# Patient Record
Sex: FEMALE | Race: WHITE | Employment: PART TIME | URBAN - METROPOLITAN AREA
[De-identification: names, ages, dates, MRNs, and addresses within clinical notes are randomized per-mention and may not be internally consistent; named-entity substitution may affect disease eponyms.]

---

## 2017-01-15 ENCOUNTER — HOSPITAL ENCOUNTER (EMERGENCY)
Age: 27
Discharge: HOME | End: 2017-01-15
Attending: EMERGENCY MEDICINE | Admitting: EMERGENCY MEDICINE
Payer: SELF-PAY

## 2017-01-15 VITALS
OXYGEN SATURATION: 99 % | SYSTOLIC BLOOD PRESSURE: 112 MMHG | BODY MASS INDEX: 21.19 KG/M2 | RESPIRATION RATE: 20 BRPM | HEART RATE: 81 BPM | TEMPERATURE: 97.6 F | DIASTOLIC BLOOD PRESSURE: 71 MMHG | HEIGHT: 67 IN | WEIGHT: 135 LBS

## 2017-01-15 DIAGNOSIS — K52.9 GASTROENTERITIS, ACUTE: Primary | ICD-10-CM

## 2017-01-15 LAB
ALBUMIN SERPL BCP-MCNC: 4 G/DL (ref 3.5–5)
ALBUMIN/GLOB SERPL: 1.3 {RATIO} (ref 1.1–2.2)
ALP SERPL-CCNC: 81 U/L (ref 45–117)
ALT SERPL-CCNC: 28 U/L (ref 12–78)
ANION GAP BLD CALC-SCNC: 12 MMOL/L (ref 5–15)
AST SERPL W P-5'-P-CCNC: 23 U/L (ref 15–37)
BASOPHILS # BLD AUTO: 0 K/UL (ref 0–0.1)
BASOPHILS # BLD: 0 % (ref 0–1)
BILIRUB SERPL-MCNC: 0.4 MG/DL (ref 0.2–1)
BUN SERPL-MCNC: 7 MG/DL (ref 6–20)
BUN/CREAT SERPL: 8 (ref 12–20)
CALCIUM SERPL-MCNC: 8.7 MG/DL (ref 8.5–10.1)
CHLORIDE SERPL-SCNC: 105 MMOL/L (ref 97–108)
CO2 SERPL-SCNC: 22 MMOL/L (ref 21–32)
CREAT SERPL-MCNC: 0.84 MG/DL (ref 0.55–1.02)
EOSINOPHIL # BLD: 0.1 K/UL (ref 0–0.4)
EOSINOPHIL NFR BLD: 1 % (ref 0–7)
ERYTHROCYTE [DISTWIDTH] IN BLOOD BY AUTOMATED COUNT: 13.6 % (ref 11.5–14.5)
GLOBULIN SER CALC-MCNC: 3 G/DL (ref 2–4)
GLUCOSE SERPL-MCNC: 142 MG/DL (ref 65–100)
HCT VFR BLD AUTO: 38.9 % (ref 35–47)
HGB BLD-MCNC: 13.4 G/DL (ref 11.5–16)
LYMPHOCYTES # BLD AUTO: 15 % (ref 12–49)
LYMPHOCYTES # BLD: 2.7 K/UL (ref 0.8–3.5)
MCH RBC QN AUTO: 29.7 PG (ref 26–34)
MCHC RBC AUTO-ENTMCNC: 34.4 G/DL (ref 30–36.5)
MCV RBC AUTO: 86.3 FL (ref 80–99)
MONOCYTES # BLD: 0.6 K/UL (ref 0–1)
MONOCYTES NFR BLD AUTO: 3 % (ref 5–13)
NEUTS SEG # BLD: 14.9 K/UL (ref 1.8–8)
NEUTS SEG NFR BLD AUTO: 81 % (ref 32–75)
PLATELET # BLD AUTO: 242 K/UL (ref 150–400)
POTASSIUM SERPL-SCNC: 3.9 MMOL/L (ref 3.5–5.1)
PROT SERPL-MCNC: 7 G/DL (ref 6.4–8.2)
RBC # BLD AUTO: 4.51 M/UL (ref 3.8–5.2)
SODIUM SERPL-SCNC: 139 MMOL/L (ref 136–145)
WBC # BLD AUTO: 18.4 K/UL (ref 3.6–11)

## 2017-01-15 PROCEDURE — 96374 THER/PROPH/DIAG INJ IV PUSH: CPT

## 2017-01-15 PROCEDURE — 96361 HYDRATE IV INFUSION ADD-ON: CPT

## 2017-01-15 PROCEDURE — 36415 COLL VENOUS BLD VENIPUNCTURE: CPT | Performed by: EMERGENCY MEDICINE

## 2017-01-15 PROCEDURE — 85025 COMPLETE CBC W/AUTO DIFF WBC: CPT | Performed by: EMERGENCY MEDICINE

## 2017-01-15 PROCEDURE — 74011250636 HC RX REV CODE- 250/636: Performed by: EMERGENCY MEDICINE

## 2017-01-15 PROCEDURE — 80053 COMPREHEN METABOLIC PANEL: CPT | Performed by: EMERGENCY MEDICINE

## 2017-01-15 PROCEDURE — 96372 THER/PROPH/DIAG INJ SC/IM: CPT

## 2017-01-15 PROCEDURE — 99284 EMERGENCY DEPT VISIT MOD MDM: CPT

## 2017-01-15 PROCEDURE — 96375 TX/PRO/DX INJ NEW DRUG ADDON: CPT

## 2017-01-15 RX ORDER — PROCHLORPERAZINE EDISYLATE 5 MG/ML
10 INJECTION INTRAMUSCULAR; INTRAVENOUS
Status: COMPLETED | OUTPATIENT
Start: 2017-01-15 | End: 2017-01-15

## 2017-01-15 RX ORDER — ONDANSETRON 2 MG/ML
4 INJECTION INTRAMUSCULAR; INTRAVENOUS
Status: COMPLETED | OUTPATIENT
Start: 2017-01-15 | End: 2017-01-15

## 2017-01-15 RX ORDER — DICYCLOMINE HYDROCHLORIDE 20 MG/1
20 TABLET ORAL EVERY 6 HOURS
Qty: 20 TAB | Refills: 0 | Status: SHIPPED | OUTPATIENT
Start: 2017-01-15 | End: 2017-01-15

## 2017-01-15 RX ORDER — PROMETHAZINE HYDROCHLORIDE 25 MG/1
25 SUPPOSITORY RECTAL
Qty: 12 SUPPOSITORY | Refills: 0 | Status: SHIPPED | OUTPATIENT
Start: 2017-01-15 | End: 2017-01-22

## 2017-01-15 RX ORDER — DICYCLOMINE HYDROCHLORIDE 20 MG/1
20 TABLET ORAL EVERY 6 HOURS
Qty: 20 TAB | Refills: 0 | Status: SHIPPED | OUTPATIENT
Start: 2017-01-15 | End: 2017-01-20

## 2017-01-15 RX ORDER — ONDANSETRON 4 MG/1
4 TABLET, ORALLY DISINTEGRATING ORAL
Qty: 10 TAB | Refills: 0 | Status: SHIPPED | OUTPATIENT
Start: 2017-01-15 | End: 2017-05-17

## 2017-01-15 RX ORDER — DICYCLOMINE HYDROCHLORIDE 10 MG/ML
20 INJECTION INTRAMUSCULAR
Status: COMPLETED | OUTPATIENT
Start: 2017-01-15 | End: 2017-01-15

## 2017-01-15 RX ORDER — ONDANSETRON 4 MG/1
4 TABLET, ORALLY DISINTEGRATING ORAL
Qty: 10 TAB | Refills: 0 | Status: SHIPPED | OUTPATIENT
Start: 2017-01-15 | End: 2017-01-15

## 2017-01-15 RX ADMIN — DICYCLOMINE HYDROCHLORIDE 20 MG: 20 INJECTION, SOLUTION INTRAMUSCULAR at 06:45

## 2017-01-15 RX ADMIN — ONDANSETRON 4 MG: 2 INJECTION INTRAMUSCULAR; INTRAVENOUS at 05:50

## 2017-01-15 RX ADMIN — PROCHLORPERAZINE EDISYLATE 10 MG: 5 INJECTION INTRAMUSCULAR; INTRAVENOUS at 07:55

## 2017-01-15 RX ADMIN — SODIUM CHLORIDE 1000 ML: 900 INJECTION, SOLUTION INTRAVENOUS at 05:52

## 2017-01-15 NOTE — DISCHARGE INSTRUCTIONS
Food Poisoning: Care Instructions  Your Care Instructions  Food poisoning occurs when you eat foods that contain harmful germs. Food can be contaminated while it is growing, during processing, or when it is prepared. Fresh fruits and vegetables also can be contaminated if they are washed in contaminated water. You may have become ill after eating undercooked meat or eggs or other unsafe foods. Cooking foods thoroughly and storing them properly can help prevent food poisoning. There are many types of food poisoning. Your symptoms depend on the type of food poisoning you have. You will probably begin to feel better in 1 or 2 days. In the meantime, get plenty of rest and make sure that you do not become dehydrated. Follow-up care is a key part of your treatment and safety. Be sure to make and go to all appointments, and call your doctor if you are having problems. Its also a good idea to know your test results and keep a list of the medicines you take. How can you care for yourself at home? · To prevent dehydration, drink plenty of fluids, enough so that your urine is light yellow or clear like water. Choose water and other caffeine-free clear liquids until you feel better. If you have kidney, heart, or liver disease and have to limit fluids, talk with your doctor before you increase the amount of fluids you drink. · Begin eating small amounts of mild, low-fat foods, depending on how you feel. Try foods like rice, dry crackers, bananas, and applesauce. ¨ Avoid spicy foods, alcohol, and coffee until 48 hours after all symptoms have disappeared. ¨ Avoid chewing gum that contains sorbitol. ¨ Avoid dairy products for 3 days after symptoms disappear. · Take your medicines as prescribed. Call your doctor if you think you are having a problem with your medicine. You will get more details on the specific medicines your doctor prescribes. To prevent food poisoning  · Keep hot foods hot and cold foods cold.   · Do not eat meats, dressings, salads, or other foods that have been kept at room temperature for more than 2 hours. · Use a thermometer to check your refrigerator. It should be between 34°F and 40°F.  · Defrost meats in the refrigerator or microwave, not on the kitchen counter. · Keep your hands and your kitchen clean. Wash your hands, cutting boards, and countertops with hot, soapy water. If you use the same cutting board for chopping vegetables and preparing raw meat, be sure to wash the cutting board with hot, soapy water between each use. · Cook meat until it is well done. · Do not eat raw eggs or uncooked dough or sauces made with raw eggs. · Do not take chances. If you think food looks or tastes spoiled, throw it out. When should you call for help? Call 911 anytime you think you may need emergency care. For example, call if:  · You have sudden, severe belly pain. · You passed out (lost consciousness). · You vomit blood or what looks like coffee grounds. · You pass maroon or very bloody stools. Call your doctor now or seek immediate medical care if:  · You have signs of needing more fluids. You have sunken eyes and a dry mouth, and you pass only a little dark urine. · Weakness in your legs makes it hard to stand or walk. · You have swelling in your hands, face, or feet. · You have trouble breathing. · You are dizzy or lightheaded, or you feel like you may faint. · Your stools are black and tarlike or have streaks of blood. · You have numbness and tingling in your hands or feet. · You have new nausea or vomiting. · You have new or increased belly pain. · Your joints ache. Watch closely for changes in your health, and be sure to contact your doctor if:  · Your vomiting is not getting better after 1 to 2 days. · Your diarrhea is not getting better after 3 days. Where can you learn more? Go to http://lakshmi-alonso.info/.   Enter C557 in the search box to learn more about \"Food Poisoning: Care Instructions. \"  Current as of: May 24, 2016  Content Version: 11.1  © 9451-5634 [employer]. Care instructions adapted under license by [employer] (which disclaims liability or warranty for this information). If you have questions about a medical condition or this instruction, always ask your healthcare professional. [address] any warranty or liability for your use of this information. Gastroenteritis: Care Instructions  Your Care Instructions  Gastroenteritis is an illness that may cause nausea, vomiting, and diarrhea. It is sometimes called \"stomach flu. \" It can be caused by bacteria or a virus. You will probably begin to feel better in 1 to 2 days. In the meantime, get plenty of rest and make sure you do not become dehydrated. Dehydration occurs when your body loses too much fluid. Follow-up care is a key part of your treatment and safety. Be sure to make and go to all appointments, and call your doctor if you are having problems. Its also a good idea to know your test results and keep a list of the medicines you take. How can you care for yourself at home? · If your doctor prescribed antibiotics, take them as directed. Do not stop taking them just because you feel better. You need to take the full course of antibiotics. · Drink plenty of fluids to prevent dehydration, enough so that your urine is light yellow or clear like water. Choose water and other caffeine-free clear liquids until you feel better. If you have kidney, heart, or liver disease and have to limit fluids, talk with your doctor before you increase your fluid intake. · Drink fluids slowly, in frequent, small amounts, because drinking too much too fast can cause vomiting. · Begin eating mild foods, such as dry toast, yogurt, applesauce, bananas, and rice. Avoid spicy, hot, or high-fat foods, and do not drink alcohol or caffeine for a day or two.  Do not drink milk or eat ice cream until you are feeling better. How to prevent gastroenteritis  · Keep hot foods hot and cold foods cold. · Do not eat meats, dressings, salads, or other foods that have been kept at room temperature for more than 2 hours. · Use a thermometer to check your refrigerator. It should be between 34°F and 40°F.  · Defrost meats in the refrigerator or microwave, not on the kitchen counter. · Keep your hands and your kitchen clean. Wash your hands, cutting boards, and countertops with hot soapy water frequently. · Cook meat until it is well done. · Do not eat raw eggs or uncooked sauces made with raw eggs. · Do not take chances. If food looks or tastes spoiled, throw it out. When should you call for help? Call 911 anytime you think you may need emergency care. For example, call if:  · You vomit blood or what looks like coffee grounds. · You passed out (lost consciousness). · You pass maroon or very bloody stools. Call your doctor now or seek immediate medical care if:  · You have severe belly pain. · You have signs of needing more fluids. You have sunken eyes, a dry mouth, and pass only a little dark urine. · You feel like you are going to faint. · You have increased belly pain that does not go away in 1 to 2 days. · You have new or increased nausea, or you are vomiting. · You have a new or higher fever. · Your stools are black and tarlike or have streaks of blood. Watch closely for changes in your health, and be sure to contact your doctor if:  · You are dizzy or lightheaded. · You urinate less than usual, or your urine is dark yellow or brown. · You do not feel better with each day that goes by. Where can you learn more? Go to http://lakshmi-alonso.info/. Enter N142 in the search box to learn more about \"Gastroenteritis: Care Instructions. \"  Current as of: May 24, 2016  Content Version: 11.1  © 3628-1505 [employer].  Care instructions adapted under license by [employer] (which disclaims liability or warranty for this information). If you have questions about a medical condition or this instruction, always ask your healthcare professional. [address] any warranty or liability for your use of this information.

## 2017-01-15 NOTE — ED NOTES
Assumed care of pt at this time from EMS. EMS states that pt had chickfila last night for dinner around 5 pm and at 3 am this morning starting vomiting. Pt states she is 7 weeks pregnant and had issues with vomiting with her other pregnancies but not this bad. Pt complains of 7 out of 10 abdominal pain. assessment done at this time. Call bell in reach.

## 2017-01-15 NOTE — ED NOTES
Pt no longer vomiting will try sips ice and ginger ale. Pt awaiting her ride to home. Someone just called her.

## 2017-01-15 NOTE — ED PROVIDER NOTES
HPI Comments: Nolan Fox is a 32 y.o. female with no significant PMHx who presents via EMS to the ED with cc of nausea and vomiting for the past 3 hours. Per patient, she ate [employer] last night and went to bed. Around 3am, she woke up with nausea and vomiting. She states that she has thrown up approximately 10-15 times. She also complains of epigastric abdominal pain. The pain is worse when she vomits and better when she is still. Pt states that she is approximately 7 weeks pregnant and never had these symptoms with her other pregnancies. She is a . Pt denies fevers, chills, chest pain, diarrhea, or any other symptoms. PCP: None  OB: Dr. Alexey Pennington History: 1/2ppd Tobacco, Occassionally EtOH, Marijuana occassionally     The history is provided by the patient. No  was used. Past Medical History:   Diagnosis Date    Abnormal Pap smear     Routine gynecological examination      [facility]       Past Surgical History:   Procedure Laterality Date    Hx tonsillectomy           Family History:   Problem Relation Age of Onset    Hypertension Father        Social History     Social History    Marital status:      Spouse name: N/A    Number of children: N/A    Years of education: N/A     Occupational History    Not on file. Social History Main Topics    Smoking status: Current Some Day Smoker     Packs/day: 1.00     Years: 6.00     Types: Cigarettes    Smokeless tobacco: Never Used    Alcohol use 6.0 oz/week     12 Cans of beer per week      Comment: varies    Drug use: Yes     Special: Marijuana    Sexual activity: Yes     Partners: Male     Birth control/ protection: Implant     Other Topics Concern    Not on file     Social History Narrative    ** Merged History Encounter **            ALLERGIES: Review of patient's allergies indicates no known allergies. Review of Systems   Constitutional: Negative for chills and fever.    HENT: Negative for congestion, rhinorrhea, sneezing and sore throat. Eyes: Negative for redness and visual disturbance. Respiratory: Negative for shortness of breath. Cardiovascular: Negative for leg swelling. Gastrointestinal: Positive for abdominal pain, nausea and vomiting. Genitourinary: Negative for difficulty urinating and frequency. Musculoskeletal: Negative for back pain, myalgias and neck stiffness. Skin: Negative for rash. Neurological: Negative for dizziness, syncope, weakness and headaches. Hematological: Negative for adenopathy. Vitals:    01/15/17 0543   BP: 135/87   Pulse: 81   Resp: 20   Temp: 97.6 °F (36.4 °C)   SpO2: 100%   Weight: 61.2 kg (135 lb)   Height: 5' 7\" (1.702 m)            Physical Exam   Constitutional: She is oriented to person, place, and time. She appears well-developed and well-nourished. HENT:   Head: Normocephalic and atraumatic. Mouth/Throat: Oropharynx is clear and moist.   Eyes: Conjunctivae and EOM are normal.   Neck: Normal range of motion and full passive range of motion without pain. Neck supple. Cardiovascular: Normal rate, regular rhythm, S1 normal, S2 normal, normal heart sounds, intact distal pulses and normal pulses. No murmur heard. Pulmonary/Chest: Effort normal and breath sounds normal. No respiratory distress. She has no wheezes. Abdominal: Soft. Normal appearance and bowel sounds are normal. She exhibits no distension. There is tenderness in the epigastric area. There is no rebound and no guarding. Musculoskeletal: Normal range of motion. Neurological: She is alert and oriented to person, place, and time. She has normal strength. Skin: Skin is warm and intact. No rash noted. She is diaphoretic (mildly). There is pallor. Psychiatric: She has a normal mood and affect. Her speech is normal and behavior is normal. Judgment and thought content normal.   Nursing note and vitals reviewed.        MDM  Number of Diagnoses or Management Options  Diagnosis management comments: DDx: Likely viral gastroenteritis vs food poisoning. Pt states that the pain only started after she had thrown up a few times. Pt states that she isn't going to keep this pregnancy and that she is going to set up an appointment with her OB to discuss . Will treat N/V and hydrate with IVF. Will continue to monitor and reassess. ED Course       Procedures    Progress Note  6:28 AM  I have re-evaluated pt and she states that her nausea is better, but she is still having epigastric pain. Will treat with IM Bentyl and reassess. Low concern for pancreatitis. Progress Note  6:49 AM  I have re-evaluated pt and reviewed her labs. Has a leukocytosis which is likely from her vomiting. Doubt this is bacterial in nature. Suspect this is food poisoning and will manage symptoms. This will likely run it's course in the next 24-48 hours. PROGRESS NOTE  7:15 AM   Re-evaluated patient. She states she feels better. Will discharge. Written by Abhay Bates ED Scribe, as dictated by Xi Heredia MD.    PROGRESS NOTE  7:27 AM   Patient is vomiting again. Will treat with IV Compazine. Patient will be discharged. Written by Abhay Bates ED Scribe, as dictated by Xi Heredia MD.    LABORATORY TESTS:  Recent Results (from the past 12 hour(s))   CBC WITH AUTOMATED DIFF    Collection Time: 01/15/17  5:51 AM   Result Value Ref Range    WBC 18.4 (H) 3.6 - 11.0 K/uL    RBC 4.51 3.80 - 5.20 M/uL    HGB 13.4 11.5 - 16.0 g/dL    HCT 38.9 35.0 - 47.0 %    MCV 86.3 80.0 - 99.0 FL    MCH 29.7 26.0 - 34.0 PG    MCHC 34.4 30.0 - 36.5 g/dL    RDW 13.6 11.5 - 14.5 %    PLATELET 552 199 - 051 K/uL    NEUTROPHILS 81 (H) 32 - 75 %    LYMPHOCYTES 15 12 - 49 %    MONOCYTES 3 (L) 5 - 13 %    EOSINOPHILS 1 0 - 7 %    BASOPHILS 0 0 - 1 %    ABS. NEUTROPHILS 14.9 (H) 1.8 - 8.0 K/UL    ABS. LYMPHOCYTES 2.7 0.8 - 3.5 K/UL    ABS. MONOCYTES 0.6 0.0 - 1.0 K/UL    ABS.  EOSINOPHILS 0.1 0.0 - 0.4 K/UL    ABS. BASOPHILS 0.0 0.0 - 0.1 K/UL   METABOLIC PANEL, COMPREHENSIVE    Collection Time: 01/15/17  5:51 AM   Result Value Ref Range    Sodium 139 136 - 145 mmol/L    Potassium 3.9 3.5 - 5.1 mmol/L    Chloride 105 97 - 108 mmol/L    CO2 22 21 - 32 mmol/L    Anion gap 12 5 - 15 mmol/L    Glucose 142 (H) 65 - 100 mg/dL    BUN 7 6 - 20 MG/DL    Creatinine 0.84 0.55 - 1.02 MG/DL    BUN/Creatinine ratio 8 (L) 12 - 20      GFR est AA >60 >60 ml/min/1.73m2    GFR est non-AA >60 >60 ml/min/1.73m2    Calcium 8.7 8.5 - 10.1 MG/DL    Bilirubin, total 0.4 0.2 - 1.0 MG/DL    ALT 28 12 - 78 U/L    AST 23 15 - 37 U/L    Alk. phosphatase 81 45 - 117 U/L    Protein, total 7.0 6.4 - 8.2 g/dL    Albumin 4.0 3.5 - 5.0 g/dL    Globulin 3.0 2.0 - 4.0 g/dL    A-G Ratio 1.3 1.1 - 2.2         IMAGING RESULTS:  No orders to display       MEDICATIONS GIVEN:  Medications   prochlorperazine (COMPAZINE) injection 10 mg (not administered)   ondansetron (ZOFRAN) injection 4 mg (4 mg IntraVENous Given 1/15/17 2637)   sodium chloride 0.9 % bolus infusion 1,000 mL (1,000 mL IntraVENous New Bag 1/15/17 7655)   dicyclomine (BENTYL) 10 mg/mL injection 20 mg (20 mg IntraMUSCular Given 1/15/17 0645)       IMPRESSION:  1. Gastroenteritis, acute        PLAN:  1. Current Discharge Medication List      START taking these medications    Details   ondansetron (ZOFRAN ODT) 4 mg disintegrating tablet Take 1 Tab by mouth every eight (8) hours as needed for Nausea. Qty: 10 Tab, Refills: 0      dicyclomine (BENTYL) 20 mg tablet Take 1 Tab by mouth every six (6) hours for 20 doses. Qty: 20 Tab, Refills: 0         CONTINUE these medications which have NOT CHANGED    Details   albuterol (PROVENTIL HFA, VENTOLIN HFA) 90 mcg/actuation inhaler Take 2 Puffs by inhalation every four (4) hours as needed for Wheezing. Qty: 1 Inhaler, Refills: 2    Associated Diagnoses: Bronchitis, acute, with bronchospasm           2.    Follow-up Information     Follow up With Details Comments [address], DO Call in 1 day  [address] [facility]  [address] [place] [address]  [phone]      [facility] EMERGENCY DEPT  As needed, If symptoms worsen [address]  [address] [facility]  [phone]        Return to ED if worse     Discharge Note:  7:28 AM  The patient has been re-evaluated and is ready for discharge. Reviewed available results with patient. Counseled patient on diagnosis and care plan. Patient has expressed understanding, and all questions have been answered. Patient agrees with plan and agrees to follow up as recommended, or to return to the ED if their symptoms worsen. Discharge instructions have been provided and explained to the patient, along with reasons to return to the ED. Written by Sudha Huff, ED Scribe, as dictated by Gerson Castillo MD.    This note is prepared by Sudha Huff, acting as Scribe for Gerson Castillo MD,.     Gerson Castillo MD,: The scribe's documentation has been prepared under my direction and personally reviewed by me in its entirety. I confirm that the note above accurately reflects all work, treatment, procedures, and medical decision making performed by me.

## 2017-01-15 NOTE — ED NOTES
Bedside shift change report given to Junior Hernandez RN (oncoming nurse) by Brendon Lepe RN (offgoing nurse). Report included the following information SBAR, Kardex, ED Summary, Procedure Summary, MAR and Recent Results.

## 2017-03-10 ENCOUNTER — HOSPITAL ENCOUNTER (EMERGENCY)
Age: 27
Discharge: HOME | End: 2017-03-10
Attending: EMERGENCY MEDICINE | Admitting: EMERGENCY MEDICINE
Payer: SELF-PAY

## 2017-03-10 VITALS
WEIGHT: 126.1 LBS | TEMPERATURE: 98.3 F | HEIGHT: 68 IN | OXYGEN SATURATION: 100 % | SYSTOLIC BLOOD PRESSURE: 139 MMHG | RESPIRATION RATE: 18 BRPM | HEART RATE: 60 BPM | DIASTOLIC BLOOD PRESSURE: 91 MMHG | BODY MASS INDEX: 19.11 KG/M2

## 2017-03-10 DIAGNOSIS — N93.9 MENSTRUAL BLEEDING PROBLEM: ICD-10-CM

## 2017-03-10 DIAGNOSIS — N93.9 ABNORMAL VAGINAL BLEEDING: Primary | ICD-10-CM

## 2017-03-10 LAB
APPEARANCE UR: CLEAR
BACTERIA URNS QL MICRO: NEGATIVE /HPF
BASOPHILS # BLD AUTO: 0.1 K/UL (ref 0–0.1)
BASOPHILS # BLD: 1 % (ref 0–1)
BILIRUB UR QL: NEGATIVE
CLUE CELLS VAG QL WET PREP: NORMAL
COLOR UR: ABNORMAL
EOSINOPHIL # BLD: 0.2 K/UL (ref 0–0.4)
EOSINOPHIL NFR BLD: 2 % (ref 0–7)
EPITH CASTS URNS QL MICRO: ABNORMAL /LPF
ERYTHROCYTE [DISTWIDTH] IN BLOOD BY AUTOMATED COUNT: 13.8 % (ref 11.5–14.5)
GLUCOSE UR STRIP.AUTO-MCNC: NEGATIVE MG/DL
HCG UR QL: NEGATIVE
HCT VFR BLD AUTO: 35.3 % (ref 35–47)
HGB BLD-MCNC: 12.3 G/DL (ref 11.5–16)
HGB UR QL STRIP: ABNORMAL
HYALINE CASTS URNS QL MICRO: ABNORMAL /LPF (ref 0–5)
KETONES UR QL STRIP.AUTO: NEGATIVE MG/DL
KOH PREP SPEC: NORMAL
LEUKOCYTE ESTERASE UR QL STRIP.AUTO: NEGATIVE
LYMPHOCYTES # BLD AUTO: 38 % (ref 12–49)
LYMPHOCYTES # BLD: 3.3 K/UL (ref 0.8–3.5)
MCH RBC QN AUTO: 30.1 PG (ref 26–34)
MCHC RBC AUTO-ENTMCNC: 34.8 G/DL (ref 30–36.5)
MCV RBC AUTO: 86.5 FL (ref 80–99)
MONOCYTES # BLD: 0.6 K/UL (ref 0–1)
MONOCYTES NFR BLD AUTO: 6 % (ref 5–13)
NEUTS SEG # BLD: 4.7 K/UL (ref 1.8–8)
NEUTS SEG NFR BLD AUTO: 53 % (ref 32–75)
NITRITE UR QL STRIP.AUTO: NEGATIVE
PH UR STRIP: 6.5 [PH] (ref 5–8)
PLATELET # BLD AUTO: 180 K/UL (ref 150–400)
PROT UR STRIP-MCNC: NEGATIVE MG/DL
RBC # BLD AUTO: 4.08 M/UL (ref 3.8–5.2)
RBC #/AREA URNS HPF: ABNORMAL /HPF (ref 0–5)
SERVICE CMNT-IMP: NORMAL
SP GR UR REFRACTOMETRY: 1.01 (ref 1–1.03)
T VAGINALIS VAG QL WET PREP: NORMAL
UA: UC IF INDICATED,UAUC: ABNORMAL
UROBILINOGEN UR QL STRIP.AUTO: 0.2 EU/DL (ref 0.2–1)
WBC # BLD AUTO: 8.8 K/UL (ref 3.6–11)
WBC URNS QL MICRO: ABNORMAL /HPF (ref 0–4)

## 2017-03-10 PROCEDURE — 99284 EMERGENCY DEPT VISIT MOD MDM: CPT

## 2017-03-10 PROCEDURE — 36415 COLL VENOUS BLD VENIPUNCTURE: CPT | Performed by: PHYSICIAN ASSISTANT

## 2017-03-10 PROCEDURE — 85025 COMPLETE CBC W/AUTO DIFF WBC: CPT | Performed by: PHYSICIAN ASSISTANT

## 2017-03-10 PROCEDURE — 87491 CHLMYD TRACH DNA AMP PROBE: CPT | Performed by: PHYSICIAN ASSISTANT

## 2017-03-10 PROCEDURE — 87210 SMEAR WET MOUNT SALINE/INK: CPT | Performed by: PHYSICIAN ASSISTANT

## 2017-03-10 PROCEDURE — 81001 URINALYSIS AUTO W/SCOPE: CPT | Performed by: PHYSICIAN ASSISTANT

## 2017-03-10 PROCEDURE — 81025 URINE PREGNANCY TEST: CPT | Performed by: PHYSICIAN ASSISTANT

## 2017-03-10 NOTE — ED NOTES
Discharge instructions given to and reviewed with patient by PA [facility]. Patient verbalized her understanding.

## 2017-03-10 NOTE — DISCHARGE INSTRUCTIONS
Abnormal Uterine Bleeding: Care Instructions  Your Care Instructions  Abnormal uterine bleeding (AUB) is irregular bleeding from the uterus that is longer or heavier than usual or does not occur at your regular time. Sometimes it is caused by changes in hormone levels. It can also be caused by growths in the uterus, such as fibroids or polyps. Sometimes a cause cannot be found. You may have heavy bleeding when you are not expecting your period. Your doctor may suggest a pregnancy test, if you think you are pregnant. Follow-up care is a key part of your treatment and safety. Be sure to make and go to all appointments, and call your doctor if you are having problems. It's also a good idea to know your test results and keep a list of the medicines you take. How can you care for yourself at home? · Be safe with medicines. Take pain medicines exactly as directed. ¨ If the doctor gave you a prescription medicine for pain, take it as prescribed. ¨ If you are not taking a prescription pain medicine, ask your doctor if you can take an over-the-counter medicine. · You may be low in iron because of blood loss. Eat a balanced diet that is high in iron and vitamin C. Foods rich in iron include red meat, shellfish, eggs, beans, and leafy green vegetables. Talk to your doctor about whether you need to take iron pills or a multivitamin. When should you call for help? Call 911 anytime you think you may need emergency care. For example, call if:  · You passed out (lost consciousness). Call your doctor now or seek immediate medical care if:  · You have sudden, severe pain in your belly or pelvis. · You have severe vaginal bleeding. You are soaking through your usual pads or tampons every hour for 2 or more hours. · You feel dizzy or lightheaded, or you feel like you may faint. Watch closely for changes in your health, and be sure to contact your doctor if:  · You have new belly or pelvic pain.   · You have a fever.  · Your bleeding gets worse or lasts longer than 1 week. · You think you may be pregnant. Where can you learn more? Go to http://lakshmi-alonso.info/. Enter M681 in the search box to learn more about \"Abnormal Uterine Bleeding: Care Instructions. \"  Current as of: February 25, 2016  Content Version: 11.1  © 2773-5703 [employer]. Care instructions adapted under license by [employer] (which disclaims liability or warranty for this information). If you have questions about a medical condition or this instruction, always ask your healthcare professional. [address] any warranty or liability for your use of this information. Painful Menstrual Cramps: Care Instructions  Your Care Instructions    Painful menstrual cramps are very common. Many women go to the doctor because of bad cramps when they get their period. You may have cramps in your back, thighs, and belly. You may also have diarrhea, constipation, or nausea. Some women also get dizzy. Pain medicine and home treatment can help you feel better. Follow-up care is a key part of your treatment and safety. Be sure to make and go to all appointments, and call your doctor if you are having problems. It's also a good idea to know your test results and keep a list of the medicines you take. How can you care for yourself at home? · Take anti-inflammatory medicines for pain. Ibuprofen (Advil, Motrin) and naproxen (Aleve) usually work better than aspirin. ¨ Be safe with medicines. Talk to your doctor or pharmacist before you take any of these medicines. They may not be safe if you take other medicines or have other health problems. ¨ Start taking the recommended dose of pain medicine as soon as you start to feel pain. Or you can start on the day before your period. Keep taking the medicine for as many days as you have cramps.   ¨ If anti-inflammatory medicines don't help, try acetaminophen (Tylenol). ¨ Do not take two or more pain medicines at the same time unless the doctor told you to. Many pain medicines have acetaminophen, which is Tylenol. Too much acetaminophen (Tylenol) can be harmful. ¨ Read and follow all instructions on the label. · Put a heating pad set on low or a hot water bottle on your belly. Or take a warm bath. Heat improves blood flow and may help with pain. · Lie down and put a pillow under your knees. Or lie on your side and bring your knees up to your chest. This will help with any back pressure. · Get at least 30 minutes of exercise on most days of the week. This improves blood flow and may decrease pain. Walking is a good choice. You also may want to do other activities, such as running, swimming, cycling, or playing tennis or team sports. When should you call for help? Call 911 anytime you think you may need emergency care. For example, call if:  · You passed out (lost consciousness). Call your doctor now or seek immediate medical care if:  · You have sudden, severe pain in your belly or pelvis. · You have severe vaginal bleeding. This means that you are soaking through your usual pads or tampons every hour for 2 or more hours and passing clots of blood. · You are dizzy or lightheaded, or you feel like you may faint. Watch closely for changes in your health, and be sure to contact your doctor if:  · You think you may be pregnant. · You have new belly or pelvic pain. · You are taking pain medicine, but it is not helping. · You feel weak and tired. Where can you learn more? Go to http://lakshmi-alonso.info/. Enter [id number] in the search box to learn more about \"Painful Menstrual Cramps: Care Instructions. \"  Current as of: February 25, 2016  Content Version: 11.1  © 8551-3354 [employer]. Care instructions adapted under license by [employer] (which disclaims liability or warranty for this information).  If you have questions about a medical condition or this instruction, always ask your healthcare professional. [place] [address] any warranty or liability for your use of this information.

## 2017-03-10 NOTE — ED PROVIDER NOTES
HPI Comments: Kay Gregory is a 32 y.o. female  A1 with no significant medical history who presents ambulatory to [facility] ED with a chief complaint of vaginal bleeding with visible clots for the past three days, with patient reporting needing to change her pad every hour. Patient reports she had  completed on 17, and states she has not had a menstrual cycle since that time. Patient reports use of tobacco products (1 PPD), occasional use of alcohol, and daily use of marijuana, but denies use of any other illicit drugs. Patient denies use of birth control. Patient denies any headaches, dizziness, lightheadedness, urinary symptoms, vaginal or pelvic pain, or any other symptoms at this time. PCP: Joseph Tse MD    There are no other complaints, changes, or physical findings at this time. The history is provided by the patient. Past Medical History:   Diagnosis Date    Abnormal Pap smear     Routine gynecological examination     [facility]       Past Surgical History:   Procedure Laterality Date    HX TONSILLECTOMY           Family History:   Problem Relation Age of Onset    Hypertension Father        Social History     Social History    Marital status:      Spouse name: N/A    Number of children: N/A    Years of education: N/A     Occupational History    Not on file. Social History Main Topics    Smoking status: Current Some Day Smoker     Packs/day: 1.00     Years: 6.00     Types: Cigarettes    Smokeless tobacco: Never Used    Alcohol use 6.0 oz/week     12 Cans of beer per week      Comment: varies    Drug use: Yes     Special: Marijuana    Sexual activity: Yes     Partners: Male     Birth control/ protection: Implant     Other Topics Concern    Not on file     Social History Narrative    ** Merged History Encounter **          ALLERGIES: Review of patient's allergies indicates no known allergies. Review of Systems   Constitutional: Negative. HENT: Negative. Eyes: Negative. Respiratory: Negative. Cardiovascular: Negative. Gastrointestinal: Negative. Endocrine: Negative. Genitourinary: Positive for vaginal bleeding. Negative for difficulty urinating, dysuria, frequency, pelvic pain and vaginal pain. Musculoskeletal: Negative. Skin: Negative. Allergic/Immunologic: Negative. Neurological: Negative. Negative for dizziness, light-headedness and headaches. Hematological: Negative. Psychiatric/Behavioral: Negative. All other systems reviewed and are negative. Patient Vitals for the past 12 hrs:   Temp Pulse Resp BP SpO2   03/10/17 1609 98.3 °F (36.8 °C) 60 18 (!) 139/91 100 %      Physical Exam   Constitutional: She is oriented to person, place, and time. She appears well-developed and well-nourished. No distress. 33 yo  female   HENT:   Head: Normocephalic and atraumatic. Eyes: Conjunctivae are normal. Right eye exhibits no discharge. Left eye exhibits no discharge. Neck: Normal range of motion. Neck supple. Cardiovascular: Normal rate, regular rhythm and normal heart sounds. No murmur heard. Pulmonary/Chest: Effort normal and breath sounds normal. No respiratory distress. She has no wheezes. Abdominal: Soft. Bowel sounds are normal. She exhibits no distension. There is no tenderness. There is no rebound, no guarding and no CVA tenderness. Genitourinary: Uterus normal. There is no rash, tenderness or lesion on the right labia. There is no rash, tenderness or lesion on the left labia. Cervix exhibits no motion tenderness. Right adnexum displays no mass, no tenderness and no fullness. Left adnexum displays no mass, no tenderness and no fullness. There is bleeding in the vagina. Genitourinary Comments: Blood in vaginal vault. No clots noted. Lymphadenopathy:        Right: No inguinal adenopathy present. Left: No inguinal adenopathy present.    Neurological: She is alert and oriented to person, place, and time. Skin: Skin is warm and dry. She is not diaphoretic. Psychiatric: She has a normal mood and affect. Her behavior is normal.   Nursing note and vitals reviewed. MDM  Number of Diagnoses or Management Options  Diagnosis management comments: DDx: Abnormal menstrual bleeding, menses, pregnancy and other related complications including ectopic or miscarriage       Amount and/or Complexity of Data Reviewed  Clinical lab tests: ordered and reviewed  Review and summarize past medical records: yes    Patient Progress  Patient progress: stable      Procedures     Procedure Note - Pelvic Exam:    4:32 PM  Performed by: Terrell Womack  Chaperoned by: Rambo Bashir RN  Pelvic exam was performed using bimanual and speculum. Further findings noted in physical exam.   The procedure took 1-15 minutes, and patient tolerated well.     LABORATORY TESTS:  Recent Results (from the past 12 hour(s))   SAMIRA, OTHER SOURCES    Collection Time: 03/10/17  4:47 PM   Result Value Ref Range    Special Requests: NO SPECIAL REQUESTS      KOH NO YEAST SEEN     WET PREP    Collection Time: 03/10/17  4:47 PM   Result Value Ref Range    Clue cells CLUE CELLS ABSENT      Wet prep NO TRICHOMONAS SEEN     URINALYSIS W/ REFLEX CULTURE    Collection Time: 03/10/17  4:47 PM   Result Value Ref Range    Color YELLOW/STRAW      Appearance CLEAR CLEAR      Specific gravity 1.008 1.003 - 1.030      pH (UA) 6.5 5.0 - 8.0      Protein NEGATIVE  NEG mg/dL    Glucose NEGATIVE  NEG mg/dL    Ketone NEGATIVE  NEG mg/dL    Bilirubin NEGATIVE  NEG      Blood TRACE (A) NEG      Urobilinogen 0.2 0.2 - 1.0 EU/dL    Nitrites NEGATIVE  NEG      Leukocyte Esterase NEGATIVE  NEG      WBC 0-4 0 - 4 /hpf    RBC 0-5 0 - 5 /hpf    Epithelial cells FEW FEW /lpf    Bacteria NEGATIVE  NEG /hpf    UA:UC IF INDICATED CULTURE NOT INDICATED BY UA RESULT CNI      Hyaline cast 0-2 0 - 5 /lpf   HCG URINE, QL    Collection Time: 03/10/17  4:47 PM   Result Value Ref Range    HCG urine, Ql. NEGATIVE  NEG     CBC WITH AUTOMATED DIFF    Collection Time: 03/10/17  4:47 PM   Result Value Ref Range    WBC 8.8 3.6 - 11.0 K/uL    RBC 4.08 3.80 - 5.20 M/uL    HGB 12.3 11.5 - 16.0 g/dL    HCT 35.3 35.0 - 47.0 %    MCV 86.5 80.0 - 99.0 FL    MCH 30.1 26.0 - 34.0 PG    MCHC 34.8 30.0 - 36.5 g/dL    RDW 13.8 11.5 - 14.5 %    PLATELET 877 817 - 084 K/uL    NEUTROPHILS 53 32 - 75 %    LYMPHOCYTES 38 12 - 49 %    MONOCYTES 6 5 - 13 %    EOSINOPHILS 2 0 - 7 %    BASOPHILS 1 0 - 1 %    ABS. NEUTROPHILS 4.7 1.8 - 8.0 K/UL    ABS. LYMPHOCYTES 3.3 0.8 - 3.5 K/UL    ABS. MONOCYTES 0.6 0.0 - 1.0 K/UL    ABS. EOSINOPHILS 0.2 0.0 - 0.4 K/UL    ABS. BASOPHILS 0.1 0.0 - 0.1 K/UL       IMPRESSION:  1. Abnormal vaginal bleeding    2. Menstrual bleeding problem        PLAN:  1. Current Discharge Medication List      CONTINUE these medications which have NOT CHANGED    Details   ondansetron (ZOFRAN ODT) 4 mg disintegrating tablet Take 1 Tab by mouth every eight (8) hours as needed for Nausea. Qty: 10 Tab, Refills: 0      albuterol (PROVENTIL HFA, VENTOLIN HFA) 90 mcg/actuation inhaler Take 2 Puffs by inhalation every four (4) hours as needed for Wheezing. Qty: 1 Inhaler, Refills: 2    Associated Diagnoses: Bronchitis, acute, with bronchospasm           2. Follow-up Information     Follow up With Details Comments Contact Osmin Orlando MD In 1 week As needed Terry Vargas [phone]  [address] [place] [address], DO In 1 week As needed [address]  [phone]          Return to ED if worse     Discharge Note:  6:14 PM  The patient is ready for discharge. The patient's signs, symptoms, diagnosis, and discharge instructions have been discussed and the patient has conveyed their understanding. The patient is to follow up as recommended or return to the ER should their symptoms worsen.  Plan has been discussed and the patient is in agreement. This note is prepared by Amira Alvarado, acting as Scribe for VIKI Mar: The scribe's documentation has been prepared under my direction and personally reviewed by me in its entirety. I confirm that the note above accurately reflects all work, treatment, procedures, and medical decision making performed by me.

## 2017-03-10 NOTE — ED NOTES
Assumed care of patient who presents to ED with c/o vaginal bleeding x 1 week with increased clots over last three days. She denies pain, nausea, vomiting but does report \"I'm feeling a little lightheaded. \" She has been using 10 pads/day for 3 days. She is s/o elective  on 26 and had no complication after. Patient resting in position of comfort with call bell in reach. Will continue to monitor.

## 2017-03-10 NOTE — ED NOTES
Stand-by assist for pelvic exam performed by josé luis Good.  Specimens obtained and sent to lab. Patient tolerated procedure well.

## 2017-03-13 LAB
C TRACH DNA SPEC QL NAA+PROBE: NEGATIVE
N GONORRHOEA DNA SPEC QL NAA+PROBE: NEGATIVE
SAMPLE TYPE: NORMAL
SERVICE CMNT-IMP: NORMAL
SPECIMEN SOURCE: NORMAL

## 2017-05-17 ENCOUNTER — HOSPITAL ENCOUNTER (EMERGENCY)
Age: 27
Discharge: HOME | End: 2017-05-17
Attending: FAMILY MEDICINE

## 2017-05-17 VITALS
BODY MASS INDEX: 19.7 KG/M2 | SYSTOLIC BLOOD PRESSURE: 143 MMHG | OXYGEN SATURATION: 97 % | RESPIRATION RATE: 18 BRPM | HEIGHT: 68 IN | WEIGHT: 130 LBS | TEMPERATURE: 97.1 F | HEART RATE: 72 BPM | DIASTOLIC BLOOD PRESSURE: 84 MMHG

## 2017-05-17 DIAGNOSIS — K04.7 TOOTH INFECTION: Primary | ICD-10-CM

## 2017-05-17 DIAGNOSIS — Z32.01 PREGNANCY TEST PERFORMED, PREGNANCY CONFIRMED: ICD-10-CM

## 2017-05-17 LAB — HCG UR QL: POSITIVE

## 2017-05-17 RX ORDER — AMOXICILLIN 500 MG/1
1000 CAPSULE ORAL 2 TIMES DAILY
Qty: 40 CAP | Refills: 0 | Status: SHIPPED | OUTPATIENT
Start: 2017-05-17 | End: 2017-10-09

## 2017-05-17 NOTE — DISCHARGE INSTRUCTIONS
Abscessed Tooth: Care Instructions  Your Care Instructions    An abscessed tooth is a tooth that has a pocket of pus in the tissues around it. Pus forms when the body tries to fight an infection caused by bacteria. If the pus cannot drain, it forms an abscess. An abscessed tooth can cause red, swollen gums and throbbing pain, especially when you chew. You may have a bad taste in your mouth and a fever, and your jaw may swell. Damage to the tooth, untreated tooth decay, or gum disease can cause an abscessed tooth. An abscessed tooth needs to be treated by a dental professional right away. If it is not treated, the infection could spread to other parts of your body. Your dentist will give you antibiotics to stop the infection. He or she may make a hole in the tooth or cut open (agustín) the abscess inside your mouth so that the infection can drain, which should relieve your pain. You may need to have a root canal treatment, which tries to save your tooth by taking out the infected pulp and replacing it with a healing medicine and/or a filling. If these treatments do not work, your tooth may have to be removed. Follow-up care is a key part of your treatment and safety. Be sure to make and go to all appointments, and call your doctor if you are having problems. It's also a good idea to know your test results and keep a list of the medicines you take. How can you care for yourself at home? · Reduce pain and swelling in your face and jaw by putting ice or a cold pack on the outside of your cheek for 10 to 20 minutes at a time. Put a thin cloth between the ice and your skin. · Take pain medicines exactly as directed. ¨ If the doctor gave you a prescription medicine for pain, take it as prescribed. ¨ If you are not taking a prescription pain medicine, ask your doctor if you can take an over-the-counter medicine. · Take your antibiotics as directed. Do not stop taking them just because you feel better.  You need to take the full course of antibiotics. To prevent tooth abscess  · Brush and floss every day, and have regular dental checkups. · Eat a healthy diet, and avoid sugary foods and drinks. · Do not smoke, use e-cigarettes with nicotine, or use spit tobacco. Tobacco and nicotine slow your ability to heal. Tobacco also increases your risk for gum disease and cancer of the mouth and throat. If you need help quitting, talk to your doctor about stop-smoking programs and medicines. These can increase your chances of quitting for good. When should you call for help? Call 911 anytime you think you may need emergency care. For example, call if:  · You have trouble breathing. Call your doctor now or seek immediate medical care if:  · You are dizzy or lightheaded, or you feel like you may faint. · You have a new or higher fever. · You have swelling, redness, or pain that spreads or gets worse. · You have pus coming from the tooth area. · You have an earache or pain behind your ear. · You have a fever with a stiff neck or a severe headache. · You are sensitive to light or feel very sleepy or confused. Watch closely for changes in your health, and be sure to contact your doctor if:  · You do not get better as expected. Where can you learn more? Go to http://lakshmi-alonso.info/. Enter D957 in the search box to learn more about \"Abscessed Tooth: Care Instructions. \"  Current as of: September 19, 2016  Content Version: 11.2  © 5631-1646 [employer]. Care instructions adapted under license by [employer] (which disclaims liability or warranty for this information). If you have questions about a medical condition or this instruction, always ask your healthcare professional. [place] [address] any warranty or liability for your use of this information.

## 2017-05-17 NOTE — UC PROVIDER NOTE
Patient is a 32 y.o. female presenting with dental problem. The history is provided by the patient. Dental Pain    This is a new problem. The current episode started more than 2 days ago. The problem occurs constantly. The problem has been rapidly worsening. The pain is located in the right lower mouth. The quality of the pain is throbbing. The pain is at a severity of 9/10. The pain is severe. Associated symptoms include swelling (on rt jaw). There was no drainage. She has tried nothing for the symptoms. Past Medical History:   Diagnosis Date    Abnormal Pap smear     Routine gynecological examination     [facility]        Past Surgical History:   Procedure Laterality Date    HX GYN      HX TONSILLECTOMY           Family History   Problem Relation Age of Onset    Hypertension Father         Social History     Social History    Marital status:      Spouse name: N/A    Number of children: N/A    Years of education: N/A     Occupational History    Not on file. Social History Main Topics    Smoking status: Current Every Day Smoker     Packs/day: 1.00     Years: 6.00     Types: Cigarettes    Smokeless tobacco: Never Used    Alcohol use 6.0 oz/week     12 Cans of beer per week      Comment: varies    Drug use: Yes     Special: Marijuana    Sexual activity: Yes     Partners: Male     Birth control/ protection: Implant     Other Topics Concern    Not on file     Social History Narrative    ** Merged History Encounter **                     ALLERGIES: Review of patient's allergies indicates no known allergies. Review of Systems   HENT: Positive for dental problem. Vitals:    05/17/17 1654   BP: 143/84   Pulse: 72   Resp: 18   Temp: 97.1 °F (36.2 °C)   SpO2: 97%   Weight: 59 kg (130 lb)   Height: 5' 8\" (1.727 m)       Physical Exam   HENT:   Mouth/Throat: Oropharynx is clear and moist and mucous membranes are normal. Abnormal dentition. Dental caries present.        Nursing note and vitals reviewed. MDM     Differential Diagnosis; Clinical Impression; Plan:     CLINICAL IMPRESSION:  Tooth infection  (primary encounter diagnosis)  Pregnant- confirmed today    DDX    Plan:    Start amoxicillin- tylenol and ibuprofen as needed.   Follow with dentist ASAP  Amount and/or Complexity of Data Reviewed:    Review and summarize past medical records:  Yes  Risk of Significant Complications, Morbidity, and/or Mortality:   Presenting problems:  Low  Management options:  Low  Progress:   Patient progress:  Stable      Procedures

## 2017-10-02 ENCOUNTER — HOSPITAL ENCOUNTER (EMERGENCY)
Age: 27
Discharge: HOME | DRG: 566 | End: 2017-10-03
Attending: EMERGENCY MEDICINE
Payer: MEDICAID

## 2017-10-02 VITALS
DIASTOLIC BLOOD PRESSURE: 70 MMHG | RESPIRATION RATE: 16 BRPM | HEIGHT: 68 IN | OXYGEN SATURATION: 97 % | HEART RATE: 78 BPM | WEIGHT: 147.2 LBS | TEMPERATURE: 98 F | SYSTOLIC BLOOD PRESSURE: 118 MMHG | BODY MASS INDEX: 22.31 KG/M2

## 2017-10-02 DIAGNOSIS — Z3A.25 25 WEEKS GESTATION OF PREGNANCY: ICD-10-CM

## 2017-10-02 DIAGNOSIS — F32.A DEPRESSION, UNSPECIFIED DEPRESSION TYPE: Primary | ICD-10-CM

## 2017-10-02 LAB
ALBUMIN SERPL-MCNC: 3.2 G/DL (ref 3.5–5)
ALBUMIN/GLOB SERPL: 0.8 {RATIO} (ref 1.1–2.2)
ALP SERPL-CCNC: 113 U/L (ref 45–117)
ALT SERPL-CCNC: 19 U/L (ref 12–78)
AMPHET UR QL SCN: NEGATIVE
ANION GAP SERPL CALC-SCNC: 12 MMOL/L (ref 5–15)
APAP SERPL-MCNC: 4 UG/ML (ref 10–30)
APPEARANCE UR: CLEAR
AST SERPL-CCNC: 15 U/L (ref 15–37)
BACTERIA URNS QL MICRO: NEGATIVE /HPF
BARBITURATES UR QL SCN: NEGATIVE
BASOPHILS # BLD: 0 K/UL (ref 0–0.1)
BASOPHILS NFR BLD: 0 % (ref 0–1)
BENZODIAZ UR QL: NEGATIVE
BILIRUB SERPL-MCNC: 0.3 MG/DL (ref 0.2–1)
BILIRUB UR QL: NEGATIVE
BUN SERPL-MCNC: 8 MG/DL (ref 6–20)
BUN/CREAT SERPL: 13 (ref 12–20)
CALCIUM SERPL-MCNC: 8 MG/DL (ref 8.5–10.1)
CANNABINOIDS UR QL SCN: POSITIVE
CHLORIDE SERPL-SCNC: 105 MMOL/L (ref 97–108)
CO2 SERPL-SCNC: 21 MMOL/L (ref 21–32)
COCAINE UR QL SCN: NEGATIVE
COLOR UR: ABNORMAL
CREAT SERPL-MCNC: 0.62 MG/DL (ref 0.55–1.02)
DRUG SCRN COMMENT,DRGCM: ABNORMAL
EOSINOPHIL # BLD: 0 K/UL (ref 0–0.4)
EOSINOPHIL NFR BLD: 0 % (ref 0–7)
EPITH CASTS URNS QL MICRO: ABNORMAL /LPF
ERYTHROCYTE [DISTWIDTH] IN BLOOD BY AUTOMATED COUNT: 13.9 % (ref 11.5–14.5)
GLOBULIN SER CALC-MCNC: 3.9 G/DL (ref 2–4)
GLUCOSE SERPL-MCNC: 99 MG/DL (ref 65–100)
GLUCOSE UR STRIP.AUTO-MCNC: NEGATIVE MG/DL
HCT VFR BLD AUTO: 35.3 % (ref 35–47)
HGB BLD-MCNC: 12.1 G/DL (ref 11.5–16)
HGB UR QL STRIP: NEGATIVE
HYALINE CASTS URNS QL MICRO: ABNORMAL /LPF (ref 0–5)
KETONES UR QL STRIP.AUTO: 80 MG/DL
LEUKOCYTE ESTERASE UR QL STRIP.AUTO: NEGATIVE
LYMPHOCYTES # BLD: 2 K/UL (ref 0.8–3.5)
LYMPHOCYTES NFR BLD: 10 % (ref 12–49)
MCH RBC QN AUTO: 29.2 PG (ref 26–34)
MCHC RBC AUTO-ENTMCNC: 34.3 G/DL (ref 30–36.5)
MCV RBC AUTO: 85.3 FL (ref 80–99)
METHADONE UR QL: NEGATIVE
MONOCYTES # BLD: 0.4 K/UL (ref 0–1)
MONOCYTES NFR BLD: 2 % (ref 5–13)
MUCOUS THREADS URNS QL MICRO: ABNORMAL /LPF
NEUTS SEG # BLD: 17.7 K/UL (ref 1.8–8)
NEUTS SEG NFR BLD: 88 % (ref 32–75)
NITRITE UR QL STRIP.AUTO: NEGATIVE
OPIATES UR QL: NEGATIVE
PCP UR QL: NEGATIVE
PH UR STRIP: 7 [PH] (ref 5–8)
PLATELET # BLD AUTO: 218 K/UL (ref 150–400)
POTASSIUM SERPL-SCNC: 4 MMOL/L (ref 3.5–5.1)
PROT SERPL-MCNC: 7.1 G/DL (ref 6.4–8.2)
PROT UR STRIP-MCNC: 30 MG/DL
RBC # BLD AUTO: 4.14 M/UL (ref 3.8–5.2)
RBC #/AREA URNS HPF: ABNORMAL /HPF (ref 0–5)
SALICYLATES SERPL-MCNC: 2.6 MG/DL (ref 2.8–20)
SODIUM SERPL-SCNC: 138 MMOL/L (ref 136–145)
SP GR UR REFRACTOMETRY: 1.03 (ref 1–1.03)
UROBILINOGEN UR QL STRIP.AUTO: 0.2 EU/DL (ref 0.2–1)
WBC # BLD AUTO: 20.1 K/UL (ref 3.6–11)
WBC URNS QL MICRO: ABNORMAL /HPF (ref 0–4)

## 2017-10-02 PROCEDURE — 90791 PSYCH DIAGNOSTIC EVALUATION: CPT

## 2017-10-02 PROCEDURE — 80307 DRUG TEST PRSMV CHEM ANLYZR: CPT | Performed by: STUDENT IN AN ORGANIZED HEALTH CARE EDUCATION/TRAINING PROGRAM

## 2017-10-02 PROCEDURE — 81001 URINALYSIS AUTO W/SCOPE: CPT | Performed by: STUDENT IN AN ORGANIZED HEALTH CARE EDUCATION/TRAINING PROGRAM

## 2017-10-02 PROCEDURE — 99284 EMERGENCY DEPT VISIT MOD MDM: CPT

## 2017-10-02 PROCEDURE — 96360 HYDRATION IV INFUSION INIT: CPT

## 2017-10-02 PROCEDURE — 36415 COLL VENOUS BLD VENIPUNCTURE: CPT | Performed by: STUDENT IN AN ORGANIZED HEALTH CARE EDUCATION/TRAINING PROGRAM

## 2017-10-02 PROCEDURE — 74011250636 HC RX REV CODE- 250/636: Performed by: EMERGENCY MEDICINE

## 2017-10-02 PROCEDURE — 85025 COMPLETE CBC W/AUTO DIFF WBC: CPT | Performed by: STUDENT IN AN ORGANIZED HEALTH CARE EDUCATION/TRAINING PROGRAM

## 2017-10-02 PROCEDURE — 80053 COMPREHEN METABOLIC PANEL: CPT | Performed by: STUDENT IN AN ORGANIZED HEALTH CARE EDUCATION/TRAINING PROGRAM

## 2017-10-02 RX ADMIN — SODIUM CHLORIDE 1000 ML: 900 INJECTION, SOLUTION INTRAVENOUS at 22:29

## 2017-10-03 NOTE — DISCHARGE INSTRUCTIONS
Depression and Chronic Disease: Care Instructions  Your Care Instructions  A chronic disease is one that you have for a long time. Some chronic diseases can be controlled, but they usually cannot be cured. Depression is common in people with chronic diseases, but it often goes unnoticed. Many people have concerns about seeking treatment for a mental health problem. You may think it's a sign of weakness, or you don't want people to know about it. It's important to overcome these reasons for not seeking treatment. Treating depression or anxiety is good for your health. Follow-up care is a key part of your treatment and safety. Be sure to make and go to all appointments, and call your doctor if you are having problems. It's also a good idea to know your test results and keep a list of the medicines you take. How can you care for yourself at home? Watch for symptoms of depression  The symptoms of depression are often subtle at first. You may think they are caused by your disease rather than depression. Or you may think it is normal to be depressed when you have a chronic disease. If you are depressed you may:  · Feel sad or hopeless. · Feel guilty or worthless. · Not enjoy the things you used to enjoy. · Feel hopeless, as though life is not worth living. · Have trouble thinking or remembering. · Have low energy, and you may not eat or sleep well. · Pull away from others. · Think often about death or killing yourself. (Keep the numbers for these national suicide hotlines: 9-244-329-TALK [1-353.978.4847] and 0-331-KJAPCPS [1-625.223.7506]. )  Get treatment  By treating your depression, you can feel more hopeful and have more energy. If you feel better, you may take better care of yourself, so your health may improve. · Talk to your doctor if you have any changes in mood during treatment for your disease. · Ask your doctor for help.  Counseling, antidepressant medicine, or a combination of the two can help most people with depression. Often a combination works best. Counseling can also help you cope with having a chronic disease. When should you call for help? Call 911 anytime you think you may need emergency care. For example, call if:  · You feel like hurting yourself or someone else. · Someone you know has depression and is about to attempt or is attempting suicide. Call your doctor now or seek immediate medical care if:  · You hear voices. · Someone you know has depression and:  ¨ Starts to give away his or her possessions. ¨ Uses illegal drugs or drinks alcohol heavily. ¨ Talks or writes about death, including writing suicide notes or talking about guns, knives, or pills. ¨ Starts to spend a lot of time alone. ¨ Acts very aggressively or suddenly appears calm. Watch closely for changes in your health, and be sure to contact your doctor if:  · You do not get better as expected. Where can you learn more? Go to http://lakshmiMagnomaticsalonso.info/. Enter G679 in the search box to learn more about \"Depression and Chronic Disease: Care Instructions. \"  Current as of: July 26, 2016  Content Version: 11.3  © 8089-6491 [employer]. Care instructions adapted under license by [employer] (which disclaims liability or warranty for this information). If you have questions about a medical condition or this instruction, always ask your healthcare professional. [place] [address] any warranty or liability for your use of this information. Weeks 22 to 26 of Your Pregnancy: Care Instructions  Your Care Instructions    As you enter your 7th month of pregnancy at week 26, your baby's lungs are growing stronger and getting ready to breathe. You may notice that your baby responds to the sound of your or your partner's voice. You may also notice that your baby does less turning and twisting and more squirming or jerking.  Jerking often means that your baby has the hiccups. Hiccups are perfectly normal and are only temporary. You may want to think about attending a childbirth preparation class. This is also a good time to start thinking about whether you want to have pain medicine during labor. Most pregnant women are tested for gestational diabetes between weeks 25 and 28. Gestational diabetes occurs when your blood sugar level gets too high when you're pregnant. The test is important, because you can have gestational diabetes and not know it. But the condition can cause problems for your baby. Follow-up care is a key part of your treatment and safety. Be sure to make and go to all appointments, and call your doctor if you are having problems. It's also a good idea to know your test results and keep a list of the medicines you take. How can you care for yourself at home? Ease discomfort from your baby's kicking  · Change your position. Sometimes this will cause your baby to change position too. · Take a deep breath while you raise your arm over your head. Then breathe out while you drop your arm. Do Kegel exercises to prevent urine from leaking  · You can do Kegel exercises while you stand or sit. ¨ Squeeze the same muscles you would use to stop your urine. Your belly and thighs should not move. ¨ Hold the squeeze for 3 seconds, and then relax for 3 seconds. ¨ Start with 3 seconds. Then add 1 second each week until you are able to squeeze for 10 seconds. ¨ Repeat the exercise 10 to 15 times for each session. Do three or more sessions each day. Ease or reduce swelling in your feet, ankles, hands, and fingers  · If your fingers are puffy, take off your rings. · Do not eat high-salt foods, such as potato chips. · Prop up your feet on a stool or couch as much as possible. Sleep with pillows under your feet. · Do not stand for long periods of time or wear tight shoes. · Wear support stockings. Where can you learn more?   Go to http://lakshmi-alonso.info/. Enter G264 in the search box to learn more about \"Weeks 22 to 26 of Your Pregnancy: Care Instructions. \"  Current as of: March 16, 2017  Content Version: 11.3  © 1221-2138 [employer]. Care instructions adapted under license by [employer] (which disclaims liability or warranty for this information). If you have questions about a medical condition or this instruction, always ask your healthcare professional. [address] any warranty or liability for your use of this information.

## 2017-10-03 NOTE — ED PROVIDER NOTES
HPI Comments: 59-year-old  at 25 weeks presents with complaints of increasing depression with some suicidal thoughts over past several weeks. Patient reports increased stress over past for 8 hours regarding family support and pregnancy. Patient reports she is unable to take her Zoloft secondary to nausea and vomiting of pregnancy. Denies any self injury today. Has not discussed her depression with her OB, Dr. Eneida Bowser. Positive fetal movement. Denies any abdominal or vaginal complaints. Denies any recent illness, fever, chills, chest pain, shortness of breath, abdominal pain, back pain, urinary complaints. No current suicidal thoughts. Regular tobacco use  Regular marijuana use  Denies current alcohol use with pregnancy  OB-hartle    The history is provided by the patient. Past Medical History:   Diagnosis Date    Abnormal Pap smear     Routine gynecological examination     [facility]       Past Surgical History:   Procedure Laterality Date    HX GYN      HX TONSILLECTOMY           Family History:   Problem Relation Age of Onset    Hypertension Father        Social History     Social History    Marital status:      Spouse name: N/A    Number of children: N/A    Years of education: N/A     Occupational History    Not on file. Social History Main Topics    Smoking status: Current Every Day Smoker     Packs/day: 1.00     Years: 6.00     Types: Cigarettes    Smokeless tobacco: Never Used    Alcohol use 6.0 oz/week     12 Cans of beer per week      Comment: varies    Drug use: Yes     Special: Marijuana    Sexual activity: Yes     Partners: Male     Birth control/ protection: Implant     Other Topics Concern    Not on file     Social History Narrative    ** Merged History Encounter **              ALLERGIES: Review of patient's allergies indicates no known allergies. Review of Systems   Constitutional: Negative for chills and fever.    HENT: Negative for congestion, nosebleeds and rhinorrhea. Eyes: Negative for pain and redness. Respiratory: Negative for cough and shortness of breath. Cardiovascular: Negative for chest pain and palpitations. Gastrointestinal: Negative for abdominal pain, nausea and vomiting. Genitourinary: Negative for dysuria, frequency, vaginal bleeding and vaginal pain. Musculoskeletal: Negative for myalgias. Skin: Negative for rash and wound. Neurological: Negative for seizures, syncope and weakness. Hematological: Does not bruise/bleed easily. Psychiatric/Behavioral: Positive for dysphoric mood and suicidal ideas. Negative for agitation, confusion and self-injury. The patient is not nervous/anxious. Vitals:    10/02/17 2143   BP: 127/79   Pulse: 89   Resp: 18   Temp: 98.2 °F (36.8 °C)   SpO2: 97%   Weight: 66.8 kg (147 lb 3.2 oz)   Height: 5' 8\" (1.727 m)            Physical Exam   Constitutional: She is oriented to person, place, and time. She appears well-developed and well-nourished. HENT:   Head: Normocephalic and atraumatic. Eyes: EOM are normal. Pupils are equal, round, and reactive to light. Neck: Normal range of motion. Neck supple. No tracheal deviation present. Cardiovascular: Normal rate, regular rhythm, normal heart sounds and intact distal pulses. Pulmonary/Chest: Effort normal and breath sounds normal. No stridor. No respiratory distress. She has no wheezes. She has no rales. She exhibits no tenderness. Abdominal: Soft. Bowel sounds are normal. She exhibits no distension. There is no tenderness. There is no rebound. Musculoskeletal: Normal range of motion. She exhibits no edema or tenderness. Neurological: She is alert and oriented to person, place, and time. No cranial nerve deficit. Skin: Skin is warm and dry. No rash noted. No pallor. Psychiatric: Her behavior is normal. She exhibits a depressed mood. She expresses no suicidal ideation. She expresses no suicidal plans.    Nursing note and vitals reviewed. MDM  Number of Diagnoses or Management Options  25 weeks gestation of pregnancy:   Depression, unspecified depression type:   Diagnosis management comments: 31-year-old female, approximately 25 weeks pregnant, presents with increasing depression and home stress over the past several weeks. Patient is well-appearing, in no acute distress, hemodynamically stable, neurologically intact, abdomen soft/nontender/nondistended, fetal heart tones 152. Denies any current suicidal thoughts. Denies any suicidal actions. Plan-B. Smart, IV fluid hydration, p.o. hydration, CBC/CMP/acetaminophen level/salicylate level/urine drug screen/urinalysis. Labs remarkable for WBC 20, +THC       Amount and/or Complexity of Data Reviewed  Clinical lab tests: ordered and reviewed  Discuss the patient with other providers: yes    Risk of Complications, Morbidity, and/or Mortality  Presenting problems: moderate  Diagnostic procedures: moderate  Management options: moderate    Patient Progress  Patient progress: stable    ED Course       Procedures    BRAXTON Flaherty discussed with psychiatry. Advised imporved sleep hygiene and followup with [employer] tomorrow. We'll discuss with OB. CONSULT NOTE:  11:29 PM Derrek Martinez MD spoke with Dr. Daria Norris, Consult for OB. Discussed available diagnostic tests and clinical findings. Dr. Daria Norris recommends melatonin or unisom, will f/u with repeat CBC this week with Dr. Mehran Ruiz. Patient's results have been reviewed with them. Patient and/or family have verbally conveyed their understanding and agreement of the patient's signs, symptoms, diagnosis, treatment and prognosis and additionally agree to follow up as recommended or return to the Emergency Room should their condition change prior to follow-up.   Discharge instructions have also been provided to the patient with some educational information regarding their diagnosis as well a list of reasons why they would want to return to the ER prior to their follow-up appointment should their condition change.

## 2017-10-03 NOTE — ED TRIAGE NOTES
Pt arrives with depression x several weeks and \"a little bit\" of suicidal thoughts. Reports hx of depression/anxiety. Pt is 25 weeks pregnant and is unable to take her Prozac. Is prescribed Zoloft while pregnant but reports that she has been unable to take this due to nausea. Pt also adds that she thinks she is dehydrated from being nauseas and not drinking enough.

## 2017-10-03 NOTE — BSMART NOTE
Comprehensive Assessment Form Part 1      Section I - Disposition    Axis I - Insomnia with mild depression     Rule out Substance Induced Mood d/o (discontinued Prozac and Zoloft)      THC use  Axis II - deferred  Axis III - vomiting  Axis IV - stress caring for 3year old while being 25 weeks pregnant  Corea V - 48      The Medical Doctor to Psychiatrist conference was not completed. Medical doctor is in agreement with psychiatrist disposition because this counselor conveyed to ED physician the recommendation of the on-call psychiatrist and they concurred. The plan is to discharge to care of  and provide out patient resources, particularly [facility]. The on-call Psychiatrist consulted was Dr. Faustina Lopez. The admitting Psychiatrist will be Dr. Dilip Mehta. The admitting Diagnosis is n/a. The Payor source is BL[employer] MEDICAID - [facility] [employer]. Section II - Integrated Summary  Summary:    Patient is a 31 yo white female who arrives at ED accompanied by /Delroy, who appears very supportive, with chief complaint of insomnia and mild depression for the past several weeks and \"a little bit\" of suicidal thoughts but no plan. Patient says, \"I would never do that. I went through that as a teenager. I won't be doing that again. \" Patient reports 2 previous suicide attempts as a \"young teenager\" with no attempts since that time. Patient is 25 weeks pregnant and is unable to take her Prozac or Zoloft so has discontinued both saying, \"I stopped taking Prozac about 5 months ago. The Dr prescribed Zoloft instead but it made me throw up. My last dose was today. \" Her OBGYN/Dr Hernandez currently prescribes Zoloft while patient is pregnant but \"It causes me nausea. \"   Patient believes she might also be dehydrated from being nauseous and not drinking enough fluids.   Patient reports not sleeping well for the past several weeks and believes this has exacerbated her anxiety and depressed mood saying, \"I have to get up in the middle of the night to tend to my 3year old. \"  Patient denies suicidal ideation, denies homicidal ideation, denies auditory/visual hallucinations, is not delusional, and is oriented X4. Patient's ETOH is <10 and drug screen is positive for THC. Thought process was linear, logical, and goal directed as evidenced by patient intending to follow up with [facility] to inquire about counseling services and other available support as a pregnant mother. Patient has no history of psych admissions and is not currently followed by a psychiatrist or counselor. Patient has signs of fatigue and low level depression but no suicidal thoughts, plans, or impulses, and is not considered a suicidal risk at this time. Plan is to discharge patient to care of  and provide out patient resources, particularly contact info for [facility]. The patient has demonstrated mental capacity to provide informed consent. The information is given by the patient, spouse/SO and past medical records. The Chief Complaint is mild depression and insomnia. The Precipitant Factors are caring for 3year old while being pregnant. Previous Hospitalizations: none noted  The patient has not previously been in restraints. Current Psychiatrist and/or  is n/a. Lethality Assessment:    The potential for suicide noted by the following: previous history of attempts which occurred as a teenager in the form(s) of drug overdose, and current substance abuse. The potential for homicide is not noted. The patient has not been a perpetrator of sexual or physical abuse. There are not pending charges. The patient is not felt to be at risk for self harm or harm to others. The attending nurse was advised no further monitoring is necessary at this time. Section III - Psychosocial  The patient's overall mood and attitude is tired and lethargic. Feelings of helplessness and hopelessness are not observed. Generalized anxiety is not observed. Panic is not observed. Phobias are not observed. Obsessive compulsive tendencies are not observed. Section IV - Mental Status Exam  The patient's appearance shows no evidence of impairment. The patient's behavior shows no evidence of impairment. The patient is oriented to time, place, person and situation. The patient's speech is soft. The patient's mood is depressed (mild). The range of affect shows no evidence of impairment. The patient's thought content demonstrates no evidence of impairment. The thought process shows no evidence of impairment. The patient's perception shows no evidence of impairment. The patient's memory shows no evidence of impairment. The patient's appetite shows no evidence of impairment. The patient's sleep has evidence of insomnia. The patient's insight shows no evidence of impairment. The patient's judgement shows no evidence of impairment. Section V - Substance Abuse  The patient is using substances. The patient is using cannabis by inhalation for 5-10 years with last use on 10/2/17. The patient has experienced the following withdrawal symptoms: N/A. Section VI - Living Arrangements  The patient is . The spouses approximate age is 19's and appears to be in good health. The patient lives with a spouse. The patient has 2 children ages 3 and 11. The patient does plan to return home upon discharge. The patient does not have legal issues pending. The patient's source of income comes from employment. Jewish and cultural practices have not been voiced at this time. The patient's greatest support comes from  and this person will be involved with the treatment. The patient has not been in an event described as horrible or outside the realm of ordinary life experience either currently or in the past.  The patient has not been a victim of sexual/physical abuse.     Section VII - Other Areas of Clinical Concern  The highest grade achieved is some college with the overall quality of school experience being described as ok. The patient is currently employed and speaks [place] as a primary language. The patient has no communication impairments affecting communication. The patient's preference for learning can be described as: can read and write adequately.   The patient's hearing is normal.  The patient's vision is normal.      Ry Haji, STARR

## 2017-10-03 NOTE — ED NOTES
I have reviewed discharge instructions with the patient. The patient verbalized understanding. VSS. Pt ambulatory out of unit with spouse.

## 2017-10-04 ENCOUNTER — HOSPITAL ENCOUNTER (EMERGENCY)
Age: 27
Discharge: PSYCHIATRIC OR SUBSTANCE USE FACILITY | End: 2017-10-04
Attending: EMERGENCY MEDICINE
Payer: MEDICAID

## 2017-10-04 ENCOUNTER — HOSPITAL ENCOUNTER (INPATIENT)
Age: 27
LOS: 5 days | Discharge: HOME | DRG: 566 | End: 2017-10-09
Attending: PSYCHIATRY & NEUROLOGY | Admitting: PSYCHIATRY & NEUROLOGY
Payer: MEDICAID

## 2017-10-04 VITALS
HEART RATE: 87 BPM | DIASTOLIC BLOOD PRESSURE: 80 MMHG | RESPIRATION RATE: 16 BRPM | TEMPERATURE: 98.9 F | SYSTOLIC BLOOD PRESSURE: 94 MMHG | OXYGEN SATURATION: 97 %

## 2017-10-04 DIAGNOSIS — O99.342 DEPRESSION DURING PREGNANCY IN SECOND TRIMESTER: ICD-10-CM

## 2017-10-04 DIAGNOSIS — R45.851 SUICIDAL IDEATION: Primary | ICD-10-CM

## 2017-10-04 DIAGNOSIS — F32.A DEPRESSION DURING PREGNANCY IN SECOND TRIMESTER: ICD-10-CM

## 2017-10-04 LAB
ALBUMIN SERPL-MCNC: 3.3 G/DL (ref 3.5–5)
ALBUMIN/GLOB SERPL: 0.8 {RATIO} (ref 1.1–2.2)
ALP SERPL-CCNC: 116 U/L (ref 45–117)
ALT SERPL-CCNC: 19 U/L (ref 12–78)
AMPHET UR QL SCN: NEGATIVE
ANION GAP SERPL CALC-SCNC: 7 MMOL/L (ref 5–15)
APAP SERPL-MCNC: <2 UG/ML (ref 10–30)
APPEARANCE UR: CLEAR
AST SERPL-CCNC: 13 U/L (ref 15–37)
BACTERIA URNS QL MICRO: NEGATIVE /HPF
BARBITURATES UR QL SCN: NEGATIVE
BASOPHILS # BLD: 0 K/UL (ref 0–0.1)
BASOPHILS NFR BLD: 0 % (ref 0–1)
BENZODIAZ UR QL: NEGATIVE
BILIRUB SERPL-MCNC: 0.3 MG/DL (ref 0.2–1)
BILIRUB UR QL: NEGATIVE
BUN SERPL-MCNC: 8 MG/DL (ref 6–20)
BUN/CREAT SERPL: 14 (ref 12–20)
CALCIUM SERPL-MCNC: 8.4 MG/DL (ref 8.5–10.1)
CANNABINOIDS UR QL SCN: POSITIVE
CHLORIDE SERPL-SCNC: 108 MMOL/L (ref 97–108)
CO2 SERPL-SCNC: 24 MMOL/L (ref 21–32)
COCAINE UR QL SCN: NEGATIVE
COLOR UR: NORMAL
CREAT SERPL-MCNC: 0.59 MG/DL (ref 0.55–1.02)
DRUG SCRN COMMENT,DRGCM: ABNORMAL
EOSINOPHIL # BLD: 0.1 K/UL (ref 0–0.4)
EOSINOPHIL NFR BLD: 0 % (ref 0–7)
EPITH CASTS URNS QL MICRO: NORMAL /LPF
ERYTHROCYTE [DISTWIDTH] IN BLOOD BY AUTOMATED COUNT: 14 % (ref 11.5–14.5)
ETHANOL SERPL-MCNC: <10 MG/DL
GLOBULIN SER CALC-MCNC: 3.9 G/DL (ref 2–4)
GLUCOSE SERPL-MCNC: 83 MG/DL (ref 65–100)
GLUCOSE UR STRIP.AUTO-MCNC: NEGATIVE MG/DL
HCT VFR BLD AUTO: 38.7 % (ref 35–47)
HGB BLD-MCNC: 13.1 G/DL (ref 11.5–16)
HGB UR QL STRIP: NEGATIVE
HYALINE CASTS URNS QL MICRO: NORMAL /LPF (ref 0–5)
KETONES UR QL STRIP.AUTO: NEGATIVE MG/DL
LEUKOCYTE ESTERASE UR QL STRIP.AUTO: NEGATIVE
LYMPHOCYTES # BLD: 2.1 K/UL (ref 0.8–3.5)
LYMPHOCYTES NFR BLD: 15 % (ref 12–49)
MCH RBC QN AUTO: 28.9 PG (ref 26–34)
MCHC RBC AUTO-ENTMCNC: 33.9 G/DL (ref 30–36.5)
MCV RBC AUTO: 85.2 FL (ref 80–99)
METHADONE UR QL: NEGATIVE
MONOCYTES # BLD: 0.5 K/UL (ref 0–1)
MONOCYTES NFR BLD: 3 % (ref 5–13)
NEUTS SEG # BLD: 11.8 K/UL (ref 1.8–8)
NEUTS SEG NFR BLD: 82 % (ref 32–75)
NITRITE UR QL STRIP.AUTO: NEGATIVE
OPIATES UR QL: NEGATIVE
PCP UR QL: NEGATIVE
PH UR STRIP: 7 [PH] (ref 5–8)
PLATELET # BLD AUTO: 216 K/UL (ref 150–400)
POTASSIUM SERPL-SCNC: 4.1 MMOL/L (ref 3.5–5.1)
PROT SERPL-MCNC: 7.2 G/DL (ref 6.4–8.2)
PROT UR STRIP-MCNC: NEGATIVE MG/DL
RBC # BLD AUTO: 4.54 M/UL (ref 3.8–5.2)
RBC #/AREA URNS HPF: NORMAL /HPF (ref 0–5)
SALICYLATES SERPL-MCNC: 2.2 MG/DL (ref 2.8–20)
SODIUM SERPL-SCNC: 139 MMOL/L (ref 136–145)
SP GR UR REFRACTOMETRY: 1.01 (ref 1–1.03)
UA: UC IF INDICATED,UAUC: NORMAL
UROBILINOGEN UR QL STRIP.AUTO: 1 EU/DL (ref 0.2–1)
WBC # BLD AUTO: 14.5 K/UL (ref 3.6–11)
WBC URNS QL MICRO: NORMAL /HPF (ref 0–4)

## 2017-10-04 PROCEDURE — 99285 EMERGENCY DEPT VISIT HI MDM: CPT

## 2017-10-04 PROCEDURE — 74011250636 HC RX REV CODE- 250/636: Performed by: PSYCHIATRY & NEUROLOGY

## 2017-10-04 PROCEDURE — 74011250637 HC RX REV CODE- 250/637: Performed by: PSYCHIATRY & NEUROLOGY

## 2017-10-04 PROCEDURE — 85025 COMPLETE CBC W/AUTO DIFF WBC: CPT | Performed by: EMERGENCY MEDICINE

## 2017-10-04 PROCEDURE — 80307 DRUG TEST PRSMV CHEM ANLYZR: CPT | Performed by: EMERGENCY MEDICINE

## 2017-10-04 PROCEDURE — 81001 URINALYSIS AUTO W/SCOPE: CPT | Performed by: HOSPITALIST

## 2017-10-04 PROCEDURE — 90791 PSYCH DIAGNOSTIC EVALUATION: CPT

## 2017-10-04 PROCEDURE — 74011250637 HC RX REV CODE- 250/637: Performed by: HOSPITALIST

## 2017-10-04 PROCEDURE — 80053 COMPREHEN METABOLIC PANEL: CPT | Performed by: EMERGENCY MEDICINE

## 2017-10-04 PROCEDURE — 36415 COLL VENOUS BLD VENIPUNCTURE: CPT | Performed by: EMERGENCY MEDICINE

## 2017-10-04 PROCEDURE — 65220000003 HC RM SEMIPRIVATE PSYCH

## 2017-10-04 RX ORDER — ZOLPIDEM TARTRATE 5 MG/1
5 TABLET ORAL
Status: DISCONTINUED | OUTPATIENT
Start: 2017-10-04 | End: 2017-10-05

## 2017-10-04 RX ORDER — ONDANSETRON 4 MG/1
4 TABLET, ORALLY DISINTEGRATING ORAL
Status: DISCONTINUED | OUTPATIENT
Start: 2017-10-04 | End: 2017-10-09 | Stop reason: HOSPADM

## 2017-10-04 RX ORDER — LORAZEPAM 2 MG/ML
2 INJECTION INTRAMUSCULAR
Status: DISCONTINUED | OUTPATIENT
Start: 2017-10-04 | End: 2017-10-09 | Stop reason: HOSPADM

## 2017-10-04 RX ORDER — ADHESIVE BANDAGE
30 BANDAGE TOPICAL DAILY PRN
Status: DISCONTINUED | OUTPATIENT
Start: 2017-10-04 | End: 2017-10-09 | Stop reason: HOSPADM

## 2017-10-04 RX ORDER — BENZTROPINE MESYLATE 2 MG/1
2 TABLET ORAL
Status: DISCONTINUED | OUTPATIENT
Start: 2017-10-04 | End: 2017-10-05

## 2017-10-04 RX ORDER — LORAZEPAM 0.5 MG/1
0.5 TABLET ORAL
Status: DISCONTINUED | OUTPATIENT
Start: 2017-10-04 | End: 2017-10-09 | Stop reason: HOSPADM

## 2017-10-04 RX ORDER — IBUPROFEN 400 MG/1
400 TABLET ORAL
Status: DISCONTINUED | OUTPATIENT
Start: 2017-10-04 | End: 2017-10-04

## 2017-10-04 RX ORDER — BENZTROPINE MESYLATE 1 MG/ML
2 INJECTION INTRAMUSCULAR; INTRAVENOUS
Status: DISCONTINUED | OUTPATIENT
Start: 2017-10-04 | End: 2017-10-05

## 2017-10-04 RX ORDER — SWAB
1 SWAB, NON-MEDICATED MISCELLANEOUS DAILY
Status: DISCONTINUED | OUTPATIENT
Start: 2017-10-05 | End: 2017-10-09 | Stop reason: HOSPADM

## 2017-10-04 RX ORDER — ACETAMINOPHEN 325 MG/1
650 TABLET ORAL
Status: DISCONTINUED | OUTPATIENT
Start: 2017-10-04 | End: 2017-10-05

## 2017-10-04 RX ORDER — IBUPROFEN 200 MG
1 TABLET ORAL
Status: DISCONTINUED | OUTPATIENT
Start: 2017-10-04 | End: 2017-10-09 | Stop reason: HOSPADM

## 2017-10-04 RX ADMIN — ONDANSETRON 4 MG: 4 TABLET, ORALLY DISINTEGRATING ORAL at 15:40

## 2017-10-04 RX ADMIN — LORAZEPAM 0.5 MG: 0.5 TABLET ORAL at 21:20

## 2017-10-04 RX ADMIN — ZOLPIDEM TARTRATE 5 MG: 5 TABLET ORAL at 21:20

## 2017-10-04 RX ADMIN — LORAZEPAM 2 MG: 2 INJECTION INTRAMUSCULAR at 15:40

## 2017-10-04 RX ADMIN — ONDANSETRON 4 MG: 4 TABLET, ORALLY DISINTEGRATING ORAL at 21:52

## 2017-10-04 NOTE — BH NOTES
TRANSFER - IN REPORT:  At 1206  Verbal report received from Yvonne Purcell (name) on Gin Amin  being received from [facility] (unit) for routine progression of care      Report consisted of patients Situation, Background, Assessment and   Recommendations(SBAR). Information from the following report(s) SBAR was reviewed with the receiving nurse. Opportunity for questions and clarification was provided. Assessment completed upon patients arrival to unit and care assumed.

## 2017-10-04 NOTE — BH NOTES
At 1400:  RN was told by [facility] that patient went to room slammed the door. RN went to assess the patient: This patient sitting in the room. Has torn an entire journal into shreds of paper on the floor beneath her feet. Staring at her feet on the side of the bed. Mumbling to self. When RN asked patient questions she did not speak. Only when RN asked if was SI or HI. Then patient screamed, \"I want to die\" and began crying and ringing her hands, rocking back and forth on side of the bed. RN informed the charge nurse. Charge nurse called MD. Demetrio Mari for transfer received. RN talked with and re-assured patient. Patient walked with RN to acute unit while crying but no hands on for transfer required. Pt did refuse to eat meal that was given to her and refused offer of prn medication. Also, the OB nurse did come to do her assessment but patient too unpredictable and this RN said to try again later.

## 2017-10-04 NOTE — PROGRESS NOTES
Consult dictated #[id number]    Leukocytosis - likely reactive to dehydration, recheck UA, monitor  Nausea/vomiting - possibly viral gastroenteritis, hyperemesis seems less likely, prn zofran  Dehydration - antiemetics and PO fluids, only place IV if she is unable to tolerate  MJ use - could be causing nausea as well, monitor  Tobacco use - nicotine patch  Pregnancy - check TSH, consult OB hospitalist    Agnieszka Alexander MD

## 2017-10-04 NOTE — CONSULTS
OB Consult    Subjective:     Almita Carlisle is a 32 y.o.   female who is being seen for evaluation in ED pending transfer to psychiatric inpatient facility. She is  AB 1 at 26 weeks, see ED provider notes for Hx of current ED admission. She denies injury today; denies bleeding, ROM, contractions, pain, NVFSC. Baby active. Obtains her OB care at [facility]. OB/GYN ROS: as above  OB/GYN history: G 4 P2  AB 1:  CS X 2;  TAB X 1    Patient Active Problem List    Diagnosis Date Noted    Abnormal Pap smear      Past Medical History:   Diagnosis Date    Abnormal Pap smear     Routine gynecological examination     [facility]      Past Surgical History:   Procedure Laterality Date    HX GYN      HX TONSILLECTOMY        Social History   Substance Use Topics    Smoking status: Current Every Day Smoker     Packs/day: 1.00     Years: 6.00     Types: Cigarettes    Smokeless tobacco: Never Used    Alcohol use 6.0 oz/week     12 Cans of beer per week      Comment: varies      Family History   Problem Relation Age of Onset    Hypertension Father       Prior to Admission Medications   Prescriptions Last Dose Informant Patient Reported? Taking? PRENATAL VIT CALC,IRON,FOLIC (PRENATAL VITAMIN PO)   Yes Yes   Sig: Take  by mouth. albuterol (PROVENTIL HFA, VENTOLIN HFA) 90 mcg/actuation inhaler Not Taking at Unknown time  No No   Sig: Take 2 Puffs by inhalation every four (4) hours as needed for Wheezing. amoxicillin (AMOXIL) 500 mg capsule Not Taking at Unknown time  No No   Sig: Take 2 Caps by mouth two (2) times a day. doxylamine succinate (UNISOM, DOXYLAMINE,) 25 mg tablet Not Taking at Unknown time  No No   Sig: Take 1 Tab by mouth nightly as needed for Sleep.       Facility-Administered Medications: None     No Known Allergies     Review of Systems:  General: Denies fever, sweats, chills  CV: Denies CP, palpitations  Resp: Denies SOB, cough  GI: Denies nausea, vomiting, diarrhea  : Denies dysura, abnormal frequency, hematuria  Neuro: Denies HA, visual disturbance    Objective:     Patient Vitals for the past 8 hrs:   BP Temp Pulse Resp SpO2   10/04/17 1044 113/71 98.2 °F (36.8 °C) 84 20 99 %   10/04/17 0900 117/72 - 72 22 99 %     Temp (24hrs), Av.2 °F (36.8 °C), Min:98.2 °F (36.8 °C), Max:98.2 °F (36.8 °C)         Physical Exam:   Examination:  General:  In NAD  Abdomen: soft, nontender, no abnormal masses, rebound, rigidity, guarding  Pelvic:  Extremities: No abnormal cyanosis, clubbing, edema noted  Screening neurological exam: within normal limits    Labs:    Recent Results (from the past 24 hour(s))   CBC WITH AUTOMATED DIFF    Collection Time: 10/04/17 10:14 AM   Result Value Ref Range    WBC 14.5 (H) 3.6 - 11.0 K/uL    RBC 4.54 3.80 - 5.20 M/uL    HGB 13.1 11.5 - 16.0 g/dL    HCT 38.7 35.0 - 47.0 %    MCV 85.2 80.0 - 99.0 FL    MCH 28.9 26.0 - 34.0 PG    MCHC 33.9 30.0 - 36.5 g/dL    RDW 14.0 11.5 - 14.5 %    PLATELET 768 394 - 083 K/uL    NEUTROPHILS 82 (H) 32 - 75 %    LYMPHOCYTES 15 12 - 49 %    MONOCYTES 3 (L) 5 - 13 %    EOSINOPHILS 0 0 - 7 %    BASOPHILS 0 0 - 1 %    ABS. NEUTROPHILS 11.8 (H) 1.8 - 8.0 K/UL    ABS. LYMPHOCYTES 2.1 0.8 - 3.5 K/UL    ABS. MONOCYTES 0.5 0.0 - 1.0 K/UL    ABS. EOSINOPHILS 0.1 0.0 - 0.4 K/UL    ABS. BASOPHILS 0.0 0.0 - 0.1 K/UL   METABOLIC PANEL, COMPREHENSIVE    Collection Time: 10/04/17 10:14 AM   Result Value Ref Range    Sodium 139 136 - 145 mmol/L    Potassium 4.1 3.5 - 5.1 mmol/L    Chloride 108 97 - 108 mmol/L    CO2 24 21 - 32 mmol/L    Anion gap 7 5 - 15 mmol/L    Glucose 83 65 - 100 mg/dL    BUN 8 6 - 20 MG/DL    Creatinine 0.59 0.55 - 1.02 MG/DL    BUN/Creatinine ratio 14 12 - 20      GFR est AA >60 >60 ml/min/1.73m2    GFR est non-AA >60 >60 ml/min/1.73m2    Calcium 8.4 (L) 8.5 - 10.1 MG/DL    Bilirubin, total 0.3 0.2 - 1.0 MG/DL    ALT (SGPT) 19 12 - 78 U/L    AST (SGOT) 13 (L) 15 - 37 U/L    Alk.  phosphatase 116 45 - 117 U/L    Protein, total 7.2 6.4 - 8.2 g/dL    Albumin 3.3 (L) 3.5 - 5.0 g/dL    Globulin 3.9 2.0 - 4.0 g/dL    A-G Ratio 0.8 (L) 1.1 - 2.2     SALICYLATE    Collection Time: 10/04/17 10:14 AM   Result Value Ref Range    Salicylate level 2.2 (L) 2.8 - 20.0 MG/DL   ACETAMINOPHEN    Collection Time: 10/04/17 10:14 AM   Result Value Ref Range    Acetaminophen level <2 (L) 10 - 30 ug/mL   ETHYL ALCOHOL    Collection Time: 10/04/17 10:14 AM   Result Value Ref Range    ALCOHOL(ETHYL),SERUM <10 <10 MG/DL     NonStress Test:  FHT baseline 150 bpm, adequate variability and reactivity consistent with gestational age; accelerations noted; no significant decelerations noted    Assessment:     26 week IUP  Admitted ED with Suicidial Ideations,   Screening fetal evaluation reassuring. Plan:     I reviewed with Helder Galeana her medical records, physical exam, and review of symptoms. Planning transfer [facility] inpatient psychiatric for further evaluation and care.     Signed By: Pratima Davis MD     October 4, 2017

## 2017-10-04 NOTE — BH NOTES
Ativan, 2 mg given IM for severe agitation. 1645  Pt reports feeling more relaxed. Better able to tolerate the milieu.

## 2017-10-04 NOTE — PROGRESS NOTES
CM contacted [address] [place] [address] for pre-auth # for [facility] transport. Patient to be transported to [facility]. Auth #[id number].     [facility] ETA is 12:30PM.    Shanna Hu RN, BSN, [facility]  ED Case Manager  [phone]

## 2017-10-04 NOTE — PROGRESS NOTES
TRANSFER - IN REPORT:    Verbal report received from Lidia Cortez RN(name) on Viktoriya Roblero  being received from [facility]) for routine progression of care      Report consisted of patients Situation, Background, Assessment and   Recommendations(SBAR). Information from the following report(s) SBAR was reviewed with the receiving nurse. Opportunity for questions and clarification was provided. Assessment completed upon patients arrival to unit and care assumed.

## 2017-10-04 NOTE — IP AVS SNAPSHOT
Jalen [address] [place] [address] 
[phone] Patient: Travis Ochoa MRN: [id number] :1990 Current Discharge Medication List  
  
START taking these medications Dose & Instructions Dispensing Information Comments Morning Noon Evening Bedtime FLUoxetine 20 mg capsule Commonly known as:  PROzac Your next dose is:  Tomorrow at 9am  
   
 Dose:  20 mg Take 1 Cap by mouth daily. Indications: Generalized Anxiety Disorder, major depressive disorder Quantity:  15 Cap Refills:  1 CONTINUE these medications which have NOT CHANGED Dose & Instructions Dispensing Information Comments Morning Noon Evening Bedtime  
 albuterol 90 mcg/actuation inhaler Commonly known as:  PROVENTIL HFA, VENTOLIN HFA, PROAIR HFA Notes to Patient:  You did not use this today Dose:  2 Puff Take 2 Puffs by inhalation every four (4) hours as needed for Wheezing. Quantity:  1 Inhaler Refills:  2 PRENATAL VITAMIN PO Your next dose is:  Tomorrow at 9am  
   
 Take  by mouth. Refills:  0 STOP taking these medications   
 doxylamine succinate 25 mg tablet Commonly known as:  UNISOM (DOXYLAMINE) Where to Get Your Medications Information on where to get these meds will be given to you by the nurse or doctor. ! Ask your nurse or doctor about these medications FLUoxetine 20 mg capsule

## 2017-10-04 NOTE — PROGRESS NOTES
Problem: Depressed Mood (Adult/Pediatric)  Goal: *STG: Participates in treatment plan  Patient is sitting in the day room. Flat sad affect. Tearful at times. Staff present. Will continue to monitor. 1730 - Patient is meds and meals compliant. Received PRN medication for increased anxiety and nausea. Patient stated that medications were effective.

## 2017-10-04 NOTE — CONSULTS
[address], [address] [place] [address]   [facility]       Name:  Mitzy Morales   MR#:  [id number]   :  1990   Account #:  [de-identified]    Date of Consultation:     Date of Adm:  10/04/2017       REQUESTING PHYSICIAN: Laura Mauricio MD    REASON FOR CONSULTATION: Leukocytosis, nausea and   dehydration. HISTORY OF PRESENT ILLNESS: The patient is a 54-year-old   female who is admitted to [address] [place] [address] for depression with   suicidal ideation. We were asked to consult due to nausea and   dehydration. The patient says that she has had a lot of trouble this   pregnancy with nausea. It has been especially bad in the past few   days. She says she has been vomiting over the past few days and had   a little diarrhea. She feels like she has been able to eat and drink just a   little bit at home, but has been getting progressively dehydrated. She   denies any abdominal pain, hematemesis, melena. She notes that she   does smoke marijuana daily and has done so for the past 10 years. She feels that the marijuana does help her with nausea. The patient is   25 weeks pregnant. She is a G4, P2. She denies any abnormal vaginal   bleeding or discharge. She notes that she has been weak and shaky. She has a headache. She denies neck stiffness. She has not had any   shortness of breath, cough, chest pain. No abdominal pain. She denies   dysuria, hematuria, or urinary frequency. No rashes. No itching. PAST MEDICAL HISTORY: Depression. MEDICATIONS: Zoloft, though the patient states she has been unable   to take this secondary to nausea and vomiting. ALLERGIES: NO KNOWN DRUG ALLERGIES. REVIEW OF SYSTEMS: The patient denies any joint swelling, rashes,   or weight loss. No dysuria, hematuria, or urinary frequency. Otherwise,   10-point review of systems is negative except as mentioned in HPI.     FAMILY HISTORY: Reviewed for recent gastrointestinal infections and   is noncontributory. SOCIAL HISTORY: The patient lives with her  and 2 children. She endorses tobacco smoking. No alcohol, but does smoke marijuana   daily as mentioned above. Denies IV drug use. PHYSICAL EXAMINATION   VITAL SIGNS: Temperature 36.7, pulse 72, blood pressure 124/71,   respirations 16, oxygen saturation 99% on room air. GENERAL APPEARANCE: The patient is a tearful, young woman in   no acute distress. EYES: Pupils equal, round, reactive to light. Extraocular movements   intact. No scleral icterus. No conjunctival pallor. ENT: Mucous membranes are dry. Oropharynx is benign. NECK: Supple, no jugular venous distention, no thyromegaly. CARDIOVASCULAR: Heart is regular rate and rhythm without   murmurs, rubs or gallops. RESPIRATORY: Lungs are clear to auscultation bilaterally. GI: Abdomen soft, nontender, gravid and positive bowel sounds. GENITOURINARY: No costovertebral angle tenderness. Bladder is   nonpalpable in lower abdomen. SKIN: No rash, pallor, or jaundice. MUSCULOSKELETAL: Muscle tone and bulk are normal. There is no   cyanosis, clubbing, or edema. NEUROLOGICAL: The patient is alert and oriented x3. Cranial nerves   2 through 12 are intact. She is moving all 4 extremities without focal   deficits. PSYCHIATRIC: The patient is cooperative, but tearful, anxious and   fairly labile. SIGNIFICANT LABORATORY DATA: White blood cell count 14.5,   hemoglobin 13.1, platelets 669, sodium 139, potassium 4.1, BUN 8,   creatinine 0.59. Bilirubin 0.3, AST 13, ALT 19, alkaline phosphatase   116. Her acetaminophen level is less than 2. Alcohol level less than   10. Her urine drug screen is positive for THC. ASSESSMENT: The patient is a 77-year-old female who presents for   depression and suicidal ideation. We were asked to consult for   ongoing issues. PROBLEMS   1. Leukocytosis. The patient has leukocytosis.  She was evaluated in   the ER 2 days ago where she was found to have a low white blood cell   count of 20. It has come down to 14.5. At that time, she was given a   liter fluids and discharged. A UA on 10/02 was positive for ketones and   mucus, but otherwise, unremarkable for infection. I think it would be   reasonable to recheck a UA. She has no signs or symptoms of   infection besides the nausea and vomiting. Certainly, this could   represent a viral gastroenteritis. Her leukocytosis could also be caused   by an exaggerated response in a healthy person to dehydration. What I   would like to do now is recheck a UA, monitor CBC and treat any signs   of infection if they arise. 2. Nausea and vomiting. The patient had nausea and vomiting. She is   out of the first trimester, so I am not entirely sure this represents   hyperemesis gravidarum. That being said, I think it is the main cause   of her dehydration, so I do think we need to act. I think oral Zofran   should allow her to keep down liquids. She has good bowel sounds. No reason for me to suspect an obstruction or other issues that would   require imaging of her abdomen. We will monitor and treat as needed. 3. Dehydration. The patient is dehydrated secondary to nausea and   vomiting. I have asked her to drink Gatorade as well as antiemetics. If   she is unable to keep this down, we will place and IV and rehydrate her   with IV fluids. 4. Marijuana use. The patient has daily marijuana use and certainly   could have marijuana-induced nausea, though it seems a little sudden   onset for someone who has been using marijuana for the past 10   years. She was counseled on cessation given the risks of any sort   smoking in pregnancy. 5. Tobacco abuse. The patient was also told not smoke cigarettes and   counseled on cessation for this. She will be provided with a nicotine   patch as needed. 6. Pregnancy. The patient is 25 weeks pregnant.  I would like to check a   TSH and consult the [facility] hospitalist. She tells me she has been   receiving regular prenatal care with Dr. David [facility] and hopefully we can obtain records from them to see if there   is anything else we can do to aid in her prenatal care. 7. Depression with suicidal ideation. This will be treated by the   psychiatrist.     Thank you very much for this consultation. We will continue to follow   along with this patient. Please do not hesitate to call with any questions.         MD Tia Mckeon / Nava   D:  10/04/2017   15:35   T:  10/04/2017   16:16   Job #:  [id number]

## 2017-10-04 NOTE — IP AVS SNAPSHOT
[address] 
[phone] Patient: Travis Ochoa MRN: [id number] :1990 You are allergic to the following No active allergies Recent Documentation Weight Breastfeeding? BMI OB Status Smoking Status 65.6 kg No 21.99 kg/m2 Pregnant Current Every Day Smoker Emergency Contacts Name Discharge Info Relation Home Work Mobile [address] CAREGIVER [3] Spouse [3] [phone] About your hospitalization You were admitted on:  2017 You last received care in the:  [address] You were discharged on:  2017 Unit phone number:  [phone] Why you were hospitalized Your primary diagnosis was: Major Depressive Disorder, Recurrent Episode, Moderate Degree (Hcc) Providers Seen During Your Hospitalizations Provider Role Specialty Primary office phone Vaishnavi Bolivar MD Attending Provider Psychiatry [phone] Your Primary Care Physician (PCP) Primary Care Physician Office Phone Office Fax NONE ** None ** ** None ** Follow-up Information Follow up With Details Comments Contact Info None   None (395) Patient stated that they have no PCP Dr. Janett Lira On 10/23/2017 Monday [address] [facility], [address] (Located at the [address] [place] [address] at [facility]) [place] 
[phone] or [phone] Rene Mullins On 2017 You have an 8:30am appointment with a psychiatrist for medication management. Please arrive 30 minutes early to complete new patient paperwork. Remember to bring your photo ID and insurance card. [address] [place] [address] [address] [place] 
[phone] [facility] On 10/17/2017 You have 1:00pm appointment for therapy. Please arrive 20 minutes to complete new patient paperwork.  Remember to bring your photo ID and insurance card.  [place] [address], [address] 
[phone] Current Discharge Medication List  
  
START taking these medications Dose & Instructions Dispensing Information Comments Morning Noon Evening Bedtime FLUoxetine 20 mg capsule Commonly known as:  PROzac Your next dose is:  Tomorrow at 9am  
   
 Dose:  20 mg Take 1 Cap by mouth daily. Indications: Generalized Anxiety Disorder, major depressive disorder Quantity:  15 Cap Refills:  1 CONTINUE these medications which have NOT CHANGED Dose & Instructions Dispensing Information Comments Morning Noon Evening Bedtime  
 albuterol 90 mcg/actuation inhaler Commonly known as:  PROVENTIL HFA, VENTOLIN HFA, PROAIR HFA Notes to Patient:  You did not use this today Dose:  2 Puff Take 2 Puffs by inhalation every four (4) hours as needed for Wheezing. Quantity:  1 Inhaler Refills:  2 PRENATAL VITAMIN PO Your next dose is:  Tomorrow at 9am  
   
 Take  by mouth. Refills:  0 STOP taking these medications   
 doxylamine succinate 25 mg tablet Commonly known as:  UNISOM (DOXYLAMINE) Where to Get Your Medications Information on where to get these meds will be given to you by the nurse or doctor. ! Ask your nurse or doctor about these medications FLUoxetine 20 mg capsule Discharge Instructions DISCHARGE SUMMARY 
 
[address]ista : 1990 MRN: [id number] The patient Allen Caprenter exhibits the ability to control behavior in a less restrictive environment. Patient's level of functioning is improving. No assaultive/destructive behavior has been observed for the past 24 hours.   No suicidal/homicidal threat or behavior has been observed for the past 24 hours. There is no evidence of serious medication side effects. Patient has not been in physical or protective restraints for at least the past 24 hours. If weapons involved, how are they secured? No weapons involved. Is patient aware of and in agreement with discharge plan? Yes Arrangements for medication:  Prescriptions given to patient. Copy of discharge instructions to  provider?:  Dr. Christiana Rainey; Kayli Malhotra ([phone]) Arrangements for transportation home:   to . Keep all follow up appointments as scheduled, continue to take prescribed medications per physician instructions. Mental health crisis number:  709 or your local mental health crisis line number at [phone]. DISCHARGE SUMMARY from Nurse The following personal items are in your possession at time of discharge: 
 
Dental Appliances: None Visual Aid: None Home Medications: None Jewelry: Other (comment) (toe ring w pt) Clothing: Pants, Shirt, Slippers, Undergarments Other Valuables: Cell Phone, Lighter/matches, [place] (id/isurance/gift cars multiple) Personal Items Sent to Safe: cash, check card and ebt card PATIENT INSTRUCTIONS: 
 
 
What to do at Home: 
Recommended activity: Activity as tolerated, If you experience any of the following symptoms thoughts of harming self, feeling overwhelmed with anxiety, hopelessness, please follow up with your local crisis number. *  Please give a list of your current medications to your Primary Care Provider. *  Please update this list whenever your medications are discontinued, doses are 
    changed, or new medications (including over-the-counter products) are added. *  Please carry medication information at all times in case of emergency situations. These are general instructions for a healthy lifestyle: No smoking/ No tobacco products/ Avoid exposure to second hand smoke Surgeon General's Warning:  Quitting smoking now greatly reduces serious risk to your health. Obesity, smoking, and sedentary lifestyle greatly increases your risk for illness A healthy diet, regular physical exercise & weight monitoring are important for maintaining a healthy lifestyle You may be retaining fluid if you have a history of heart failure or if you experience any of the following symptoms:  Weight gain of 3 pounds or more overnight or 5 pounds in a week, increased swelling in our hands or feet or shortness of breath while lying flat in bed. Please call your doctor as soon as you notice any of these symptoms; do not wait until your next office visit. Recognize signs and symptoms of STROKE: 
 
F-face looks uneven A-arms unable to move or move unevenly S-speech slurred or non-existent T-time-call 911 as soon as signs and symptoms begin-DO NOT go Back to bed or wait to see if you get better-TIME IS BRAIN. Warning Signs of HEART ATTACK Call 911 if you have these symptoms: 
? Chest discomfort. Most heart attacks involve discomfort in the center of the chest that lasts more than a few minutes, or that goes away and comes back. It can feel like uncomfortable pressure, squeezing, fullness, or pain. ? Discomfort in other areas of the upper body. Symptoms can include pain or discomfort in one or both arms, the back, neck, jaw, or stomach. ? Shortness of breath with or without chest discomfort. ? Other signs may include breaking out in a cold sweat, nausea, or lightheadedness. Don't wait more than five minutes to call [address]! Fast action can save your life. Calling 911 is almost always the fastest way to get lifesaving treatment. Emergency Medical Services staff can begin treatment when they arrive  up to an hour sooner than if someone gets to the hospital by car. The discharge information has been reviewed with the patient.   The patient verbalized understanding. Discharge medications reviewed with the patient and appropriate educational materials and side effects teaching were provided. Discharge Orders None Introducing [facility]! Jay Koenig introduces "[employer] patient portal. Now you can access parts of your medical record, email your doctor's office, and request medication refills online. 1. In your internet browser, go to [email or web address] 2. Click on the First Time User? Click Here link in the Sign In box. You will see the New Member Sign Up page. 3. Enter your "[employer] Access Code exactly as it appears below. You will not need to use this code after youve completed the sign-up process. If you do not sign up before the expiration date, you must request a new code. · "[employer] Access Code: [email or web address] Expires: 12/31/2017  9:49 PM 
 
4. Enter the last four digits of your Social Security Number (xxxx) and Date of Birth (mm/dd/yyyy) as indicated and click Submit. You will be taken to the next sign-up page. 5. Create a "[employer] ID. This will be your "[employer] login ID and cannot be changed, so think of one that is secure and easy to remember. 6. Create a "[employer] password. You can change your password at any time. 7. Enter your Password Reset Question and Answer. This can be used at a later time if you forget your password. 8. Enter your e-mail address. You will receive e-mail notification when new information is available in [address] 9. Click Sign Up. You can now view and download portions of your medical record. 10. Click the Download Summary menu link to download a portable copy of your medical information. If you have questions, please visit the Frequently Asked Questions section of the "[employer] website. Remember, "[employer] is NOT to be used for urgent needs. For medical emergencies, dial 911. Now available from your iPhone and Android! General Information Please provide this summary of care documentation to your next provider. Patient Signature:  ____________________________________________________________ Date:  ____________________________________________________________  
  
Katar[place]yna [place] Provider Signature:  ____________________________________________________________ Date:  ____________________________________________________________

## 2017-10-04 NOTE — IP AVS SNAPSHOT
Summary of Care Report The Summary of Care report has been created to help improve care coordination. Users with access to [employer] or [address] (Web-based application) may access additional patient information including the Discharge Summary. If you are not currently a [address] user and need more information, please call the number listed below in the [place] section and ask to be connected with Medical Records. Facility Information Name Address Phone [address] [place] [phone] Patient Information Patient Name Sex  Lee Weber ([id number]) Female 1990 Discharge Information Admitting Provider Service Area Unit Dawson Mcneil MD / [facility] / [phone] Discharge Provider Discharge Date/Time Discharge Disposition Destination Dawson Mcneil MD / [phone] 10/09/17 1412 [facility] (none) Patient Language Language ENGLISH [13] Hospital Problems as of 10/9/2017  Reviewed: 2013  2:57 PM by Kendall aGrces MD  
  
  
  
 Class Noted - Resolved Last Modified POA Active Problems * (Principal)Major depressive disorder, recurrent episode, moderate degree ([facility] [address]  10/5/2017 - Present 10/5/2017 by Dawson Mcneil MD Yes Entered by Dawson Mcneil MD  
  
Non-Hospital Problems as of 10/9/2017  Reviewed: 2013  2:57 PM by Kendall Garces MD  
  
  
  
 Class Noted - Resolved Last Modified Active Problems Abnormal Pap smear  Unknown - Present 2013 by Kendall Garces MD  
  Entered by Kendall Garces MD  
  Depression  10/4/2017 - Present 10/5/2017 by Dawson Mcneil MD  
  Entered by Dawson Mcneil MD  
  
You are allergic to the following No active allergies Current Discharge Medication List  
  
 START taking these medications Dose & Instructions Dispensing Information Comments FLUoxetine 20 mg capsule Commonly known as:  PROzac Dose:  20 mg Take 1 Cap by mouth daily. Indications: Generalized Anxiety Disorder, major depressive disorder Quantity:  15 Cap Refills:  1 CONTINUE these medications which have NOT CHANGED Dose & Instructions Dispensing Information Comments  
 albuterol 90 mcg/actuation inhaler Commonly known as:  PROVENTIL HFA, VENTOLIN HFA, PROAIR HFA Notes to Patient:  You did not use this today Dose:  2 Puff Take 2 Puffs by inhalation every four (4) hours as needed for Wheezing. Quantity:  1 Inhaler Refills:  2 PRENATAL VITAMIN PO Take  by mouth. Refills:  0 STOP taking these medications Comments  
 doxylamine succinate 25 mg tablet Commonly known as:  UNISOM (DOXYLAMINE) Current Immunizations Name Date Tdap 2/27/2013 Follow-up Information Follow up With Details Comments Contact Info None   None (395) Patient stated that they have no PCP Dr. Serafin Canavan On 10/23/2017 Monday [address] [facility] [address] (Located at the [address] [place] [address]) [address] 
[phone] or [phone] Amrik Villava On 11/9/2017 You have an 8:30am appointment with a psychiatrist for medication management. Please arrive 30 minutes early to complete new patient paperwork. Remember to bring your photo ID and insurance card. [address] [facility] [address] [place] [address] 
[phone] Karen Lizarraga On 10/17/2017 You have 1:00pm appointment for therapy. Please arrive 20 minutes to complete new patient paperwork. Remember to bring your photo ID and insurance card. [facility], [address] 
[phone] Discharge Instructions DISCHARGE SUMMARY Beba Asif : 1990 MRN: [id number] The patient Celester Awe exhibits the ability to control behavior in a less restrictive environment. Patient's level of functioning is improving. No assaultive/destructive behavior has been observed for the past 24 hours. No suicidal/homicidal threat or behavior has been observed for the past 24 hours. There is no evidence of serious medication side effects. Patient has not been in physical or protective restraints for at least the past 24 hours. If weapons involved, how are they secured? No weapons involved. Is patient aware of and in agreement with discharge plan? Yes Arrangements for medication:  Prescriptions given to patient. Copy of discharge instructions to  provider?:  Dr. Erum Fang; Serena Lomax ([phone]) Arrangements for transportation home:   to . Keep all follow up appointments as scheduled, continue to take prescribed medications per physician instructions. Mental health crisis number:  [phone] or your local mental health crisis line number at [phone]. DISCHARGE SUMMARY from Nurse The following personal items are in your possession at time of discharge: 
 
Dental Appliances: None Visual Aid: None Home Medications: None Jewelry: Other (comment) (toe ring w pt) Clothing: Pants, Shirt, Slippers, Undergarments Other Valuables: Cell Phone, Lighter/matches, Sueanne Bitters (id/isurance/gift cars multiple) Personal Items Sent to Safe: cash, check card and ebt card PATIENT INSTRUCTIONS: 
 
 
What to do at Home: 
Recommended activity: Activity as tolerated, If you experience any of the following symptoms thoughts of harming self, feeling overwhelmed with anxiety, hopelessness, please follow up with your local crisis number. *  Please give a list of your current medications to your Primary Care Provider. *  Please update this list whenever your medications are discontinued, doses are 
    changed, or new medications (including over-the-counter products) are added. *  Please carry medication information at all times in case of emergency situations. These are general instructions for a healthy lifestyle: No smoking/ No tobacco products/ Avoid exposure to second hand smoke Surgeon General's Warning:  Quitting smoking now greatly reduces serious risk to your health. Obesity, smoking, and sedentary lifestyle greatly increases your risk for illness A healthy diet, regular physical exercise & weight monitoring are important for maintaining a healthy lifestyle You may be retaining fluid if you have a history of heart failure or if you experience any of the following symptoms:  Weight gain of 3 pounds or more overnight or 5 pounds in a week, increased swelling in our hands or feet or shortness of breath while lying flat in bed. Please call your doctor as soon as you notice any of these symptoms; do not wait until your next office visit. Recognize signs and symptoms of STROKE: 
 
F-face looks uneven A-arms unable to move or move unevenly S-speech slurred or non-existent T-time-call 911 as soon as signs and symptoms begin-DO NOT go Back to bed or wait to see if you get better-TIME IS BRAIN. Warning Signs of HEART ATTACK Call 911 if you have these symptoms: 
? Chest discomfort. Most heart attacks involve discomfort in the center of the chest that lasts more than a few minutes, or that goes away and comes back. It can feel like uncomfortable pressure, squeezing, fullness, or pain. ? Discomfort in other areas of the upper body. Symptoms can include pain or discomfort in one or both arms, the back, neck, jaw, or stomach. ? Shortness of breath with or without chest discomfort. ? Other signs may include breaking out in a cold sweat, nausea, or lightheadedness. Don't wait more than five minutes to call [address]! Fast action can save your life. Calling 911 is almost always the fastest way to get lifesaving treatment. Emergency Medical Services staff can begin treatment when they arrive  up to an hour sooner than if someone gets to the hospital by car. The discharge information has been reviewed with the patient. The patient verbalized understanding. Discharge medications reviewed with the patient and appropriate educational materials and side effects teaching were provided. Chart Review Routing History No Routing History on File

## 2017-10-04 NOTE — ED TRIAGE NOTES
Pt brought in by police on an ECO with c/o SI. Pt states \"I don't know\" and very tearful when asked. States 25 weeks pregnant with regular OB visits.  at this time.

## 2017-10-04 NOTE — ED NOTES
[facility] here to transport patient to [facility] at this time. Pt. Belongings sent with transport. Pt. Stable for transfer at this time. Pt. Discharged by Dr. Binta Smith.

## 2017-10-04 NOTE — BH NOTES
Patient was admitted to unit by Dr. Lindsay Alberto for depression. Patient came onto unit voluntarily but has been pre-screened for TDO. Patient was tearful on arrival, was given lunch and a tour of unit as well as handbook. Primary Nurse Delma Shelton, THEA and Charo Reyes RN, RN performed a dual skin assessment on this patient Impairment noted- see wound doc flow sheet  Javi score is 23. Patient has acne on back and tatoos on various parts of her body. Hospitalist consult called. Zorita Crigler, NP or Pako Sepulveda NP is being paged.
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
## 2017-10-05 PROBLEM — F33.1 MAJOR DEPRESSIVE DISORDER, RECURRENT EPISODE, MODERATE DEGREE (HCC): Status: ACTIVE | Noted: 2017-10-05

## 2017-10-05 LAB
BASOPHILS # BLD: 0 K/UL (ref 0–0.1)
BASOPHILS NFR BLD: 0 % (ref 0–1)
CHOLEST SERPL-MCNC: 286 MG/DL
EOSINOPHIL # BLD: 0.1 K/UL (ref 0–0.4)
EOSINOPHIL NFR BLD: 1 % (ref 0–7)
ERYTHROCYTE [DISTWIDTH] IN BLOOD BY AUTOMATED COUNT: 14.1 % (ref 11.5–14.5)
GLUCOSE P FAST SERPL-MCNC: 81 MG/DL (ref 65–100)
HCT VFR BLD AUTO: 37.4 % (ref 35–47)
HDLC SERPL-MCNC: 72 MG/DL
HDLC SERPL: 4 {RATIO} (ref 0–5)
HGB BLD-MCNC: 12.6 G/DL (ref 11.5–16)
LDLC SERPL CALC-MCNC: 180.4 MG/DL (ref 0–100)
LIPID PROFILE,FLP: ABNORMAL
LYMPHOCYTES # BLD: 2.4 K/UL (ref 0.8–3.5)
LYMPHOCYTES NFR BLD: 17 % (ref 12–49)
MCH RBC QN AUTO: 29.1 PG (ref 26–34)
MCHC RBC AUTO-ENTMCNC: 33.7 G/DL (ref 30–36.5)
MCV RBC AUTO: 86.4 FL (ref 80–99)
MONOCYTES # BLD: 0.5 K/UL (ref 0–1)
MONOCYTES NFR BLD: 4 % (ref 5–13)
NEUTS SEG # BLD: 11 K/UL (ref 1.8–8)
NEUTS SEG NFR BLD: 78 % (ref 32–75)
PLATELET # BLD AUTO: 208 K/UL (ref 150–400)
RBC # BLD AUTO: 4.33 M/UL (ref 3.8–5.2)
TRIGL SERPL-MCNC: 168 MG/DL (ref ?–150)
TSH SERPL DL<=0.05 MIU/L-ACNC: 1.21 UIU/ML (ref 0.36–3.74)
VLDLC SERPL CALC-MCNC: 33.6 MG/DL
WBC # BLD AUTO: 14.1 K/UL (ref 3.6–11)

## 2017-10-05 PROCEDURE — 80061 LIPID PANEL: CPT | Performed by: PSYCHIATRY & NEUROLOGY

## 2017-10-05 PROCEDURE — 74011250637 HC RX REV CODE- 250/637: Performed by: HOSPITALIST

## 2017-10-05 PROCEDURE — 74011250637 HC RX REV CODE- 250/637: Performed by: PSYCHIATRY & NEUROLOGY

## 2017-10-05 PROCEDURE — 65220000003 HC RM SEMIPRIVATE PSYCH

## 2017-10-05 PROCEDURE — 84443 ASSAY THYROID STIM HORMONE: CPT | Performed by: PSYCHIATRY & NEUROLOGY

## 2017-10-05 PROCEDURE — 85025 COMPLETE CBC W/AUTO DIFF WBC: CPT | Performed by: HOSPITALIST

## 2017-10-05 PROCEDURE — 36415 COLL VENOUS BLD VENIPUNCTURE: CPT | Performed by: PSYCHIATRY & NEUROLOGY

## 2017-10-05 PROCEDURE — 82947 ASSAY GLUCOSE BLOOD QUANT: CPT | Performed by: PSYCHIATRY & NEUROLOGY

## 2017-10-05 RX ORDER — FLUOXETINE 10 MG/1
10 CAPSULE ORAL DAILY
Status: DISCONTINUED | OUTPATIENT
Start: 2017-10-06 | End: 2017-10-06

## 2017-10-05 RX ORDER — ALBUTEROL SULFATE 0.83 MG/ML
2.5 SOLUTION RESPIRATORY (INHALATION)
Status: DISCONTINUED | OUTPATIENT
Start: 2017-10-05 | End: 2017-10-09 | Stop reason: HOSPADM

## 2017-10-05 RX ORDER — ALBUTEROL SULFATE 90 UG/1
2 AEROSOL, METERED RESPIRATORY (INHALATION)
Status: DISCONTINUED | OUTPATIENT
Start: 2017-10-05 | End: 2017-10-05 | Stop reason: CLARIF

## 2017-10-05 RX ORDER — DIPHENHYDRAMINE HCL 50 MG
50 CAPSULE ORAL
Status: DISCONTINUED | OUTPATIENT
Start: 2017-10-05 | End: 2017-10-09 | Stop reason: HOSPADM

## 2017-10-05 RX ADMIN — ONDANSETRON 4 MG: 4 TABLET, ORALLY DISINTEGRATING ORAL at 08:30

## 2017-10-05 RX ADMIN — LORAZEPAM 0.5 MG: 0.5 TABLET ORAL at 08:30

## 2017-10-05 RX ADMIN — ONDANSETRON 4 MG: 4 TABLET, ORALLY DISINTEGRATING ORAL at 18:55

## 2017-10-05 RX ADMIN — Medication 1 TABLET: at 09:16

## 2017-10-05 RX ADMIN — DIPHENHYDRAMINE HYDROCHLORIDE 50 MG: 50 CAPSULE ORAL at 22:08

## 2017-10-05 NOTE — BH NOTES
Lying quietly in bed with eyes closed , respirations even and unlabored, NAD noted   Bathroom light on for safety , Q!5 min safety rounds continue

## 2017-10-05 NOTE — BH NOTES
Charge nurse SINGH Contreras called [facility] to notify staff that the pt needs FHT's daily. OB/Gyn staff to come perform FHT's today.

## 2017-10-05 NOTE — H&P
INITIAL PSYCHIATRIC EVALUATION            IDENTIFICATION:    Patient Name  Prem Johnson   Date of Birth 1990   [facility] [id number]   Medical Record Number  [id number]      Age  32 y.o. PCP None   Admit date:  10/4/2017    Room Number  728/01  @ [facility]   Date of Service  10/5/2017            HISTORY         REASON FOR HOSPITALIZATION:  CC: \"Depression and suicidal ideations\". Pt admitted on a voluntary basis for severe depression and suicidal ideations    HISTORY OF PRESENT ILLNESS:    The patient, Prem Johnson, is a 32 y.o. WHITE [place]  female with a past psychiatric history significant for MDD, 26 weeks pregnant/ third pregnancy who presents at this time with complaints of (and/or evidence of) the following emotional symptoms:anxious,  depression, suicidal thoughts/threats and agitation. Additional symptomatology include severe marital conflict and ambivalence about her third pregnancy and her marriage. The above symptoms have been present for several weeks These symptoms are of moderate to high severity. These symptoms are high in nature. The patient's condition has been precipitated by severe life stressors including marital conflict, feeling all alone to raise her children and run the household, limited finances and psychosocial stressors. Smoking marijuana regularly. ALLERGIES: No Known Allergies   MEDICATIONS PRIOR TO ADMISSION:   Prescriptions Prior to Admission   Medication Sig    PRENATAL VIT CALC,IRON,FOLIC (PRENATAL VITAMIN PO) Take  by mouth.  doxylamine succinate (UNISOM, DOXYLAMINE,) 25 mg tablet Take 1 Tab by mouth nightly as needed for Sleep.  amoxicillin (AMOXIL) 500 mg capsule Take 2 Caps by mouth two (2) times a day.  albuterol (PROVENTIL HFA, VENTOLIN HFA) 90 mcg/actuation inhaler Take 2 Puffs by inhalation every four (4) hours as needed for Wheezing.       PAST MEDICAL HISTORY:   Past Medical History:   Diagnosis Date    Abnormal Pap smear  Routine gynecological examination     [facility]     Past Surgical History:   Procedure Laterality Date    HX GYN      HX TONSILLECTOMY        SOCIAL HISTORY: Lives with  and two oung children age 11 and 22 mo. Works cleaning houses. Social History     Social History    Marital status:      Spouse name: N/A    Number of children: N/A    Years of education: N/A     Occupational History    Not on file. Social History Main Topics    Smoking status: Current Every Day Smoker     Packs/day: 1.00     Years: 6.00     Types: Cigarettes    Smokeless tobacco: Never Used    Alcohol use 6.0 oz/week     12 Cans of beer per week      Comment: varies    Drug use: Yes     Special: Marijuana    Sexual activity: Yes     Partners: Male     Birth control/ protection: Implant     Other Topics Concern    Not on file     Social History Narrative    ** Merged History Encounter **           FAMILY HISTORY:mother- anxiety  Family History   Problem Relation Age of Onset    Hypertension Father        REVIEW OF SYSTEMS:   Gen: denies pain  Resp: denies sob  Cardiac: denies cp  GI: intermittent nausea  Psych: (+)depression, anger  Pertinent items are noted in the History of Present Illness. All other Systems reviewed and are considered negative. MENTAL STATUS EXAM & VITALS     MENTAL STATUS EXAM (MSE):    MSE FINDINGS ARE WITHIN NORMAL LIMITS (WNL) UNLESS OTHERWISE STATED BELOW. ( ALL OF THE BELOW CATEGORIES OF THE MSE HAVE BEEN REVIEWED (reviewed 10/5/2017) AND UPDATED AS DEEMED APPROPRIATE )  General Presentation age appropriate and casually dressed, cooperative   Orientation oriented to time, place and person   Vital Signs  See below (reviewed 10/5/2017); Vital Signs (BP, Pulse, & Temp) are within normal limits if not listed below.    Gait and Station Stable/steady, no ataxia   Musculoskeletal System No extrapyramidal symptoms (EPS); no abnormal muscular movements or Tardive Dyskinesia (TD); muscle strength and tone are within normal limits   Language No aphasia or dysarthria   Speech:  normal pitch and normal volume   Thought Processes logical; normal rate of thoughts; good abstract reasoning/computation   Thought Associations goal directed   Thought Content not internally preoccupied   Suicidal Ideations intention and death wishes   Homicidal Ideations none   Mood:  angry, hostile , anxious  and depressed   Affect:  anxious, depressed and hostile   Memory recent  good   Memory remote:  good   Concentration/Attention:  wnl   Fund of Knowledge wnl   Insight:  fair   Reliability fair   Judgment:  fair          VITALS:     Patient Vitals for the past 24 hrs:   Temp Pulse Resp BP SpO2   10/05/17 0808 98.6 °F (37 °C) 84 16 122/78 98 %   10/05/17 0800 97.6 °F (36.4 °C) 77 16 119/78 98 %   10/04/17 2012 98.2 °F (36.8 °C) 80 16 119/75 98 %     Wt Readings from Last 3 Encounters:   10/02/17 66.8 kg (147 lb 3.2 oz)   05/17/17 59 kg (130 lb)   03/10/17 57.2 kg (126 lb 1.7 oz)     Temp Readings from Last 3 Encounters:   10/05/17 98.6 °F (37 °C)   10/04/17 98.9 °F (37.2 °C)   10/02/17 98 °F (36.7 °C)     BP Readings from Last 3 Encounters:   10/05/17 122/78   10/04/17 94/80   10/02/17 118/70     Pulse Readings from Last 3 Encounters:   10/05/17 84   10/04/17 87   10/02/17 78            DATA     LABORATORY DATA:  Labs Reviewed   LIPID PANEL - Abnormal; Notable for the following:        Result Value    Cholesterol, total 286 (*)     Triglyceride 168 (*)     LDL, calculated 180.4 (*)     All other components within normal limits   CBC WITH AUTOMATED DIFF - Abnormal; Notable for the following:     WBC 14.1 (*)     NEUTROPHILS 78 (*)     MONOCYTES 4 (*)     ABS.  NEUTROPHILS 11.0 (*)     All other components within normal limits   URINALYSIS W/ REFLEX CULTURE   GLUCOSE, FASTING   TSH 3RD GENERATION     Admission on 10/04/2017   Component Date Value Ref Range Status    Color 10/04/2017 YELLOW/STRAW    Final    Appearance 10/04/2017 CLEAR  CLEAR   Final    Specific gravity 10/04/2017 1.015  1.003 - 1.030   Final    pH (UA) 10/04/2017 7.0  5.0 - 8.0   Final    Protein 10/04/2017 NEGATIVE   NEG mg/dL Final    Glucose 10/04/2017 NEGATIVE   NEG mg/dL Final    Ketone 10/04/2017 NEGATIVE   NEG mg/dL Final    Bilirubin 10/04/2017 NEGATIVE   NEG   Final    Blood 10/04/2017 NEGATIVE   NEG   Final    Urobilinogen 10/04/2017 1.0  0.2 - 1.0 EU/dL Final    Nitrites 10/04/2017 NEGATIVE   NEG   Final    Leukocyte Esterase 10/04/2017 NEGATIVE   NEG   Final    WBC 10/04/2017 0-4  0 - 4 /hpf Final    RBC 10/04/2017 0-5  0 - 5 /hpf Final    Epithelial cells 10/04/2017 FEW  FEW /lpf Final    Bacteria 10/04/2017 NEGATIVE   NEG /hpf Final    UA:UC IF INDICATED 10/04/2017 CULTURE NOT INDICATED BY UA RESULT  CNI   Final    Hyaline cast 10/04/2017 0-2  0 - 5 /lpf Final    Glucose 10/05/2017 81  65 - 100 MG/DL Final    TSH 10/05/2017 1.21  0.36 - 3.74 uIU/mL Final    LIPID PROFILE 10/05/2017        Final    Cholesterol, total 10/05/2017 286* <200 MG/DL Final    Triglyceride 10/05/2017 168* <150 MG/DL Final    HDL Cholesterol 10/05/2017 72  MG/DL Final    LDL, calculated 10/05/2017 180.4* 0 - 100 MG/DL Final    VLDL, calculated 10/05/2017 33.6  MG/DL Final    CHOL/HDL Ratio 10/05/2017 4.0  0 - 5.0   Final    WBC 10/05/2017 14.1* 3.6 - 11.0 K/uL Final    RBC 10/05/2017 4.33  3.80 - 5.20 M/uL Final    HGB 10/05/2017 12.6  11.5 - 16.0 g/dL Final    HCT 10/05/2017 37.4  35.0 - 47.0 % Final    MCV 10/05/2017 86.4  80.0 - 99.0 FL Final    MCH 10/05/2017 29.1  26.0 - 34.0 PG Final    MCHC 10/05/2017 33.7  30.0 - 36.5 g/dL Final    RDW 10/05/2017 14.1  11.5 - 14.5 % Final    PLATELET 06/58/5303 724  150 - 400 K/uL Final    NEUTROPHILS 10/05/2017 78* 32 - 75 % Final    LYMPHOCYTES 10/05/2017 17  12 - 49 % Final    MONOCYTES 10/05/2017 4* 5 - 13 % Final    EOSINOPHILS 10/05/2017 1  0 - 7 % Final    BASOPHILS 10/05/2017 0 0 - 1 % Final    ABS. NEUTROPHILS 10/05/2017 11.0* 1.8 - 8.0 K/UL Final    ABS. LYMPHOCYTES 10/05/2017 2.4  0.8 - 3.5 K/UL Final    ABS. MONOCYTES 10/05/2017 0.5  0.0 - 1.0 K/UL Final    ABS. EOSINOPHILS 10/05/2017 0.1  0.0 - 0.4 K/UL Final    ABS. BASOPHILS 10/05/2017 0.0  0.0 - 0.1 K/UL Final   Admission on 10/04/2017, Discharged on 10/04/2017   Component Date Value Ref Range Status    WBC 10/04/2017 14.5* 3.6 - 11.0 K/uL Final    RBC 10/04/2017 4.54  3.80 - 5.20 M/uL Final    HGB 10/04/2017 13.1  11.5 - 16.0 g/dL Final    HCT 10/04/2017 38.7  35.0 - 47.0 % Final    MCV 10/04/2017 85.2  80.0 - 99.0 FL Final    MCH 10/04/2017 28.9  26.0 - 34.0 PG Final    MCHC 10/04/2017 33.9  30.0 - 36.5 g/dL Final    RDW 10/04/2017 14.0  11.5 - 14.5 % Final    PLATELET 28/45/9659 066  150 - 400 K/uL Final    NEUTROPHILS 10/04/2017 82* 32 - 75 % Final    LYMPHOCYTES 10/04/2017 15  12 - 49 % Final    MONOCYTES 10/04/2017 3* 5 - 13 % Final    EOSINOPHILS 10/04/2017 0  0 - 7 % Final    BASOPHILS 10/04/2017 0  0 - 1 % Final    ABS. NEUTROPHILS 10/04/2017 11.8* 1.8 - 8.0 K/UL Final    ABS. LYMPHOCYTES 10/04/2017 2.1  0.8 - 3.5 K/UL Final    ABS. MONOCYTES 10/04/2017 0.5  0.0 - 1.0 K/UL Final    ABS. EOSINOPHILS 10/04/2017 0.1  0.0 - 0.4 K/UL Final    ABS.  BASOPHILS 10/04/2017 0.0  0.0 - 0.1 K/UL Final    Sodium 10/04/2017 139  136 - 145 mmol/L Final    Potassium 10/04/2017 4.1  3.5 - 5.1 mmol/L Final    Chloride 10/04/2017 108  97 - 108 mmol/L Final    CO2 10/04/2017 24  21 - 32 mmol/L Final    Anion gap 10/04/2017 7  5 - 15 mmol/L Final    Glucose 10/04/2017 83  65 - 100 mg/dL Final    BUN 10/04/2017 8  6 - 20 MG/DL Final    Creatinine 10/04/2017 0.59  0.55 - 1.02 MG/DL Final    BUN/Creatinine ratio 10/04/2017 14  12 - 20   Final    GFR est AA 10/04/2017 >60  >60 ml/min/1.73m2 Final    GFR est non-AA 10/04/2017 >60  >60 ml/min/1.73m2 Final    Calcium 10/04/2017 8.4* 8.5 - 10.1 MG/DL Final    Bilirubin, total 10/04/2017 0.3  0.2 - 1.0 MG/DL Final    ALT (SGPT) 10/04/2017 19  12 - 78 U/L Final    AST (SGOT) 10/04/2017 13* 15 - 37 U/L Final    Alk. phosphatase 10/04/2017 116  45 - 117 U/L Final    Protein, total 10/04/2017 7.2  6.4 - 8.2 g/dL Final    Albumin 10/04/2017 3.3* 3.5 - 5.0 g/dL Final    Globulin 10/04/2017 3.9  2.0 - 4.0 g/dL Final    A-G Ratio 10/04/2017 0.8* 1.1 - 2.2   Final    AMPHETAMINES 10/04/2017 NEGATIVE   NEG   Final    BARBITURATES 10/04/2017 NEGATIVE   NEG   Final    BENZODIAZEPINES 10/04/2017 NEGATIVE   NEG   Final    COCAINE 10/04/2017 NEGATIVE   NEG   Final    METHADONE 10/04/2017 NEGATIVE   NEG   Final    OPIATES 10/04/2017 NEGATIVE   NEG   Final    PCP(PHENCYCLIDINE) 10/04/2017 NEGATIVE   NEG   Final    THC (TH-CANNABINOL) 10/04/2017 POSITIVE* NEG   Final    Drug screen comment 10/04/2017 (NOTE)   Final    Salicylate level 36/77/2373 2.2* 2.8 - 20.0 MG/DL Final    Acetaminophen level 10/04/2017 <2* 10 - 30 ug/mL Final    ALCOHOL(ETHYL),SERUM 10/04/2017 <10  <10 MG/DL Final        RADIOLOGY REPORTS:  No results found for this or any previous visit. No results found.            MEDICATIONS       ALL MEDICATIONS  Current Facility-Administered Medications   Medication Dose Route Frequency    [START ON 10/6/2017] FLUoxetine (PROzac) capsule 10 mg  10 mg Oral DAILY    diphenhydrAMINE (BENADRYL) capsule 50 mg  50 mg Oral QHS PRN    ziprasidone (GEODON) 20 mg in sterile water (preservative free) 1 mL injection  20 mg IntraMUSCular BID PRN    LORazepam (ATIVAN) injection 2 mg  2 mg IntraMUSCular Q4H PRN    LORazepam (ATIVAN) tablet 0.5 mg  0.5 mg Oral Q4H PRN    magnesium hydroxide (MILK OF MAGNESIA) 400 mg/5 mL oral suspension 30 mL  30 mL Oral DAILY PRN    nicotine (NICODERM CQ) 21 mg/24 hr patch 1 Patch  1 Patch TransDERmal DAILY PRN    prenatal vit-iron fumarate-fa (PRENATAL PLUS with IRON) tablet 1 Tab  1 Tab Oral DAILY    ondansetron (ZOFRAN ODT) tablet 4 mg  4 mg Oral Q6H PRN      SCHEDULED MEDICATIONS  Current Facility-Administered Medications   Medication Dose Route Frequency    [START ON 10/6/2017] FLUoxetine (PROzac) capsule 10 mg  10 mg Oral DAILY    prenatal vit-iron fumarate-fa (PRENATAL PLUS with IRON) tablet 1 Tab  1 Tab Oral DAILY                ASSESSMENT & PLAN        The patient, Ranjith Vieira, is a 32 y.o.  female who presents at this time for treatment of the following diagnoses:  Patient C/[address] Problem List:     Major depressive disorder, recurrent episode, moderate degree ([address] (10/5/2017)    Assessment: moderate to severe depression with SI    Plan: Agree with inpatient hospitalization for further stabilization, safety monitoring and medication management  Medications- start Prozac 10mg, monitor for nausea  Monitor mood  Transfer to general  Provided supportive psychotherapy    Second Trimester Pregnancy  Assessment: uncomplicated, followed by OB outpatient; unwanted but planning to keep  Plan: FHT, PNV  Reviewed use of antidepressants in pregnancy including low risk of discontinuation syndrome, pphtn and the risks associated with untreated depression. A coordinated, multidisplinary treatment team (includes the nurse, unit pharmcist,  and writer) round was conducted for this initial evaluation with the patient present. The following regarding medications was addressed during rounds with patient:   the risks and benefits of the proposed medication. The patient was given the opportunity to ask questions. Informed consent given to the use of the above medications. I will continue to adjust psychiatric and non-psychiatric medications (see above \"medication\" section and orders section for details) as deemed appropriate & based upon diagnoses and response to treatment. I have reviewed admission (and previous/old) labs and medical tests in the EHR and or transferring hospital documents.  I will continue to order blood tests/labs and diagnostic tests as deemed appropriate and review results as they become available (see orders for details). I have reviewed old psychiatric and medical records available in the EHR. I Will order additional psychiatric records from other institutions to further elucidate the nature of patient's psychopathology and review once available. I will gather additional collateral information from friends, family and o/p treatment team to further elucidate the nature of patient's psychopathology and baselline level of psychiatric functioning.       ESTIMATED LENGTH OF STAY:    3-5       STRENGTHS:  Access to housing/residential stability and Interpersonal/supportive relationships (family, friends, peers)                                        SIGNED:    Artemio Lu MD  10/5/2017

## 2017-10-05 NOTE — BH NOTES
GROUP THERAPY PROGRESS NOTE    Meg Valentin participated in the Acute Unit's afternoon Process Group, with a focus on identifying feelings, planning for the rest of the day, and preparing for discharge. Group time: 35 minutes. Personal goal for participation: To increase the capacity to improve ones mood and structure. Goal orientation: The patient will be able to identify their feelings, develop a plan for structuring their day, and discharge planning. Group therapy participation: With prompting, this patient actively participated in the group. Therapeutic interventions reviewed and discussed: The group members were asked to introduce themselves to each other and to see if they could identify an emotion they are having and/or let the group know what they want to focus on for the day as they continue to make discharge plans. Impression of participation: The patient said was \"feeling better than yesterday. ..very depressed, with a lot of anxiety. \" She said she did not feel supported by her  and is now pregnant with their third child (two older are: 3year old girl and 11year old boy). She also indicated that the marriage is 11years old. She said she is disappointed that her  did not find a new home for them like he promised and that he did not want in to one the patient has found, she said \"because he would have had to work an additional part-time job along with his regular work to swing it. \" She also acknowledged being off her antidepressant due to her pregnancy but is willing to reconsider with the attending's input. She works at her own business, cleaning houses, while handling most of the house and children responsibilities, according to her perspective. She expressed no current SI/HI and displayed no overt psychotic symptoms in this group. She was alert and appeared fully oriented.  She added that she hoped she might be transferred over to the General Unit and she was encouraged to check that out with her attending psychiatrist, Dr. Anupama Peterson. This was the patient's first process group with the undersigned in this hospitalization.

## 2017-10-05 NOTE — BH NOTES
PRN Medication Documentation-[id number]    Specific patient behavior that led to need for PRN medication: c/o anxiety and insomnia  Staff interventions attempted prior to PRN being given: encouragement provided, coping skills encouraged, lights dim in milieu  PRN medication given: ativan 0.5mg po prn and ambien 5mg po prn as ordered  Patient response/effectiveness of PRN medication: continue to monitor. PRN Medication Documentation 2152    Specific patient behavior that led to need for PRN medication: c/o nausea  Staff interventions attempted prior to PRN being given: pt encouraged to deep breathe and request ginger-avery if needed  PRN medication given: zofran 4mg po prn as ordered  Patient response/effectiveness of PRN medication: no emisis noted. Prn effective.

## 2017-10-05 NOTE — BH NOTES
GROUP THERAPY PROGRESS NOTE    Miky Fisher is participating in [place].      Group time: 30 minutes    Personal goal for participation: daily progress    Goal orientation: personal    Group therapy participation: active    Therapeutic interventions reviewed and discussed:     Impression of participation: The patient was an asset to the group

## 2017-10-05 NOTE — BH NOTES
PSYCHOSOCIAL ASSESSMENT  :Patient identifying info:  Allen Carpenter is a 32 y.o., female admitted 10/4/2017  1:22 PM     Presenting problem and precipitating factors: Patient was admitted as a voluntary patient due to suicidal ideations. She has been off her antidepressant due to morning sickness. She reports marital stress and  is not supportive     Mental status assessment:She is alert , oriented to all spheres , very tearful and angry. She states she has been anxious and depressed    Current psychiatric providers and contact info: no current provider     Previous psychiatric services/providers and response to treatment: - no current provider , she was hospitalized at age 15 at [address] [place] [address] for suicidal ideations    Family history of mental illness :she reports her mother suffers from depression     Substance abuse history:    Social History   Substance Use Topics    Smoking status: Current Every Day Smoker     Packs/day: 1.00     Years: 6.00     Types: Cigarettes    Smokeless tobacco: Never Used    Alcohol use 6.0 oz/week     12 Cans of beer per week      Comment: varies       Family constellation:  , one son 11years old, 3year old daughter and is now 29 weeks pregnant     Is significant other involved?        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

## 2017-10-05 NOTE — PROGRESS NOTES
Problem: Depressed Mood (Adult/Pediatric)  Goal: *STG: Verbalizes anger, guilt, and other feelings in a constructive manor  Outcome: Progressing Towards Goal  Was able to verbalize anger and frustration related to\" my `s  lack of help with childcare and help around the house. Thats why I feel so much stress. \"  Goal: *STG: Demonstrates reduction in symptoms and increase in insight into coping skills/future focused  Outcome: Progressing Towards Goal  Less tearful but mood appears to be depressed. Appears to have difficulty with offering alternative coping mechanism related to family stress issue. Problem: Chemical Dependency (Adult/Pediatric)  Goal: *STG: Will identify negative impact of chemical dependency including the use of tobacco, alcohol, and other substances  Outcome: Progressing Towards Goal  Stated \"maybe I do smoke too much weed . Its how I cope. \"

## 2017-10-05 NOTE — INTERDISCIPLINARY ROUNDS
Behavioral Health Interdisciplinary Rounds     Patient Name: Elle Wood  Age: 32 y.o. Room/Bed:  728/  Primary Diagnosis: <principal problem not specified>   Admission Status: Voluntary     Readmission within 30 days: no  Power of  in place: no  Patient requires a blocked bed: no          Reason for blocked bed:     VTE Prophylaxis: No  Flu vaccine given :  Mobility needs/Fall risk: no    Nutritional Plan: yes  Consults: H/P done , Daily FHT - L&D         Labs/Testing due today?: yes , cooperative w/ lab draw     Sleep hours: 6 1/4 hours       Participation in Care/Groups:  New   Medication Compliant?: Yes  PRNS (last 24 hours): Antianxiety and Sleep Aid    Restraints (last 24 hours):  no  Substance Abuse:  no  CIWA (range last 24 hours):  COWS (range last 24 hours):   Alcohol screening (AUDIT) completed -  AUDIT Score: 1  If applicable, date SBIRT discussed in treatment team AND documented:   Tobacco - patient is a smoker:     Yes          Date tobacco education completed by RN: , Does not want to stop   24 hour chart check complete: Yes     Patient goal(s) for today:   Treatment team focus/goals: Plan to start Prozac   Progress note She has been tearful and sad today. She is willing to start medications. Possible transfer to the general unit. LOS:  1  Expected LOS: TBD     Financial concerns/prescription coverage:    Date of last family contact:      Family requesting physician contact today:    Discharge plan:  She will return  home when ready for discharge   Guns in the home:no        Outpatient provider(s): TBD   Participating treatment team members: Adriana Odom Dr. RN

## 2017-10-05 NOTE — BH NOTES
PRN Medication Documentation    Specific patient behavior that led to need for PRN medication: anxiety  Staff interventions attempted prior to PRN being given: coping skills  PRN medication given: Ativan po  0910- Patient response/effectiveness of PRN medication: effective    PRN Medication Documentation    Specific patient behavior that led to need for PRN medication: Nausea  Staff interventions attempted prior to PRN being given: gatorade  PRN medication given: Zofran po  0910- Patient response/effectiveness of PRN medication: relief

## 2017-10-06 PROCEDURE — 74011250637 HC RX REV CODE- 250/637: Performed by: HOSPITALIST

## 2017-10-06 PROCEDURE — 74011250637 HC RX REV CODE- 250/637: Performed by: PSYCHIATRY & NEUROLOGY

## 2017-10-06 PROCEDURE — 65220000003 HC RM SEMIPRIVATE PSYCH

## 2017-10-06 RX ORDER — FLUOXETINE HYDROCHLORIDE 20 MG/1
20 CAPSULE ORAL DAILY
Status: DISCONTINUED | OUTPATIENT
Start: 2017-10-07 | End: 2017-10-09 | Stop reason: HOSPADM

## 2017-10-06 RX ADMIN — DIPHENHYDRAMINE HYDROCHLORIDE 50 MG: 50 CAPSULE ORAL at 21:45

## 2017-10-06 RX ADMIN — MAGNESIUM HYDROXIDE 30 ML: 400 SUSPENSION ORAL at 21:46

## 2017-10-06 RX ADMIN — FLUOXETINE 10 MG: 10 CAPSULE ORAL at 08:57

## 2017-10-06 RX ADMIN — ONDANSETRON 4 MG: 4 TABLET, ORALLY DISINTEGRATING ORAL at 08:45

## 2017-10-06 RX ADMIN — Medication 1 TABLET: at 08:57

## 2017-10-06 NOTE — INTERDISCIPLINARY ROUNDS
Behavioral Health Interdisciplinary Rounds     Patient Name: Elle Wood  Age: 32 y.o. Room/Bed:  731/  Primary Diagnosis: Major depressive disorder, recurrent episode, moderate degree (HCC)   Admission Status: Voluntary     Readmission within 30 days: no  Power of  in place: no  Patient requires a blocked bed: no          Reason for blocked bed: n/a    VTE Prophylaxis: Not indicated  Flu vaccine given : no   Mobility needs/Fall risk: no    Nutritional Plan: yes  Consults: Daily FHT's         Labs/Testing due today?: no    Sleep hours: 6:30       Participation in Care/Groups:  yes  Medication Compliant?: Yes  PRNS (last 24 hours): Antianxiety, Sleep Aid, Antinausea, Nicotine Patch    Restraints (last 24 hours):  no  Substance Abuse:  no  CIWA (range last 24 hours):  COWS (range last 24 hours):   Alcohol screening (AUDIT) completed -  AUDIT Score: 1  If applicable, date SBIRT discussed in treatment team AND documented: n/a  Tobacco - patient is a smoker: yes   Date tobacco education completed by RN: 10/4/17  24 hour chart check complete: yes     Patient goal(s) for today: Attend all groups  Treatment team focus/goals: Provide worksheets  Progress note: Patient asking for Ativan; Pt states she has difficulty in the mornings. LOS:  2  Expected LOS: TBD    Financial concerns/prescription coverage: [employer] Medicaid   Date of last family contact: None      Family requesting physician contact today: No  Discharge plan: Return home when stable for discharge  Guns in the home: No       Outpatient provider(s):  To be linked    Participating treatment team members: Alycia Odom MSW; Dr. Kymberly Delgadillo MD; Darryle Blood, RN; [facility] medicine resident

## 2017-10-06 NOTE — BH NOTES
TRANSFER - OUT REPORT:    Verbal report given to Mary Davidson on [place]y [place]  being transferred to [facility] for routine progression of care       Report consisted of patients Situation, Background, Assessment and   Recommendations(SBAR). Information from the following report(s) SBAR was reviewed with the receiving nurse. Opportunity for questions and clarification was provided.       Patient transported with:   Registered Nurse

## 2017-10-06 NOTE — BH NOTES
GROUP THERAPY PROGRESS NOTE    Luis Atwood participated in the General Unit's Process Group, with a focus identifying feelings and planning for the day. Group time: 65 minutes. Personal goal for participation: To increase the capacity to improve ones mood and structure. Goal orientation: The patient will be able to identify their feelings, develop a plan for structuring their day, and discharge planning. Group therapy participation: With prompting, this patient partially participated in the group. Therapeutic interventions reviewed and discussed: The group members were asked to introduce themselves to each other and to see if they could identify an emotion they are having and/or let the group know what they want to focus on for the day as they continue to make discharge plans. Impression of participation: The patient said she was feeling \"sad\" and she began to cry in group. She was called out to meet with her treatment team and attending psychiatrist and when she returned she indicated that she was not happy, \"because my doctor wants to put me back on Prozac and I don't want to wait two weeks to see whether it works. \" She expressed no SI/HI and displayed no overt psychotic symptoms. She was encouraged to speak up to her doctor about how she felt the next time she meets. She withdrew from the group emotionally and by the last five minutes of group had begun to work on a puzzle with a peer. Her affect remains depressed and anxious and her mood matched her affect. She is struggling to regain a sense of control in her life and overcome a low frustration tolerance.

## 2017-10-06 NOTE — PROGRESS NOTES
Problem: Depressed Mood (Adult/Pediatric)  Goal: *STG: Participates in treatment plan  Outcome: Progressing Towards Goal  Review meds, out on unit social w peers and staff. Mood and affect brighter and decrease in tearfulness. Pt daily goal is to complete tx team homework  Goal: *STG: Verbalizes anger, guilt, and other feelings in a constructive manor  Outcome: Progressing Towards Goal  Describes feelings of shame related to her having an affair, anger at self for \"being selfish like my Mom was to my stepdad\" comparing her marriage to her mothers and states her mother had multiple affairs leading to her stepdad leaving her mother. Feeling inadequate when  brings up her faults and mistakes in their marriage  Goal: *STG: Demonstrates reduction in symptoms and increase in insight into coping skills/future focused  Outcome: Progressing Towards Goal  Denies SI, no self harming behaviors noted. Symptoms such as anxiety has decreased, sadness or hopelessness decrease and able to recognize feelings of gratitude. Insight into her barriers to using healthy coping skills such as low motivation and negative thinking. Able to recognize her chronic generalizing, basing actions on assumptions. Voices and demonstrates motivation when working w tx team to create a healthy habit of writing a gratitude list daily in the morning.    Goal: Interventions  Outcome: Progressing Towards Goal  Focus is on coping skills encourage positive thinking

## 2017-10-06 NOTE — BH NOTES
PRN Medication Documentation    Specific patient behavior that led to need for PRN medication: nausea  Staff interventions attempted prior to PRN being given: fluids  PRN medication given: zofran 4 mg po PRN  Patient response/effectiveness of PRN medication: 10:00 am-no complaints of nausea at this time.

## 2017-10-06 NOTE — BH NOTES
Received pt on unit. No voiced complaints or acute distress at present. Pleasant and cooperative.   Pt states\" I FINISHED [address] ASSIGNMENT\"   aware

## 2017-10-06 NOTE — BH NOTES
GROUP THERAPY PROGRESS NOTE    Allen Carpenter attended a Coping Skills group therapy session on the topic of \"Supports and Obstacles in [facility]. \"     Group time: 70 minutes. Personal goal for participation: To identify the primary trouble in ones life, to consider the smaller obstacles to solving or working with this problem, and to define what might provide protection in the midst of trouble. Goal orientation: The patient will be able to identify and share their self-defined primary problem in their life and to think about what might ameliorate their distress. Group therapy participation: This patient partially participated in the therapy session. Therapeutic interventions reviewed and discussed: The group members were asked to take a few minutes and identify the following elements in their life on a worksheet provided:   1) What is the primary trouble as a storm cloud that hangs over their life? Can they describe how they see or what they think is in this storm cloud? 2) Draw or write three smaller obstacles personified as rain drops from the storm cloud? 3) What zaps, like a lightning strike, your ability to hold up your umbrella during the storm? 4) What protects you, like an umbrella, from some of the small obstacles? 5) Who holds or helps hold up your umbrella? Impression of participation: The patient spent the allotted time working on her sheet and offered the following. She said \"depression\" was her primary problem. To the sense of depression, she added, \"feeling overwhelmed\" and \"living in too small a house. \" Her smaller problems were identified as her \"emotions, things in the past, and feeling like my  and family don't understand what I'm going through. ..only my grandmother does. \" She concluded by suggesting that \"better communication with my \" would help her deal with her problems as described above.  She expressed no current SI/HI and displayed no overt psychotic symptoms in group. Her affect was anxious and depressed and her mood was cooperative and somewhat superficial. She got up from a seat and left the room about skilled nursing through the group and may have been focused on something other than this exercise.

## 2017-10-06 NOTE — BH NOTES
TRANSFER - IN REPORT:    Verbal report received from [address] RN(name) on Joe Leal  being received from [address] [place], [place] [address]unit) for routine progression of care      Report consisted of patients Situation, Background, Assessment and   Recommendations(SBAR). Information from the following report(s) SBAR, ED Summary, MAR, Accordion and Recent Results was reviewed with the receiving nurse. Opportunity for questions and clarification was provided. Assessment completed upon patients arrival to unit and care assumed. L and D contacted, notified of fetal heart rate order. Pt is anxious, cooperative, appropriate.   Denies SI.

## 2017-10-06 NOTE — BH NOTES
PSYCHIATRIC PROGRESS NOTE         Patient Name  Mary Beth Barrera   Date of Birth 1990   CSN [id number]   Medical Record Number  [id number]      Age  32 y.o. PCP None   Admit date:  10/4/2017    Room Number  731/01  @ [facility]   Date of Service  10/6/2017          PSYCHOTHERAPY SESSION NOTE:  Length of psychotherapy session: 20 minutes    Main condition/diagnosis/issues treated during session today, 10/6/2017 : Depressed, Anxious, overwhelmed    I employed Cognitive Behavioral therapy techniques, Reality-Oriented psychotherapy, as well as supportive psychotherapy in regards to various ongoing psychosocial stressors, including the following: pre-admission and current problems; housing issues; academic issues;medical issues; and stress of hospitalization. Interpersonal relationship issues and psychodynamic conflicts explored. Attempts made to alleviate maladaptive patterns. We, also, worked on issues of denial & effects of substance dependency/use     Overall, patient is not progressing    Treatment Plan Update (reviewed an updated 10/6/2017) : I will modify psychotherapy tx plan by implementing more stress management strategies, building upon cognitive behavioral techniques, increasing coping skills, as well as shoring up psychological defenses). An extended energy and skill set was needed to engage pt in psychotherapy due to some of the following: resistiveness, complexity, negativity, confrontational nature, hostile behaviors, and/or severe abnormalities in thought processes/psychosis resulting in the loss of expressive/receptive language communication skills. E & M PROGRESS NOTE:         HISTORY       CC:  \"The mornings are still pretty tough\"  HISTORY OF PRESENT ILLNESS/INTERVAL HISTORY:  (reviewed/updated 10/6/2017). per initial evaluation:   The patient, Mary Beth Barrera, is a 32 y.o.   WHITE [place]  female with a past psychiatric history significant for MDD, 26 weeks pregnant/ third pregnancy who presents at this time with complaints of (and/or evidence of) the following emotional symptoms:anxious,  depression, suicidal thoughts/threats and agitation. Additional symptomatology include severe marital conflict and ambivalence about her third pregnancy and her marriage. The above symptoms have been present for several weeks These symptoms are of moderate to high severity. These symptoms are high in nature. The patient's condition has been precipitated by severe life stressors including marital conflict, feeling all alone to raise her children and run the household, limited finances and psychosocial stressors. Smoking marijuana regularly. Travis Ochoa presents/reports/evidences the following emotional symptoms today, 10/6/2017:depression and anxiety. The above symptoms have been present for several weeks. These symptoms are of moderate to high severity. The symptoms are constant in nature. The patient requested ativan as an outpatient medication, \"just something to help me get through the mornings. \". She showed little interest in working on coping skills, thought records, problem solving, etc.       SIDE EFFECTS: (reviewed/updated 10/6/2017)  None reported or admitted to. ALLERGIES:(reviewed/updated 10/6/2017)  No Known Allergies   MEDICATIONS PRIOR TO ADMISSION:(reviewed/updated 10/6/2017)  Prescriptions Prior to Admission   Medication Sig    PRENATAL VIT CALC,IRON,FOLIC (PRENATAL VITAMIN PO) Take  by mouth.  doxylamine succinate (UNISOM, DOXYLAMINE,) 25 mg tablet Take 1 Tab by mouth nightly as needed for Sleep.  amoxicillin (AMOXIL) 500 mg capsule Take 2 Caps by mouth two (2) times a day.  albuterol (PROVENTIL HFA, VENTOLIN HFA) 90 mcg/actuation inhaler Take 2 Puffs by inhalation every four (4) hours as needed for Wheezing.       PAST MEDICAL HISTORY: Past medical history from the initial psychiatric evaluation has been reviewed (reviewed/updated 10/6/2017) with no additional updates (I asked patient and no additional past medical history provided). Past Medical History:   Diagnosis Date    Abnormal Pap smear     Routine gynecological examination     [facility]     Past Surgical History:   Procedure Laterality Date    HX GYN      HX TONSILLECTOMY        SOCIAL HISTORY: Social history from the initial psychiatric evaluation has been reviewed (reviewed/updated 10/6/2017) with no additional updates (I asked patient and no additional social history provided). Social History     Social History    Marital status:      Spouse name: N/A    Number of children: N/A    Years of education: N/A     Occupational History    Not on file. Social History Main Topics    Smoking status: Current Every Day Smoker     Packs/day: 1.00     Years: 6.00     Types: Cigarettes    Smokeless tobacco: Never Used    Alcohol use 6.0 oz/week     12 Cans of beer per week      Comment: varies    Drug use: Yes     Special: Marijuana    Sexual activity: Yes     Partners: Male     Birth control/ protection: Implant     Other Topics Concern    Not on file     Social History Narrative    ** Merged History Encounter **           FAMILY HISTORY: Family history from the initial psychiatric evaluation has been reviewed (reviewed/updated 10/6/2017) with no additional updates (I asked patient and no additional family history provided).  Family History   Problem Relation Age of Onset    Hypertension Father        REVIEW OF SYSTEMS: (reviewed/updated 10/6/2017)  Appetite:good   Sleep: good   All other Review of Systems: intermittent nausea         [facility] (MSE):    MSE FINDINGS ARE WITHIN NORMAL LIMITS (WNL) UNLESS OTHERWISE STATED BELOW. ( ALL OF THE BELOW CATEGORIES OF THE MSE HAVE BEEN REVIEWED (reviewed 10/6/2017) AND UPDATED AS DEEMED APPROPRIATE )  General Presentation age appropriate and casually dressed, cooperative   Orientation oriented to time, place and person   Vital Signs  See below (reviewed 10/6/2017); Vital Signs (BP, Pulse, & Temp) are within normal limits if not listed below. Gait and Station Stable/steady, no ataxia   Musculoskeletal System No extrapyramidal symptoms (EPS); no abnormal muscular movements or Tardive Dyskinesia (TD); muscle strength and tone are within normal limits   Language No aphasia or dysarthria   Speech:  normal pitch and normal volume   Thought Processes logical; normal rate of thoughts; good abstract reasoning/computation   Thought Associations goal directed   Thought Content not internally preoccupied   Suicidal Ideations none   Homicidal Ideations none   Mood:  anxious  and depressed   Affect:  anxious and depressed   Memory recent  good   Memory remote:  good   Concentration/Attention:  wnl   Fund of Knowledge wnl   Insight:  limited   Reliability fair   Judgment:  limited          VITALS:     Patient Vitals for the past 24 hrs:   Temp Pulse Resp BP SpO2   10/06/17 0737 98 °F (36.7 °C) 84 16 126/80 -   10/05/17 2006 97.7 °F (36.5 °C) 79 20 135/79 -   10/05/17 1535 98.9 °F (37.2 °C) 91 18 123/79 99 %     Wt Readings from Last 3 Encounters:   10/02/17 66.8 kg (147 lb 3.2 oz)   05/17/17 59 kg (130 lb)   03/10/17 57.2 kg (126 lb 1.7 oz)     Temp Readings from Last 3 Encounters:   10/06/17 98 °F (36.7 °C)   10/04/17 98.9 °F (37.2 °C)   10/02/17 98 °F (36.7 °C)     BP Readings from Last 3 Encounters:   10/06/17 126/80   10/04/17 94/80   10/02/17 118/70     Pulse Readings from Last 3 Encounters:   10/06/17 84   10/04/17 87   10/02/17 78            DATA     LABORATORY DATA:(reviewed/updated 10/6/2017)  No results found for this or any previous visit (from the past 24 hour(s)). No results found for: VALF2, VALAC, VALP, VALPR, DS6, CRBAM, CRBAMP, CARB2, XCRBAM  No results found for: LITHM   RADIOLOGY REPORTS:(reviewed/updated 10/6/2017)  No results found.        MEDICATIONS ALL MEDICATIONS:   Current Facility-Administered Medications   Medication Dose Route Frequency    FLUoxetine (PROzac) capsule 10 mg  10 mg Oral DAILY    diphenhydrAMINE (BENADRYL) capsule 50 mg  50 mg Oral QHS PRN    albuterol (PROVENTIL VENTOLIN) nebulizer solution 2.5 mg  2.5 mg Nebulization Q4H PRN    ziprasidone (GEODON) 20 mg in sterile water (preservative free) 1 mL injection  20 mg IntraMUSCular BID PRN    LORazepam (ATIVAN) injection 2 mg  2 mg IntraMUSCular Q4H PRN    LORazepam (ATIVAN) tablet 0.5 mg  0.5 mg Oral Q4H PRN    magnesium hydroxide (MILK OF MAGNESIA) 400 mg/5 mL oral suspension 30 mL  30 mL Oral DAILY PRN    nicotine (NICODERM CQ) 21 mg/24 hr patch 1 Patch  1 Patch TransDERmal DAILY PRN    prenatal vit-iron fumarate-fa (PRENATAL PLUS with IRON) tablet 1 Tab  1 Tab Oral DAILY    ondansetron (ZOFRAN ODT) tablet 4 mg  4 mg Oral Q6H PRN      SCHEDULED MEDICATIONS:   Current Facility-Administered Medications   Medication Dose Route Frequency    FLUoxetine (PROzac) capsule 10 mg  10 mg Oral DAILY    prenatal vit-iron fumarate-fa (PRENATAL PLUS with IRON) tablet 1 Tab  1 Tab Oral DAILY          ASSESSMENT & PLAN     DIAGNOSES REQUIRING ACTIVE TREATMENT AND MONITORING: (reviewed/updated 10/6/2017)  Patient Active Hospital Problem List:   Major depressive disorder, recurrent episode, moderate degree ([facility] [address] (10/5/2017)    Assessment: complicated by marital stressors, financial stressors, cannabis abuse and comorbid anxiety    Plan: Continued inpatient hospitalization for further stabilization, safety monitoring and med. mngmt  Provided supportive psychotherapy  Continue Prozac, increase to 20mg.    Start hydrozyzine 25mg prn for anxiety    Pregnant, 2nd trimester  Assessment:uncomplicated  Plan: continued FHT, pnv        I will continue to monitor blood levels (Depakote, Tegretol, lithium, clozapine---a drug with a narrow therapeutic index= NTI) and associated labs for drug therapy implemented that require intense monitoring for toxicity as deemed appropriate based on current medication side effects and pharmacodynamically determined drug 1/2 lives. In summary, Drew Gloria, is a 32 y.o.  female who presents with a severe exacerbation of the principal diagnosis of Major depressive disorder, recurrent episode, moderate degree ([address]  Patient's condition is improving. Patient requires continued inpatient hospitalization for further stabilization, safety monitoring and medication management. I will continue to coordinate the provision of individual, milieu, occupational, group, and substance abuse therapies to address target symptoms/diagnoses as deemed appropriate for the individual patient. A coordinated, multidisplinary treatment team round was conducted with the patient (this team consists of the nurse, psychiatric unit pharmcist,  and writer). Complete current electronic health record for patient has been reviewed today including consultant notes, ancillary staff notes, nurses and psychiatric tech notes. Suicide risk assessment completed and patient deemed to be of low risk for suicide at this time. The following regarding medications was addressed during rounds with patient:   the risks and benefits of the proposed medication. The patient was given the opportunity to ask questions. Informed consent given to the use of the above medications. Will continue to adjust psychiatric and non-psychiatric medications (see above \"medication\" section and orders section for details) as deemed appropriate & based upon diagnoses and response to treatment. I will continue to order blood tests/labs and diagnostic tests as deemed appropriate and review results as they become available (see orders for details and above listed lab/test results).     I will order psychiatric records from previous [facility] hospitals to further elucidate the nature of patient's psychopathology and review once available. I will gather additional collateral information from friends, family and o/p treatment team to further elucidate the nature of patient's psychopathology and baselline level of psychiatric functioning. I certify that this patient's inpatient psychiatric hospital services furnished since the previous certification were, and continue to be, required for treatment that could reasonably be expected to improve the patient's condition, or for diagnostic study, and that the patient continues to need, on a daily basis, active treatment furnished directly by or requiring the supervision of inpatient psychiatric facility personnel. In addition, the hospital records show that services furnished were intensive treatment services, admission or related services, or equivalent services.     EXPECTED DISCHARGE DATE/DAY: TBD     DISPOSITION: Home       Signed By:   Maris Krabbe, MD  10/6/2017

## 2017-10-06 NOTE — BH NOTES
Pt currently resting comfortably in bed. Respirations even and unlabored. NAD. Continue to monitor via 15 minute observation. 24 hour chart review completed.

## 2017-10-07 PROCEDURE — 74011250637 HC RX REV CODE- 250/637: Performed by: PSYCHIATRY & NEUROLOGY

## 2017-10-07 PROCEDURE — 74011250637 HC RX REV CODE- 250/637: Performed by: HOSPITALIST

## 2017-10-07 PROCEDURE — 65220000003 HC RM SEMIPRIVATE PSYCH

## 2017-10-07 RX ADMIN — ONDANSETRON 4 MG: 4 TABLET, ORALLY DISINTEGRATING ORAL at 09:23

## 2017-10-07 RX ADMIN — MAGNESIUM HYDROXIDE 30 ML: 400 SUSPENSION ORAL at 09:23

## 2017-10-07 RX ADMIN — FLUOXETINE HYDROCHLORIDE 20 MG: 20 CAPSULE ORAL at 09:21

## 2017-10-07 RX ADMIN — Medication 1 TABLET: at 09:21

## 2017-10-07 RX ADMIN — DIPHENHYDRAMINE HYDROCHLORIDE 50 MG: 50 CAPSULE ORAL at 22:23

## 2017-10-07 NOTE — PROGRESS NOTES
GROUP THERAPY PROGRESS NOTE    Becca Paiz is participating in [facility].      Group time: 15 minutes    Personal goal for participation: n/a    Goal orientation: community    Group therapy participation: active    Therapeutic interventions reviewed and discussed:  discussed unit rules and schedule and discussed treatment team    Impression of participation: actively listened, asked questions, engaged in conversation

## 2017-10-07 NOTE — BH NOTES
GROUP THERAPY PROGRESS NOTE    Salena Beaver is participating in community group. Group time: 20 minutes    Personal goal: For  and her to communicate opening, more often, and more like a family.      Goal orientation: community    Group therapy participation: active    Therapeutic interventions reviewed and discussed: discussed unit rules, goals, and positive traits of self    Impression of participation: active, engaged in conversation

## 2017-10-07 NOTE — BH NOTES
PSYCHIATRIC PROGRESS NOTE         Patient Name  Shaina Andres   Date of Birth 1990   [facility] [id number]   Medical Record Number  [id number]      Age  32 y.o. PCP None   Admit date:  10/4/2017    Room Number  731/01  @ [facility]   Date of Service  10/7/2017          PSYCHOTHERAPY SESSION NOTE:  Length of psychotherapy session: 20 minutes    Main condition/diagnosis/issues treated during session today, 10/7/2017 : Depressed, Anxious, overwhelmed    I employed Cognitive Behavioral therapy techniques, Reality-Oriented psychotherapy, as well as supportive psychotherapy in regards to various ongoing psychosocial stressors, including the following: pre-admission and current problems; housing issues; academic issues;medical issues; and stress of hospitalization. Interpersonal relationship issues and psychodynamic conflicts explored. Attempts made to alleviate maladaptive patterns. We, also, worked on issues of denial & effects of substance dependency/use     Overall, patient is not progressing    Treatment Plan Update (reviewed an updated 10/7/2017) : I will modify psychotherapy tx plan by implementing more stress management strategies, building upon cognitive behavioral techniques, increasing coping skills, as well as shoring up psychological defenses). An extended energy and skill set was needed to engage pt in psychotherapy due to some of the following: resistiveness, complexity, negativity, confrontational nature, hostile behaviors, and/or severe abnormalities in thought processes/psychosis resulting in the loss of expressive/receptive language communication skills. E & M PROGRESS NOTE:         HISTORY       CC:  \"The mornings are still pretty tough\"  HISTORY OF PRESENT ILLNESS/INTERVAL HISTORY:  (reviewed/updated 10/7/2017). per initial evaluation:   The patient, Shaina Andres, is a 32 y.o.   WHITE [place]  female with a past psychiatric history significant for MDD, 26 weeks pregnant/ third pregnancy who presents at this time with complaints of (and/or evidence of) the following emotional symptoms:anxious,  depression, suicidal thoughts/threats and agitation. Additional symptomatology include severe marital conflict and ambivalence about her third pregnancy and her marriage. The above symptoms have been present for several weeks These symptoms are of moderate to high severity. These symptoms are high in nature. The patient's condition has been precipitated by severe life stressors including marital conflict, feeling all alone to raise her children and run the household, limited finances and psychosocial stressors. Smoking marijuana regularly. Kay Gregory presents/reports/evidences the following emotional symptoms today, 10/7/2017:depression and anxiety. The above symptoms have been present for several weeks. These symptoms are of moderate to high severity. The symptoms are constant in nature. The patient requested ativan as an outpatient medication, \"just something to help me get through the mornings. \". She showed little interest in working on coping skills, thought records, problem solving, etc.   10/07/17 she is feeling better today and no anger issues and no self harm  Behavior and no aggressive behavior  And no acting out behavior        SIDE EFFECTS: (reviewed/updated 10/7/2017)  None reported or admitted to. ALLERGIES:(reviewed/updated 10/7/2017)  No Known Allergies   MEDICATIONS PRIOR TO ADMISSION:(reviewed/updated 10/7/2017)  Prescriptions Prior to Admission   Medication Sig    PRENATAL VIT CALC,IRON,FOLIC (PRENATAL VITAMIN PO) Take  by mouth.  doxylamine succinate (UNISOM, DOXYLAMINE,) 25 mg tablet Take 1 Tab by mouth nightly as needed for Sleep.  amoxicillin (AMOXIL) 500 mg capsule Take 2 Caps by mouth two (2) times a day.     albuterol (PROVENTIL HFA, VENTOLIN HFA) 90 mcg/actuation inhaler Take 2 Puffs by inhalation every four (4) hours as needed for Wheezing. PAST MEDICAL HISTORY: Past medical history from the initial psychiatric evaluation has been reviewed (reviewed/updated 10/7/2017) with no additional updates (I asked patient and no additional past medical history provided). Past Medical History:   Diagnosis Date    Abnormal Pap smear     Routine gynecological examination     [facility]     Past Surgical History:   Procedure Laterality Date    HX GYN      HX TONSILLECTOMY        SOCIAL HISTORY: Social history from the initial psychiatric evaluation has been reviewed (reviewed/updated 10/7/2017) with no additional updates (I asked patient and no additional social history provided). Social History     Social History    Marital status:      Spouse name: N/A    Number of children: N/A    Years of education: N/A     Occupational History    Not on file. Social History Main Topics    Smoking status: Current Every Day Smoker     Packs/day: 1.00     Years: 6.00     Types: Cigarettes    Smokeless tobacco: Never Used    Alcohol use 6.0 oz/week     12 Cans of beer per week      Comment: varies    Drug use: Yes     Special: Marijuana    Sexual activity: Yes     Partners: Male     Birth control/ protection: Implant     Other Topics Concern    Not on file     Social History Narrative    ** Merged History Encounter **           FAMILY HISTORY: Family history from the initial psychiatric evaluation has been reviewed (reviewed/updated 10/7/2017) with no additional updates (I asked patient and no additional family history provided).    Family History   Problem Relation Age of Onset    Hypertension Father        REVIEW OF SYSTEMS: (reviewed/updated 10/7/2017)  Appetite:good   Sleep: good   All other Review of Systems: intermittent nausea         [facility] (MSE):    [facility] FINDINGS ARE WITHIN NORMAL LIMITS (WNL) UNLESS OTHERWISE STATED BELOW. ( ALL OF THE BELOW CATEGORIES OF THE MSE HAVE BEEN REVIEWED (reviewed 10/7/2017) AND UPDATED AS DEEMED APPROPRIATE )  General Presentation age appropriate and casually dressed, cooperative   Orientation oriented to time, place and person   Vital Signs  See below (reviewed 10/7/2017); Vital Signs (BP, Pulse, & Temp) are within normal limits if not listed below.    Gait and Station Stable/steady, no ataxia   Musculoskeletal System No extrapyramidal symptoms (EPS); no abnormal muscular movements or Tardive Dyskinesia (TD); muscle strength and tone are within normal limits   Language No aphasia or dysarthria   Speech:  normal pitch and normal volume   Thought Processes logical; normal rate of thoughts; good abstract reasoning/computation   Thought Associations goal directed   Thought Content not internally preoccupied   Suicidal Ideations none   Homicidal Ideations none   Mood:  anxious  and depressed   Affect:  anxious and depressed   Memory recent  good   Memory remote:  good   Concentration/Attention:  wnl   Fund of Knowledge wnl   Insight:  limited   Reliability fair   Judgment:  limited          VITALS:     Patient Vitals for the past 24 hrs:   Temp Pulse Resp BP SpO2   10/07/17 1550 98.2 °F (36.8 °C) 84 18 121/77 99 %   10/07/17 1205 98 °F (36.7 °C) 73 16 122/81 98 %   10/07/17 0734 98.1 °F (36.7 °C) 66 16 118/77 100 %   10/06/17 2024 98 °F (36.7 °C) 93 20 113/74 99 %   10/06/17 1729 98.4 °F (36.9 °C) 71 16 122/77 99 %     Wt Readings from Last 3 Encounters:   10/06/17 66.7 kg (147 lb)   10/02/17 66.8 kg (147 lb 3.2 oz)   05/17/17 59 kg (130 lb)     Temp Readings from Last 3 Encounters:   10/07/17 98.2 °F (36.8 °C)   10/04/17 98.9 °F (37.2 °C)   10/02/17 98 °F (36.7 °C)     BP Readings from Last 3 Encounters:   10/07/17 121/77   10/04/17 94/80   10/02/17 118/70     Pulse Readings from Last 3 Encounters:   10/07/17 84   10/04/17 87   10/02/17 78            DATA     LABORATORY DATA:(reviewed/updated 10/7/2017)  No results found for this or any previous visit (from the past 24 hour(s)). No results found for: VALF2, VALAC, VALP, VALPR, DS6, CRBAM, CRBAMP, CARB2, XCRBAM  No results found for: LITHM   RADIOLOGY REPORTS:(reviewed/updated 10/7/2017)  No results found. MEDICATIONS     ALL MEDICATIONS:   Current Facility-Administered Medications   Medication Dose Route Frequency    FLUoxetine (PROzac) capsule 20 mg  20 mg Oral DAILY    diphenhydrAMINE (BENADRYL) capsule 50 mg  50 mg Oral QHS PRN    albuterol (PROVENTIL VENTOLIN) nebulizer solution 2.5 mg  2.5 mg Nebulization Q4H PRN    ziprasidone (GEODON) 20 mg in sterile water (preservative free) 1 mL injection  20 mg IntraMUSCular BID PRN    LORazepam (ATIVAN) injection 2 mg  2 mg IntraMUSCular Q4H PRN    LORazepam (ATIVAN) tablet 0.5 mg  0.5 mg Oral Q4H PRN    magnesium hydroxide (MILK OF MAGNESIA) 400 mg/5 mL oral suspension 30 mL  30 mL Oral DAILY PRN    nicotine (NICODERM CQ) 21 mg/24 hr patch 1 Patch  1 Patch TransDERmal DAILY PRN    prenatal vit-iron fumarate-fa (PRENATAL PLUS with IRON) tablet 1 Tab  1 Tab Oral DAILY    ondansetron (ZOFRAN ODT) tablet 4 mg  4 mg Oral Q6H PRN      SCHEDULED MEDICATIONS:   Current Facility-Administered Medications   Medication Dose Route Frequency    FLUoxetine (PROzac) capsule 20 mg  20 mg Oral DAILY    prenatal vit-iron fumarate-fa (PRENATAL PLUS with IRON) tablet 1 Tab  1 Tab Oral DAILY          ASSESSMENT & PLAN     DIAGNOSES REQUIRING ACTIVE TREATMENT AND MONITORING: (reviewed/updated 10/7/2017)  Patient Active Hospital Problem List:   Major depressive disorder, recurrent episode, moderate degree ([facility] [address] (10/5/2017)    Assessment: complicated by marital stressors, financial stressors, cannabis abuse and comorbid anxiety    Plan: Continued inpatient hospitalization for further stabilization, safety monitoring and med. mnt  Provided supportive psychotherapy  Continue Prozac, increase to 20mg.    Start hydrozyzine 25mg prn for anxiety    Pregnant, 2nd trimester  Assessment:uncomplicated  Plan: continued FHT, pnv  10/07/17 continue  Same medications          I will continue to monitor blood levels (Depakote, Tegretol, lithium, clozapine---a drug with a narrow therapeutic index= NTI) and associated labs for drug therapy implemented that require intense monitoring for toxicity as deemed appropriate based on current medication side effects and pharmacodynamically determined drug 1/2 lives. In summary, [place], is a 32 y.o.  female who presents with a severe exacerbation of the principal diagnosis of Major depressive disorder, recurrent episode, moderate degree ([address]  Patient's condition is improving. Patient requires continued inpatient hospitalization for further stabilization, safety monitoring and medication management. I will continue to coordinate the provision of individual, milieu, occupational, group, and substance abuse therapies to address target symptoms/diagnoses as deemed appropriate for the individual patient. A coordinated, multidisplinary treatment team round was conducted with the patient (this team consists of the nurse, psychiatric unit pharmcist,  and writer). Complete current electronic health record for patient has been reviewed today including consultant notes, ancillary staff notes, nurses and psychiatric tech notes. Suicide risk assessment completed and patient deemed to be of low risk for suicide at this time. The following regarding medications was addressed during rounds with patient:   the risks and benefits of the proposed medication. The patient was given the opportunity to ask questions. Informed consent given to the use of the above medications. Will continue to adjust psychiatric and non-psychiatric medications (see above \"medication\" section and orders section for details) as deemed appropriate & based upon diagnoses and response to treatment.      I will continue to order blood tests/labs and diagnostic tests as deemed appropriate and review results as they become available (see orders for details and above listed lab/test results). I will order psychiatric records from previous [facility] hospitals to further elucidate the nature of patient's psychopathology and review once available. I will gather additional collateral information from friends, family and o/p treatment team to further elucidate the nature of patient's psychopathology and baselline level of psychiatric functioning. I certify that this patient's inpatient psychiatric hospital services furnished since the previous certification were, and continue to be, required for treatment that could reasonably be expected to improve the patient's condition, or for diagnostic study, and that the patient continues to need, on a daily basis, active treatment furnished directly by or requiring the supervision of inpatient psychiatric facility personnel. In addition, the hospital records show that services furnished were intensive treatment services, admission or related services, or equivalent services.     EXPECTED DISCHARGE DATE/DAY: TBD     DISPOSITION: Home       Signed By:   Pacheco Washington MD  10/7/2017

## 2017-10-07 NOTE — PROGRESS NOTES
Problem: Falls - Risk of  Goal: *Absence of Falls  Document Edilia Fall Risk and appropriate interventions in the flowsheet. Outcome: Progressing Towards Goal  Fall Risk Interventions:     Pt in bed asleep at this time. NAD. No falls noted/reported. Q 15 minute checks for safety maintained.         Medication Interventions: Teach patient to arise slowly

## 2017-10-07 NOTE — INTERDISCIPLINARY ROUNDS
Behavioral Health Interdisciplinary Rounds     Patient Name: Maya Garcia  Age: 32 y.o. Room/Bed:  731/  Primary Diagnosis: Major depressive disorder, recurrent episode, moderate degree (HCC)   Admission Status: Voluntary     Readmission within 30 days: no  Power of  in place: no  Patient requires a blocked bed: no          Reason for blocked bed: n/a    VTE Prophylaxis: Not indicated  Flu vaccine given : no   Mobility needs/Fall risk: no    Nutritional Plan: yes  Consults: Daily FHTs         Labs/Testing due today?: no    Sleep hours:        Participation in Care/Groups:  yes  Medication Compliant?: Yes  PRNS (last 24 hours):  Antianxiety, Anticholinergic, Sleep Aid and Pain    Restraints (last 24 hours):  no  Substance Abuse:  no  CIWA (range last 24 hours):  COWS (range last 24 hours):   Alcohol screening (AUDIT) completed -  AUDIT Score: 1  If applicable, date SBIRT discussed in treatment team AND documented:   Tobacco - patient is a smoker: yes   Date tobacco education completed by RN: 10/4/17  24 hour chart check complete: yes     Patient goal(s) for today:   Treatment team focus/goals:   Progress note     LOS:  3  Expected LOS:     Financial concerns/prescription coverage:    Date of last family contact:       Family requesting physician contact today:    Discharge plan:   Guns in the home:         Outpatient provider(s):     Participating treatment team members: Maya Garcia, * (assigned SW),

## 2017-10-07 NOTE — PROGRESS NOTES
Problem: Depressed Mood (Adult/Pediatric)  Goal: *STG: Demonstrates reduction in symptoms and increase in insight into coping skills/future focused  Outcome: Progressing Towards Goal  Pt smiling euthymic. Goal oriented. Positive focus. Attending groups. Engaged. Positive for milieu. Goal to continue work on communication with  ongoing. Improving self esteem noted today.

## 2017-10-07 NOTE — PROGRESS NOTES
Problem: Depressed Mood (Adult/Pediatric)  Goal: *STG: Participates in treatment plan  Outcome: Progressing Towards Goal  Pt received in dining room working on [employer]. Completed homework assignment. Had brief uneventful visit from . Some interaction with peers. Will continue to monitor.

## 2017-10-08 PROCEDURE — 74011250637 HC RX REV CODE- 250/637: Performed by: HOSPITALIST

## 2017-10-08 PROCEDURE — 65220000003 HC RM SEMIPRIVATE PSYCH

## 2017-10-08 PROCEDURE — 74011250637 HC RX REV CODE- 250/637: Performed by: PSYCHIATRY & NEUROLOGY

## 2017-10-08 RX ADMIN — DIPHENHYDRAMINE HYDROCHLORIDE 50 MG: 50 CAPSULE ORAL at 22:02

## 2017-10-08 RX ADMIN — FLUOXETINE HYDROCHLORIDE 20 MG: 20 CAPSULE ORAL at 09:09

## 2017-10-08 RX ADMIN — Medication 1 TABLET: at 09:12

## 2017-10-08 RX ADMIN — ONDANSETRON 4 MG: 4 TABLET, ORALLY DISINTEGRATING ORAL at 09:09

## 2017-10-08 NOTE — PROGRESS NOTES
Problem: Depressed Mood (Adult/Pediatric)  Goal: *STG: Demonstrates reduction in symptoms and increase in insight into coping skills/future focused  Outcome: Progressing Towards Goal  Pt goal oriented logical thinking. Bright affect. Positive focus. Positive for milieu. Attending and participating  in groups.

## 2017-10-08 NOTE — PROGRESS NOTES
Hospitalist Progress Note  Kendell Che MD  Office: [phone]        Date of Service:  10/8/2017  NAME:  Drew Gloria  :  1990  MRN:  [id number]      Admission Summary:   a 60-year-old   female who is admitted to [address] [place] [address] for depression with   suicidal ideation. We were asked to consult due to nausea and   dehydration. Interval history / Subjective:     Feels fine     Assessment & Plan:     S/p Vomiting  Leucocytosis   Pregnancy. Patient continue stable, No further need to follow up from medicine. Signing off    Code status: full  DVT prophylaxis: none    Care Plan discussed with: Nurse  Disposition: D     Hospital Problems  Date Reviewed: 2013          Codes Class Noted POA    * (Principal)Major depressive disorder, recurrent episode, moderate degree ([facility] ICD-10-CM: F33.1  ICD-9-CM: 296.32  10/5/2017 Yes                Review of Systems:   A comprehensive review of systems was negative except for that written in the HPI. Vital Signs:    Last 24hrs VS reviewed since prior progress note. Most recent are:  Visit Vitals    /85    Pulse 67    Temp 98.1 °F (36.7 °C)    Resp 16    Wt 65.6 kg (144 lb 9.6 oz)    SpO2 99%    Breastfeeding No    BMI 21.99 kg/m2       No intake or output data in the 24 hours ending 10/08/17 1235     Physical Examination:             Constitutional:  No acute distress, cooperative, pleasant    ENT:  Oral mucous moist, oropharynx benign. Neck supple,    Resp:  CTA bilaterally. No wheezing/rhonchi/rales. No accessory muscle use   CV:  Regular rhythm, normal rate, no murmurs, gallops, rubs    GI:  Soft, non distended, non tender. normoactive bowel sounds, no hepatosplenomegaly     Musculoskeletal:  No edema, warm, 2+ pulses throughout    Neurologic:  Moves all extremities.   AAOx3, CN II-XII reviewed            Data Review:    Review and/or order of clinical lab test      Labs:   No results for input(s): WBC, HGB, HCT, PLT, HGBEXT, HCTEXT, PLTEXT in the last 72 hours. No results for input(s): NA, K, CL, CO2, BUN, CREA, GLU, CA, MG, PHOS, URICA in the last 72 hours. No results for input(s): SGOT, GPT, ALT, AP, TBIL, TBILI, TP, ALB, GLOB, GGT, AML, LPSE in the last 72 hours. No lab exists for component: AMYP, HLPSE  No results for input(s): INR, PTP, APTT in the last 72 hours. No lab exists for component: INREXT   No results for input(s): FE, TIBC, PSAT, FERR in the last 72 hours. No results found for: FOL, RBCF   No results for input(s): PH, PCO2, PO2 in the last 72 hours. No results for input(s): CPK, CKNDX, TROIQ in the last 72 hours.     No lab exists for component: CPKMB  Lab Results   Component Value Date/Time    Cholesterol, total 286 10/05/2017 06:01 AM    HDL Cholesterol 72 10/05/2017 06:01 AM    LDL, calculated 180.4 10/05/2017 06:01 AM    Triglyceride 168 10/05/2017 06:01 AM    CHOL/HDL Ratio 4.0 10/05/2017 06:01 AM     No results found for: [facility]  Lab Results   Component Value Date/Time    Color YELLOW/STRAW 10/04/2017 08:41 PM    Appearance CLEAR 10/04/2017 08:41 PM    Specific gravity 1.015 10/04/2017 08:41 PM    pH (UA) 7.0 10/04/2017 08:41 PM    Protein NEGATIVE  10/04/2017 08:41 PM    Glucose NEGATIVE  10/04/2017 08:41 PM    Ketone NEGATIVE  10/04/2017 08:41 PM    Bilirubin NEGATIVE  10/04/2017 08:41 PM    Urobilinogen 1.0 10/04/2017 08:41 PM    Nitrites NEGATIVE  10/04/2017 08:41 PM    Leukocyte Esterase NEGATIVE  10/04/2017 08:41 PM    Epithelial cells FEW 10/04/2017 08:41 PM    Bacteria NEGATIVE  10/04/2017 08:41 PM    WBC 0-4 10/04/2017 08:41 PM    RBC 0-5 10/04/2017 08:41 PM         Medications Reviewed:     Current Facility-Administered Medications   Medication Dose Route Frequency    FLUoxetine (PROzac) capsule 20 mg  20 mg Oral DAILY    diphenhydrAMINE (BENADRYL) capsule 50 mg  50 mg Oral QHS PRN    albuterol (PROVENTIL VENTOLIN) nebulizer solution 2.5 mg  2.5 mg Nebulization Q4H PRN    ziprasidone (GEODON) 20 mg in sterile water (preservative free) 1 mL injection  20 mg IntraMUSCular BID PRN    LORazepam (ATIVAN) injection 2 mg  2 mg IntraMUSCular Q4H PRN    LORazepam (ATIVAN) tablet 0.5 mg  0.5 mg Oral Q4H PRN    magnesium hydroxide (MILK OF MAGNESIA) 400 mg/5 mL oral suspension 30 mL  30 mL Oral DAILY PRN    nicotine (NICODERM CQ) 21 mg/24 hr patch 1 Patch  1 Patch TransDERmal DAILY PRN    prenatal vit-iron fumarate-fa (PRENATAL PLUS with IRON) tablet 1 Tab  1 Tab Oral DAILY    ondansetron (ZOFRAN ODT) tablet 4 mg  4 mg Oral Q6H PRN     ______________________________________________________________________  EXPECTED LENGTH OF STAY: - - -  ACTUAL LENGTH OF STAY:          4                 Lynnette Lopez MD

## 2017-10-08 NOTE — PROGRESS NOTES
Problem: Depressed Mood (Adult/Pediatric)  Goal: *STG: Participates in treatment plan  Outcome: Progressing Towards Goal  Py complies with scheduled meds. Mood is brighter. She participates in therapeutic activities with peers. Goal: *STG: Verbalizes anger, guilt, and other feelings in a constructive manor  Outcome: Progressing Towards Goal  Pt verbalizes her needs appropriately. Goal: *STG: Demonstrates reduction in symptoms and increase in insight into coping skills/future focused  Outcome: Progressing Towards Goal  Pt has been working on a list of things she is grateful for.

## 2017-10-08 NOTE — BH NOTES
PSYCHIATRIC PROGRESS NOTE         Patient Name  Judy Acevedo   Date of Birth 1990   [facility] [id number]   Medical Record Number  [id number]      Age  32 y.o. PCP None   Admit date:  10/4/2017    Room Number  731/01  @ [facility]   Date of Service  10/8/2017          PSYCHOTHERAPY SESSION NOTE:  Length of psychotherapy session: 20 minutes    Main condition/diagnosis/issues treated during session today, 10/8/2017 : Depressed, Anxious, overwhelmed    I employed Cognitive Behavioral therapy techniques, Reality-Oriented psychotherapy, as well as supportive psychotherapy in regards to various ongoing psychosocial stressors, including the following: pre-admission and current problems; housing issues; academic issues;medical issues; and stress of hospitalization. Interpersonal relationship issues and psychodynamic conflicts explored. Attempts made to alleviate maladaptive patterns. We, also, worked on issues of denial & effects of substance dependency/use     Overall, patient is not progressing    Treatment Plan Update (reviewed an updated 10/8/2017) : I will modify psychotherapy tx plan by implementing more stress management strategies, building upon cognitive behavioral techniques, increasing coping skills, as well as shoring up psychological defenses). An extended energy and skill set was needed to engage pt in psychotherapy due to some of the following: resistiveness, complexity, negativity, confrontational nature, hostile behaviors, and/or severe abnormalities in thought processes/psychosis resulting in the loss of expressive/receptive language communication skills. E & M PROGRESS NOTE:         HISTORY       CC:  \"The mornings are still pretty tough\"  HISTORY OF PRESENT ILLNESS/INTERVAL HISTORY:  (reviewed/updated 10/8/2017). per initial evaluation:   The patient, Judy Acevedo, is a 32 y.o.   WHITE [place]  female with a past psychiatric history significant for MDD, 26 weeks pregnant/ third pregnancy who presents at this time with complaints of (and/or evidence of) the following emotional symptoms:anxious,  depression, suicidal thoughts/threats and agitation. Additional symptomatology include severe marital conflict and ambivalence about her third pregnancy and her marriage. The above symptoms have been present for several weeks These symptoms are of moderate to high severity. These symptoms are high in nature. The patient's condition has been precipitated by severe life stressors including marital conflict, feeling all alone to raise her children and run the household, limited finances and psychosocial stressors. Smoking marijuana regularly. Venkata Justin presents/reports/evidences the following emotional symptoms today, 10/8/2017:depression and anxiety. The above symptoms have been present for several weeks. These symptoms are of moderate to high severity. The symptoms are constant in nature. The patient requested ativan as an outpatient medication, \"just something to help me get through the mornings. \". She showed little interest in working on coping skills, thought records, problem solving, etc.   10/07/17 she is feeling better today and no anger issues and no self harm  Behavior and no aggressive behavior and no acting out behavior   10/08/17 she is doing better and mood is better and no anger issues and no self harm behavior and she does not like Prozac as she has sexual side effects from it         SIDE EFFECTS: (reviewed/updated 10/8/2017)  None reported or admitted to. ALLERGIES:(reviewed/updated 10/8/2017)  No Known Allergies   MEDICATIONS PRIOR TO ADMISSION:(reviewed/updated 10/8/2017)  Prescriptions Prior to Admission   Medication Sig    PRENATAL VIT CALC,IRON,FOLIC (PRENATAL VITAMIN PO) Take  by mouth.  doxylamine succinate (UNISOM, DOXYLAMINE,) 25 mg tablet Take 1 Tab by mouth nightly as needed for Sleep.     amoxicillin (AMOXIL) 500 mg capsule Take 2 Caps by mouth two (2) times a day.  albuterol (PROVENTIL HFA, VENTOLIN HFA) 90 mcg/actuation inhaler Take 2 Puffs by inhalation every four (4) hours as needed for Wheezing. PAST MEDICAL HISTORY: Past medical history from the initial psychiatric evaluation has been reviewed (reviewed/updated 10/8/2017) with no additional updates (I asked patient and no additional past medical history provided). Past Medical History:   Diagnosis Date    Abnormal Pap smear     Routine gynecological examination     [facility]     Past Surgical History:   Procedure Laterality Date    HX GYN      HX TONSILLECTOMY        SOCIAL HISTORY: Social history from the initial psychiatric evaluation has been reviewed (reviewed/updated 10/8/2017) with no additional updates (I asked patient and no additional social history provided). Social History     Social History    Marital status:      Spouse name: N/A    Number of children: N/A    Years of education: N/A     Occupational History    Not on file. Social History Main Topics    Smoking status: Current Every Day Smoker     Packs/day: 1.00     Years: 6.00     Types: Cigarettes    Smokeless tobacco: Never Used    Alcohol use 6.0 oz/week     12 Cans of beer per week      Comment: varies    Drug use: Yes     Special: Marijuana    Sexual activity: Yes     Partners: Male     Birth control/ protection: Implant     Other Topics Concern    Not on file     Social History Narrative    ** Merged History Encounter **           FAMILY HISTORY: Family history from the initial psychiatric evaluation has been reviewed (reviewed/updated 10/8/2017) with no additional updates (I asked patient and no additional family history provided).    Family History   Problem Relation Age of Onset    Hypertension Father        REVIEW OF SYSTEMS: (reviewed/updated 10/8/2017)  Appetite:good   Sleep: good   All other Review of Systems: intermittent nausea MENTAL STATUS EXAM & VITALS     MENTAL STATUS EXAM (MSE):    MSE FINDINGS ARE WITHIN NORMAL LIMITS (WNL) UNLESS OTHERWISE STATED BELOW. ( ALL OF THE BELOW CATEGORIES OF THE MSE HAVE BEEN REVIEWED (reviewed 10/8/2017) AND UPDATED AS DEEMED APPROPRIATE )  General Presentation age appropriate and casually dressed, cooperative   Orientation oriented to time, place and person   Vital Signs  See below (reviewed 10/8/2017); Vital Signs (BP, Pulse, & Temp) are within normal limits if not listed below.    Gait and Station Stable/steady, no ataxia   Musculoskeletal System No extrapyramidal symptoms (EPS); no abnormal muscular movements or Tardive Dyskinesia (TD); muscle strength and tone are within normal limits   Language No aphasia or dysarthria   Speech:  normal pitch and normal volume   Thought Processes logical; normal rate of thoughts; good abstract reasoning/computation   Thought Associations goal directed   Thought Content not internally preoccupied   Suicidal Ideations none   Homicidal Ideations none   Mood:  anxious  and depressed   Affect:  anxious and depressed   Memory recent  good   Memory remote:  good   Concentration/Attention:  wnl   Fund of Knowledge wnl   Insight:  limited   Reliability fair   Judgment:  limited          VITALS:     Patient Vitals for the past 24 hrs:   Temp Pulse Resp BP SpO2   10/08/17 0804 97.7 °F (36.5 °C) 60 16 133/84 100 %   10/07/17 2019 98.2 °F (36.8 °C) 84 16 145/80 97 %   10/07/17 1550 98.2 °F (36.8 °C) 84 18 121/77 99 %   10/07/17 1205 98 °F (36.7 °C) 73 16 122/81 98 %     Wt Readings from Last 3 Encounters:   10/06/17 66.7 kg (147 lb)   10/02/17 66.8 kg (147 lb 3.2 oz)   05/17/17 59 kg (130 lb)     Temp Readings from Last 3 Encounters:   10/08/17 97.7 °F (36.5 °C)   10/04/17 98.9 °F (37.2 °C)   10/02/17 98 °F (36.7 °C)     BP Readings from Last 3 Encounters:   10/08/17 133/84   10/04/17 94/80   10/02/17 118/70     Pulse Readings from Last 3 Encounters:   10/08/17 60 10/04/17 87   10/02/17 78            DATA     LABORATORY DATA:(reviewed/updated 10/8/2017)  No results found for this or any previous visit (from the past 24 hour(s)). No results found for: VALF2, VALAC, VALP, VALPR, DS6, CRBAM, CRBAMP, CARB2, XCRBAM  No results found for: LITHM   RADIOLOGY REPORTS:(reviewed/updated 10/8/2017)  No results found. MEDICATIONS     ALL MEDICATIONS:   Current Facility-Administered Medications   Medication Dose Route Frequency    FLUoxetine (PROzac) capsule 20 mg  20 mg Oral DAILY    diphenhydrAMINE (BENADRYL) capsule 50 mg  50 mg Oral QHS PRN    albuterol (PROVENTIL VENTOLIN) nebulizer solution 2.5 mg  2.5 mg Nebulization Q4H PRN    ziprasidone (GEODON) 20 mg in sterile water (preservative free) 1 mL injection  20 mg IntraMUSCular BID PRN    LORazepam (ATIVAN) injection 2 mg  2 mg IntraMUSCular Q4H PRN    LORazepam (ATIVAN) tablet 0.5 mg  0.5 mg Oral Q4H PRN    magnesium hydroxide (MILK OF MAGNESIA) 400 mg/5 mL oral suspension 30 mL  30 mL Oral DAILY PRN    nicotine (NICODERM CQ) 21 mg/24 hr patch 1 Patch  1 Patch TransDERmal DAILY PRN    prenatal vit-iron fumarate-fa (PRENATAL PLUS with IRON) tablet 1 Tab  1 Tab Oral DAILY    ondansetron (ZOFRAN ODT) tablet 4 mg  4 mg Oral Q6H PRN      SCHEDULED MEDICATIONS:   Current Facility-Administered Medications   Medication Dose Route Frequency    FLUoxetine (PROzac) capsule 20 mg  20 mg Oral DAILY    prenatal vit-iron fumarate-fa (PRENATAL PLUS with IRON) tablet 1 Tab  1 Tab Oral DAILY          ASSESSMENT & PLAN     DIAGNOSES REQUIRING ACTIVE TREATMENT AND MONITORING: (reviewed/updated 10/8/2017)  Patient Active Hospital Problem List:   Major depressive disorder, recurrent episode, moderate degree ([facility] [address] (10/5/2017)    Assessment: complicated by marital stressors, financial stressors, cannabis abuse and comorbid anxiety    Plan: Continued inpatient hospitalization for further stabilization, safety monitoring and med. mngmt  Provided supportive psychotherapy  Continue Prozac, increase to 20mg. Start hydrozyzine 25mg prn for anxiety    Pregnant, 2nd trimester  Assessment:uncomplicated  Plan: continued FHT, pnv  10/07/17 continue  Same medications    10/08/17 consider Wellbutrin         I will continue to monitor blood levels (Depakote, Tegretol, lithium, clozapine---a drug with a narrow therapeutic index= NTI) and associated labs for drug therapy implemented that require intense monitoring for toxicity as deemed appropriate based on current medication side effects and pharmacodynamically determined drug 1/2 lives. In summary, Elle Wood is a 32 y.o.  female who presents with a severe exacerbation of the principal diagnosis of Major depressive disorder, recurrent episode, moderate degree ([address]  Patient's condition is improving. Patient requires continued inpatient hospitalization for further stabilization, safety monitoring and medication management. I will continue to coordinate the provision of individual, milieu, occupational, group, and substance abuse therapies to address target symptoms/diagnoses as deemed appropriate for the individual patient. A coordinated, multidisplinary treatment team round was conducted with the patient (this team consists of the nurse, psychiatric unit pharmcist,  and writer). Complete current electronic health record for patient has been reviewed today including consultant notes, ancillary staff notes, nurses and psychiatric tech notes. Suicide risk assessment completed and patient deemed to be of low risk for suicide at this time. The following regarding medications was addressed during rounds with patient:   the risks and benefits of the proposed medication. The patient was given the opportunity to ask questions. Informed consent given to the use of the above medications.  Will continue to adjust psychiatric and non-psychiatric medications (see above \"medication\" section and orders section for details) as deemed appropriate & based upon diagnoses and response to treatment. I will continue to order blood tests/labs and diagnostic tests as deemed appropriate and review results as they become available (see orders for details and above listed lab/test results). I will order psychiatric records from previous [facility] hospitals to further elucidate the nature of patient's psychopathology and review once available. I will gather additional collateral information from friends, family and o/p treatment team to further elucidate the nature of patient's psychopathology and baselline level of psychiatric functioning. I certify that this patient's inpatient psychiatric hospital services furnished since the previous certification were, and continue to be, required for treatment that could reasonably be expected to improve the patient's condition, or for diagnostic study, and that the patient continues to need, on a daily basis, active treatment furnished directly by or requiring the supervision of inpatient psychiatric facility personnel. In addition, the hospital records show that services furnished were intensive treatment services, admission or related services, or equivalent services.     EXPECTED DISCHARGE DATE/DAY: TBD     DISPOSITION: Home       Signed By:   Christian Neville MD  10/8/2017

## 2017-10-08 NOTE — BH NOTES
PRN Medication Documentation    Specific patient behavior that led to need for PRN medication: Patient requested med to promote sleep.     Staff interventions attempted prior to PRN being given: offered snacks, fluids, TV with peers to relax  PRN medication given: 2223  Benadryl  50 mg tab   Patient response/effectiveness of PRN medication: will monitor

## 2017-10-08 NOTE — BH NOTES
GROUP THERAPY PROGRESS NOTE    Mary Orozco is participating in Community Group.      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

## 2017-10-08 NOTE — PROGRESS NOTES
Problem: Falls - Risk of  Goal: *Absence of Falls  Document Edilia Fall Risk and appropriate interventions in the flowsheet.    Outcome: Progressing Towards Goal  Fall Risk Interventions:   Lying quietly in bed with eyes closed, respirations even and unlabored , NAD noted   Q15 min safety rounds continue, night light on for safety            Medication Interventions: Teach patient to arise slowly

## 2017-10-08 NOTE — BH NOTES
GROUP THERAPY PROGRESS NOTE    Maya Garcia is participating in Leisure-Creative Group. Group time: 15 minutes    Personal goal for participation: \"reached out to my mother and dad. And communicated with my family\"    Goal orientation: personal    Group therapy participation: active    Therapeutic interventions reviewed and discussed: Reviewed unit rules and mickey's personal goals. Impression of participation: Pt was in good spirits throughout group.

## 2017-10-08 NOTE — INTERDISCIPLINARY ROUNDS
Behavioral Health Interdisciplinary Rounds     Patient Name: Kay Gregory  Age: 32 y.o.   Room/Bed:  731/  Primary Diagnosis: Major depressive disorder, recurrent episode, moderate degree (HCC)   Admission Status: Voluntary     Readmission within 30 days: no  Power of  in place: no  Patient requires a blocked bed: no          Reason for blocked bed:     VTE Prophylaxis: Not indicated  Flu vaccine given : no   Mobility needs/Fall risk: no   Nutritional Plan: yes  Consults:  L&D for FHT        Labs/Testing due today?: no    Sleep hours:  6.50      Participation in Care/Groups:  yes  Medication Compliant?: Yes  PRNS (last 24 hours): Benadryl ,     Restraints (last 24 hours):  no  Substance Abuse:  no  CIWA (range last 24 hours):        COWS (range last 24 hours):   Alcohol screening (AUDIT) completed -  AUDIT Score: 1  If applicable, date SBIRT discussed in treatment team AND documented:   Tobacco - patient is a smoker yes :    Date tobacco education completed by RN:  10/4/17                 24 hour chart check complete: yes     Patient goal(s) for today:   Treatment team focus/goals:   Progress note     LOS:  4  Expected LOS:     Financial concerns/prescription coverage  Date of last family contact:      Family requesting physician contact today:    Discharge plan:   Guns in the home:        Outpatient provider(s):    Participating treatment team members: Kay Gregory, * (assigned SW),

## 2017-10-09 VITALS
OXYGEN SATURATION: 100 % | RESPIRATION RATE: 16 BRPM | WEIGHT: 144.6 LBS | HEART RATE: 91 BPM | DIASTOLIC BLOOD PRESSURE: 78 MMHG | BODY MASS INDEX: 21.99 KG/M2 | SYSTOLIC BLOOD PRESSURE: 119 MMHG | TEMPERATURE: 97.9 F

## 2017-10-09 PROCEDURE — 74011250637 HC RX REV CODE- 250/637: Performed by: HOSPITALIST

## 2017-10-09 PROCEDURE — 74011250637 HC RX REV CODE- 250/637: Performed by: PSYCHIATRY & NEUROLOGY

## 2017-10-09 RX ORDER — BUPROPION HYDROCHLORIDE 100 MG/1
100 TABLET, EXTENDED RELEASE ORAL DAILY
Status: DISCONTINUED | OUTPATIENT
Start: 2017-10-09 | End: 2017-10-09

## 2017-10-09 RX ORDER — FLUOXETINE HYDROCHLORIDE 20 MG/1
20 CAPSULE ORAL DAILY
Qty: 15 CAP | Refills: 1 | Status: ON HOLD | OUTPATIENT
Start: 2017-10-09 | End: 2021-12-10

## 2017-10-09 RX ADMIN — FLUOXETINE HYDROCHLORIDE 20 MG: 20 CAPSULE ORAL at 08:58

## 2017-10-09 RX ADMIN — ONDANSETRON 4 MG: 4 TABLET, ORALLY DISINTEGRATING ORAL at 08:58

## 2017-10-09 RX ADMIN — Medication 1 TABLET: at 08:59

## 2017-10-09 NOTE — PROGRESS NOTES
Problem: Depressed Mood (Adult/Pediatric)  Goal: *STG: Participates in treatment plan  Outcome: Progressing Towards Goal  Mood is bright. She participates in therapeutic activities, and socializes with peers. Pt has been focusing on ways that she can better communicate with her  and parents. Problem: Discharge Planning  Goal: *Discharge to safe environment  Outcome: Progressing Towards Goal  Pt is planning on being discharged tomorrow.

## 2017-10-09 NOTE — BH NOTES
GROUP THERAPY PROGRESS NOTE    Drew Gloria is participating in Coping Skills Group.      Group time: 10 minutes    Personal goal for participation: appropriate    Goal orientation: active    Group therapy participation: active  Therapeutic interventions reviewed and discussed: yes    Impression of participation: engaged

## 2017-10-09 NOTE — INTERDISCIPLINARY ROUNDS
Behavioral Health Interdisciplinary Rounds     Patient Name: Prem Johnson  Age: 32 y.o. Room/Bed:  731/  Primary Diagnosis: Major depressive disorder, recurrent episode, moderate degree (HCC)   Admission Status: Voluntary     Readmission within 30 days: no  Power of  in place: no  Patient requires a blocked bed: no          Reason for blocked bed:     VTE Prophylaxis: Not indicated  Flu vaccine given : no, pt declined   Mobility needs/Fall risk: no    Nutritional Plan:  Pregnant , PRN Zofran for nausea   Consults:  L&D for FHT Qday        Labs/Testing due today?: no    Sleep hours:  6       Participation in Care/Groups:  yes  Medication Compliant?: Yes  PRNS (last 24 hours): Antianxiety - Benadryl     Restraints (last 24 hours):  no  Substance Abuse:  no  CIWA (range last 24 hours): COWS (range last 24 hours):   Alcohol screening (AUDIT) completed -  AUDIT Score: 1  If applicable, date SBIRT discussed in treatment team AND documented:   Tobacco - patient is a smoker:   Yes         Date tobacco education completed by RN: 10/4/17 - using [employer]   24 hour chart check complete: Yes     Patient goal(s) for today: Discharge  Treatment team focus/goals: Schedule follow-up; Discharge         Progress note: Patient is upset by sexual side effects from Prozac. LOS:  5  Expected LOS: 5    Financial concerns/prescription coverage:  [employer] Medicaid  Date of last family contact: None     Family requesting physician contact today: No  Discharge plan: Return home when stable for discharge  Guns in the home: No       Outpatient provider(s):  To be linked    Participating treatment team members: Prem Elizabeth, UVALDO Beasley; Dr. Stacy Gold MD; Pal Guthrie, THEA; Geena Garcia, KavitaD

## 2017-10-09 NOTE — BH NOTES
PRN Medication Documentation    Specific patient behavior that led to need for PRN medication: Patient requested med to promote sleep. Staff interventions attempted prior to PRN being given: offered snacks, fluids, reflections group, TV with peers. PRN medication given: 2202  Benadryl  50 mg cap.   Patient response/effectiveness of PRN medication: will monitor

## 2017-10-09 NOTE — BH NOTES
Behavioral Health Transition Record to Provider    Patient Name: Travis Ochoa  YOB: 1990  Medical Record Number: [id number]  Date of Admission: 10/4/2017  Date of Discharge: 10/9/2017    Attending Provider: Vaishnavi Bolivar MD  Discharging Provider: Vaishnavi Bolivar MD  To contact this individual call [phone] and ask the  to page. If unavailable, ask to be transferred to [facility] Provider on call. A Behavioral Health Provider will be available on call 24/7 and during holidays     Primary Care Provider: None    No Known Allergies       H&P Summary Notes      H&P by Vaishnavi Bolivar MD at 10/05/17 1627     Author:  Vaishnavi Bolivar MD Service:  PSYCHIATRY Author Type:  Physician    Filed:  10/05/17 1640 Date of Service:  10/05/17 1627 Status:  Signed    :  Vaishnavi Bolivar MD (Physician)             INITIAL PSYCHIATRIC EVALUATION            IDENTIFICATION:    Patient Name[RG1.1]  Curt Betancur[RG1.2]   Date of Birth[RG1.1] 1990[RG1.2]   CSN[RG1.1] [id number][JK8.3]   UVNMYDZ Record Number[RG1.1]  [id number][KN8.2]      Age[RG1.1]  32 y. o.[RG1.2]   PCP[RG1.1] None[RG1.2]   Admit date:[RG1.1]  10/4/2017[RG1.2]    Room Number[RG1.1]  728/01[RG1.2]  @[RG1.1] [facility][RG1.2]   Date of Service[RG1.1]  10/5/2017[RG1.2]            HISTORY         REASON FOR HOSPITALIZATION:  CC: \"Depression and suicidal ideations\". Pt admitted on a voluntary basis for severe depression and suicidal ideations    HISTORY OF PRESENT ILLNESS:    The patient,[RG1.1] Jemma Betancur[RG1.2], is a[RG1.1] 32 y.o. WHITE OR  female[RG1.2] with a past psychiatric history significant for MDD, 26 weeks pregnant/ third pregnancy who presents at this time with complaints of (and/or evidence of) the following emotional symptoms:anxious,  depression, suicidal thoughts/threats and agitation.   Additional symptomatology include severe marital conflict and ambivalence about her third pregnancy and her marriage. The above symptoms have been present for several weeks These symptoms are of moderate to high severity. These symptoms are high in nature. The patient's condition has been precipitated by severe life stressors including marital conflict, feeling all alone to raise her children and run the household, limited finances and psychosocial stressors. Smoking marijuana regularly. ALLERGIES:[RG1.1] No Known Allergies[RG1.2]   MEDICATIONS PRIOR TO ADMISSION:[RG1.1]   Prescriptions Prior to Admission   Medication Sig    PRENATAL VIT CALC,IRON,FOLIC (PRENATAL VITAMIN PO) Take  by mouth.  doxylamine succinate (UNISOM, DOXYLAMINE,) 25 mg tablet Take 1 Tab by mouth nightly as needed for Sleep.  amoxicillin (AMOXIL) 500 mg capsule Take 2 Caps by mouth two (2) times a day.  albuterol (PROVENTIL HFA, VENTOLIN HFA) 90 mcg/actuation inhaler Take 2 Puffs by inhalation every four (4) hours as needed for Wheezing.[RG1.2]      PAST MEDICAL HISTORY:[RG1.1]   Past Medical History:   Diagnosis Date    Abnormal Pap smear     Routine gynecological examination     [facility]     Past Surgical History:   Procedure Laterality Date    HX GYN      HX TONSILLECTOMY[RG1.2]        SOCIAL HISTORY: Lives with  and two oung children age 11 and 22 mo. Works cleaning houses. [RG1.1]   Social History     Social History    Marital status:      Spouse name: N/A    Number of children: N/A    Years of education: N/A     Occupational History    Not on file.      Social History Main Topics    Smoking status: Current Every Day Smoker     Packs/day: 1.00     Years: 6.00     Types: Cigarettes    Smokeless tobacco: Never Used    Alcohol use 6.0 oz/week     12 Cans of beer per week      Comment: varies    Drug use: Yes     Special: Marijuana    Sexual activity: Yes     Partners: Male     Birth control/ protection: Implant     Other Topics Concern    Not on file     Social History Narrative ** Merged History Encounter **[RG1.2]           FAMILY HISTORY:mother- anxiety[RG1.1]  Family History   Problem Relation Age of Onset    Hypertension Father[RG1.2]        REVIEW OF SYSTEMS:   Gen: denies pain  Resp: denies sob  Cardiac: denies cp  GI: intermittent nausea  Psych: (+)depression, anger  Pertinent items are noted in the History of Present Illness. All other Systems reviewed and are considered negative. MENTAL STATUS EXAM & VITALS     MENTAL STATUS EXAM (MSE):    MSE FINDINGS ARE WITHIN NORMAL LIMITS (WNL) UNLESS OTHERWISE STATED BELOW. ( ALL OF THE BELOW CATEGORIES OF THE MSE HAVE BEEN REVIEWED (reviewed[RG1.1] 10/5/2017[RG1.2]) AND UPDATED AS DEEMED APPROPRIATE )  General Presentation age appropriate and casually dressed, cooperative   Orientation oriented to time, place and person   Vital Signs  See below (reviewed[RG1.1] 10/5/2017[RG1.2]); Vital Signs (BP, Pulse, & Temp) are within normal limits if not listed below.    Gait and Station Stable/steady, no ataxia   Musculoskeletal System No extrapyramidal symptoms (EPS); no abnormal muscular movements or Tardive Dyskinesia (TD); muscle strength and tone are within normal limits   Language No aphasia or dysarthria   Speech:  normal pitch and normal volume   Thought Processes logical; normal rate of thoughts; good abstract reasoning/computation   Thought Associations goal directed   Thought Content not internally preoccupied   Suicidal Ideations intention and death wishes   Homicidal Ideations none   Mood:  angry, hostile , anxious  and depressed   Affect:  anxious, depressed and hostile   Memory recent  good   Memory remote:  good   Concentration/Attention:  wnl   Fund of Knowledge wnl   Insight:  fair   Reliability fair   Judgment:  fair          VITALS:[RG1.1]     Patient Vitals for the past 24 hrs:   Temp Pulse Resp BP SpO2   10/05/17 0808 98.6 °F (37 °C) 84 16 122/78 98 %   10/05/17 0800 97.6 °F (36.4 °C) 77 16 119/78 98 %   10/04/17 2012 98.2 °F (36.8 °C) 80 16 119/75 98 %     Wt Readings from Last 3 Encounters:   10/02/17 66.8 kg (147 lb 3.2 oz)   05/17/17 59 kg (130 lb)   03/10/17 57.2 kg (126 lb 1.7 oz)     Temp Readings from Last 3 Encounters:   10/05/17 98.6 °F (37 °C)   10/04/17 98.9 °F (37.2 °C)   10/02/17 98 °F (36.7 °C)     BP Readings from Last 3 Encounters:   10/05/17 122/78   10/04/17 94/80   10/02/17 118/70     Pulse Readings from Last 3 Encounters:   10/05/17 84   10/04/17 87   10/02/17 78[RG1.2]            DATA     LABORATORY DATA:[RG1.1]  Labs Reviewed   LIPID PANEL - Abnormal; Notable for the following:        Result Value    Cholesterol, total 286 (*)     Triglyceride 168 (*)     LDL, calculated 180.4 (*)     All other components within normal limits   CBC WITH AUTOMATED DIFF - Abnormal; Notable for the following:     WBC 14.1 (*)     NEUTROPHILS 78 (*)     MONOCYTES 4 (*)     ABS.  NEUTROPHILS 11.0 (*)     All other components within normal limits   URINALYSIS W/ REFLEX CULTURE   GLUCOSE, FASTING   TSH 3RD GENERATION     Admission on 10/04/2017   Component Date Value Ref Range Status    Color 10/04/2017 YELLOW/STRAW    Final    Appearance 10/04/2017 CLEAR  CLEAR   Final    Specific gravity 10/04/2017 1.015  1.003 - 1.030   Final    pH (UA) 10/04/2017 7.0  5.0 - 8.0   Final    Protein 10/04/2017 NEGATIVE   NEG mg/dL Final    Glucose 10/04/2017 NEGATIVE   NEG mg/dL Final    Ketone 10/04/2017 NEGATIVE   NEG mg/dL Final    Bilirubin 10/04/2017 NEGATIVE   NEG   Final    Blood 10/04/2017 NEGATIVE   NEG   Final    Urobilinogen 10/04/2017 1.0  0.2 - 1.0 EU/dL Final    Nitrites 10/04/2017 NEGATIVE   NEG   Final    Leukocyte Esterase 10/04/2017 NEGATIVE   NEG   Final    WBC 10/04/2017 0-4  0 - 4 /hpf Final    RBC 10/04/2017 0-5  0 - 5 /hpf Final    Epithelial cells 10/04/2017 FEW  FEW /lpf Final    Bacteria 10/04/2017 NEGATIVE   NEG /hpf Final    UA:UC IF INDICATED 10/04/2017 CULTURE NOT INDICATED BY UA RESULT  CNI Final    Hyaline cast 10/04/2017 0-2  0 - 5 /lpf Final    Glucose 10/05/2017 81  65 - 100 MG/DL Final    TSH 10/05/2017 1.21  0.36 - 3.74 uIU/mL Final    LIPID PROFILE 10/05/2017        Final    Cholesterol, total 10/05/2017 286* <200 MG/DL Final    Triglyceride 10/05/2017 168* <150 MG/DL Final    HDL Cholesterol 10/05/2017 72  MG/DL Final    LDL, calculated 10/05/2017 180.4* 0 - 100 MG/DL Final    VLDL, calculated 10/05/2017 33.6  MG/DL Final    CHOL/HDL Ratio 10/05/2017 4.0  0 - 5.0   Final    WBC 10/05/2017 14.1* 3.6 - 11.0 K/uL Final    RBC 10/05/2017 4.33  3.80 - 5.20 M/uL Final    HGB 10/05/2017 12.6  11.5 - 16.0 g/dL Final    HCT 10/05/2017 37.4  35.0 - 47.0 % Final    MCV 10/05/2017 86.4  80.0 - 99.0 FL Final    MCH 10/05/2017 29.1  26.0 - 34.0 PG Final    MCHC 10/05/2017 33.7  30.0 - 36.5 g/dL Final    RDW 10/05/2017 14.1  11.5 - 14.5 % Final    PLATELET 91/88/1716 885  150 - 400 K/uL Final    NEUTROPHILS 10/05/2017 78* 32 - 75 % Final    LYMPHOCYTES 10/05/2017 17  12 - 49 % Final    MONOCYTES 10/05/2017 4* 5 - 13 % Final    EOSINOPHILS 10/05/2017 1  0 - 7 % Final    BASOPHILS 10/05/2017 0  0 - 1 % Final    ABS. NEUTROPHILS 10/05/2017 11.0* 1.8 - 8.0 K/UL Final    ABS. LYMPHOCYTES 10/05/2017 2.4  0.8 - 3.5 K/UL Final    ABS. MONOCYTES 10/05/2017 0.5  0.0 - 1.0 K/UL Final    ABS. EOSINOPHILS 10/05/2017 0.1  0.0 - 0.4 K/UL Final    ABS.  BASOPHILS 10/05/2017 0.0  0.0 - 0.1 K/UL Final   Admission on 10/04/2017, Discharged on 10/04/2017   Component Date Value Ref Range Status    WBC 10/04/2017 14.5* 3.6 - 11.0 K/uL Final    RBC 10/04/2017 4.54  3.80 - 5.20 M/uL Final    HGB 10/04/2017 13.1  11.5 - 16.0 g/dL Final    HCT 10/04/2017 38.7  35.0 - 47.0 % Final    MCV 10/04/2017 85.2  80.0 - 99.0 FL Final    MCH 10/04/2017 28.9  26.0 - 34.0 PG Final    MCHC 10/04/2017 33.9  30.0 - 36.5 g/dL Final    RDW 10/04/2017 14.0  11.5 - 14.5 % Final    PLATELET 88/49/3098 432  150 - 400 K/uL Final    NEUTROPHILS 10/04/2017 82* 32 - 75 % Final    LYMPHOCYTES 10/04/2017 15  12 - 49 % Final    MONOCYTES 10/04/2017 3* 5 - 13 % Final    EOSINOPHILS 10/04/2017 0  0 - 7 % Final    BASOPHILS 10/04/2017 0  0 - 1 % Final    ABS. NEUTROPHILS 10/04/2017 11.8* 1.8 - 8.0 K/UL Final    ABS. LYMPHOCYTES 10/04/2017 2.1  0.8 - 3.5 K/UL Final    ABS. MONOCYTES 10/04/2017 0.5  0.0 - 1.0 K/UL Final    ABS. EOSINOPHILS 10/04/2017 0.1  0.0 - 0.4 K/UL Final    ABS. BASOPHILS 10/04/2017 0.0  0.0 - 0.1 K/UL Final    Sodium 10/04/2017 139  136 - 145 mmol/L Final    Potassium 10/04/2017 4.1  3.5 - 5.1 mmol/L Final    Chloride 10/04/2017 108  97 - 108 mmol/L Final    CO2 10/04/2017 24  21 - 32 mmol/L Final    Anion gap 10/04/2017 7  5 - 15 mmol/L Final    Glucose 10/04/2017 83  65 - 100 mg/dL Final    BUN 10/04/2017 8  6 - 20 MG/DL Final    Creatinine 10/04/2017 0.59  0.55 - 1.02 MG/DL Final    BUN/Creatinine ratio 10/04/2017 14  12 - 20   Final    GFR est AA 10/04/2017 >60  >60 ml/min/1.73m2 Final    GFR est non-AA 10/04/2017 >60  >60 ml/min/1.73m2 Final    Calcium 10/04/2017 8.4* 8.5 - 10.1 MG/DL Final    Bilirubin, total 10/04/2017 0.3  0.2 - 1.0 MG/DL Final    ALT (SGPT) 10/04/2017 19  12 - 78 U/L Final    AST (SGOT) 10/04/2017 13* 15 - 37 U/L Final    Alk.  phosphatase 10/04/2017 116  45 - 117 U/L Final    Protein, total 10/04/2017 7.2  6.4 - 8.2 g/dL Final    Albumin 10/04/2017 3.3* 3.5 - 5.0 g/dL Final    Globulin 10/04/2017 3.9  2.0 - 4.0 g/dL Final    A-G Ratio 10/04/2017 0.8* 1.1 - 2.2   Final    AMPHETAMINES 10/04/2017 NEGATIVE   NEG   Final    BARBITURATES 10/04/2017 NEGATIVE   NEG   Final    BENZODIAZEPINES 10/04/2017 NEGATIVE   NEG   Final    COCAINE 10/04/2017 NEGATIVE   NEG   Final    METHADONE 10/04/2017 NEGATIVE   NEG   Final    OPIATES 10/04/2017 NEGATIVE   NEG   Final    PCP(PHENCYCLIDINE) 10/04/2017 NEGATIVE   NEG   Final    THC (TH-CANNABINOL) 10/04/2017 POSITIVE* NEG Final    Drug screen comment 10/04/2017 (NOTE)   Final    Salicylate level 60/01/7471 2.2* 2.8 - 20.0 MG/DL Final    Acetaminophen level 10/04/2017 <2* 10 - 30 ug/mL Final    ALCOHOL(ETHYL),SERUM 10/04/2017 <10  <10 MG/DL Final[RG1.2]        RADIOLOGY REPORTS:[RG1.1]  No results found for this or any previous visit. [RG1.2]No results found.            MEDICATIONS       ALL MEDICATIONS[RG1.1]  Current Facility-Administered Medications   Medication Dose Route Frequency    [START ON 10/6/2017] FLUoxetine (PROzac) capsule 10 mg  10 mg Oral DAILY    diphenhydrAMINE (BENADRYL) capsule 50 mg  50 mg Oral QHS PRN    ziprasidone (GEODON) 20 mg in sterile water (preservative free) 1 mL injection  20 mg IntraMUSCular BID PRN    LORazepam (ATIVAN) injection 2 mg  2 mg IntraMUSCular Q4H PRN    LORazepam (ATIVAN) tablet 0.5 mg  0.5 mg Oral Q4H PRN    magnesium hydroxide (MILK OF MAGNESIA) 400 mg/5 mL oral suspension 30 mL  30 mL Oral DAILY PRN    nicotine (NICODERM CQ) 21 mg/24 hr patch 1 Patch  1 Patch TransDERmal DAILY PRN    prenatal vit-iron fumarate-fa (PRENATAL PLUS with IRON) tablet 1 Tab  1 Tab Oral DAILY    ondansetron (ZOFRAN ODT) tablet 4 mg  4 mg Oral Q6H PRN[RG1.2]      SCHEDULED MEDICATIONS[RG1.1]  Current Facility-Administered Medications   Medication Dose Route Frequency    [START ON 10/6/2017] FLUoxetine (PROzac) capsule 10 mg  10 mg Oral DAILY    prenatal vit-iron fumarate-fa (PRENATAL PLUS with IRON) tablet 1 Tab  1 Tab Oral DAILY[RG1.2]                ASSESSMENT & PLAN        The patient,[RG1.1] Jemma Betancur[RG1.2], is a[RG1.1] 32 y.o.  female[RG1.2] who presents at this time for treatment of the following diagnoses:  Patient Active Hospital Problem List:     Major depressive disorder, recurrent episode, moderate degree ([facility] [address] (10/5/2017)    Assessment: moderate to severe depression with SI    Plan: Agree with inpatient hospitalization for further stabilization, safety monitoring and medication management  Medications- start Prozac 10mg, monitor for nausea  Monitor mood  Transfer to general  Provided supportive psychotherapy    Second Trimester Pregnancy  Assessment: uncomplicated, followed by OB outpatient; unwanted but planning to keep  Plan: [address], [place]  Reviewed use of antidepressants in pregnancy including low risk of discontinuation syndrome, pphtn and the risks associated with untreated depression. A coordinated, multidisplinary treatment team (includes the nurse, unit pharmcist,  and writer) round was conducted for this initial evaluation with the patient present. The following regarding medications was addressed during rounds with patient:   the risks and benefits of the proposed medication. The patient was given the opportunity to ask questions. Informed consent given to the use of the above medications. I will continue to adjust psychiatric and non-psychiatric medications (see above \"medication\" section and orders section for details) as deemed appropriate & based upon diagnoses and response to treatment. I have reviewed admission (and previous/old) labs and medical tests in the EHR and or transferring hospital documents. I will continue to order blood tests/labs and diagnostic tests as deemed appropriate and review results as they become available (see orders for details). I have reviewed old psychiatric and medical records available in the EHR. I Will order additional psychiatric records from other institutions to further elucidate the nature of patient's psychopathology and review once available. I will gather additional collateral information from friends, family and o/p treatment team to further elucidate the nature of patient's psychopathology and baselline level of psychiatric functioning.       ESTIMATED LENGTH OF STAY:    3-5       STRENGTHS:  Access to housing/residential stability and Interpersonal/supportive relationships (family, friends, peers)                                        SIGNED:[RG1.1]    Venkat Rivers MD  10/5/2017[RG1.2]       Revision History       User Key Date/Time User Provider Type Action    > RG1.2 10/05/17 1640 Venkat Rivers MD Physician Sign     RG1.1 10/05/17 1627 Venkat Rivers MD Physician               Admission Diagnosis: depression  Depression    * No surgery found *    Results for orders placed or performed during the hospital encounter of 10/04/17   URINALYSIS W/ REFLEX CULTURE   Result Value Ref Range    Color YELLOW/STRAW      Appearance CLEAR CLEAR      Specific gravity 1.015 1.003 - 1.030      pH (UA) 7.0 5.0 - 8.0      Protein NEGATIVE  NEG mg/dL    Glucose NEGATIVE  NEG mg/dL    Ketone NEGATIVE  NEG mg/dL    Bilirubin NEGATIVE  NEG      Blood NEGATIVE  NEG      Urobilinogen 1.0 0.2 - 1.0 EU/dL    Nitrites NEGATIVE  NEG      Leukocyte Esterase NEGATIVE  NEG      WBC 0-4 0 - 4 /hpf    RBC 0-5 0 - 5 /hpf    Epithelial cells FEW FEW /lpf    Bacteria NEGATIVE  NEG /hpf    UA:UC IF INDICATED CULTURE NOT INDICATED BY UA RESULT CNI      Hyaline cast 0-2 0 - 5 /lpf   GLUCOSE, FASTING   Result Value Ref Range    Glucose 81 65 - 100 MG/DL   TSH 3RD GENERATION   Result Value Ref Range    TSH 1.21 0.36 - 3.74 uIU/mL   LIPID PANEL   Result Value Ref Range    LIPID PROFILE          Cholesterol, total 286 (H) <200 MG/DL    Triglyceride 168 (H) <150 MG/DL    HDL Cholesterol 72 MG/DL    LDL, calculated 180.4 (H) 0 - 100 MG/DL    VLDL, calculated 33.6 MG/DL    CHOL/HDL Ratio 4.0 0 - 5.0     CBC WITH AUTOMATED DIFF   Result Value Ref Range    WBC 14.1 (H) 3.6 - 11.0 K/uL    RBC 4.33 3.80 - 5.20 M/uL    HGB 12.6 11.5 - 16.0 g/dL    HCT 37.4 35.0 - 47.0 %    MCV 86.4 80.0 - 99.0 FL    MCH 29.1 26.0 - 34.0 PG    MCHC 33.7 30.0 - 36.5 g/dL    RDW 14.1 11.5 - 14.5 %    PLATELET 668 989 - 158 K/uL    NEUTROPHILS 78 (H) 32 - 75 %    LYMPHOCYTES 17 12 - 49 %    MONOCYTES 4 (L) 5 - 13 %    EOSINOPHILS 1 0 - 7 %    BASOPHILS 0 0 - 1 %    ABS. NEUTROPHILS 11.0 (H) 1.8 - 8.0 K/UL    ABS. LYMPHOCYTES 2.4 0.8 - 3.5 K/UL    ABS. MONOCYTES 0.5 0.0 - 1.0 K/UL    ABS. EOSINOPHILS 0.1 0.0 - 0.4 K/UL    ABS. BASOPHILS 0.0 0.0 - 0.1 K/UL       Immunizations administered during this encounter:   Immunization History   Administered Date(s) Administered    Tdap 2013       Screening for Metabolic Disorders for Patients on Antipsychotic Medications  (Data obtained from the EMR)    Estimated Body Mass Index  Estimated body mass index is 21.99 kg/(m^2) as calculated from the following:    Height as of 10/2/17: 5' 8\" (1.727 m). Weight as of this encounter: 65.6 kg (144 lb 9.6 oz). Vital Signs/Blood Pressure  Visit Vitals    /78    Pulse 91    Temp 97.9 °F (36.6 °C)    Resp 16    Wt 65.6 kg (144 lb 9.6 oz)    LMP 2017    SpO2 100%    Breastfeeding No    BMI 21.99 kg/m2       Blood Glucose/Hemoglobin A1c  Lab Results   Component Value Date/Time    Glucose 81 10/05/2017 06:01 AM        No results found for: HBA1C, HGBE8, DDL5JLIA     Lipid Panel  Lab Results   Component Value Date/Time    Cholesterol, total 286 10/05/2017 06:01 AM    HDL Cholesterol 72 10/05/2017 06:01 AM    LDL, calculated 180.4 10/05/2017 06:01 AM    Triglyceride 168 10/05/2017 06:01 AM    CHOL/HDL Ratio 4.0 10/05/2017 06:01 AM       Discharge Diagnosis: Major depressive disorder, recurrent episode, moderate degree (ICD-10-CM: F33.1)    Discharge Plan: Patient stable and discharged home into care of her . DISCHARGE SUMMARY    Toma Vivar  : 1990  MRN: [id number]    The patient [place] exhibits the ability to control behavior in a less restrictive environment. Patient's level of functioning is improving. No assaultive/destructive behavior has been observed for the past 24 hours. No suicidal/homicidal threat or behavior has been observed for the past 24 hours. There is no evidence of serious medication side effects. Patient has not been in physical or protective restraints for at least the past 24 hours. If weapons involved, how are they secured? No weapons involved. Is patient aware of and in agreement with discharge plan? Yes    Arrangements for medication:  Prescriptions given to patient. Copy of discharge instructions to  provider?:  Dr. Randy Lau; Domenico Del Rosario ([phone])    Arrangements for transportation home:   to . Keep all follow up appointments as scheduled, continue to take prescribed medications per physician instructions. Mental health crisis number:  [phone] or your local mental health crisis line number at [phone]. Discharge Medication List and Instructions:   Discharge Medication List as of 10/9/2017  2:40 PM      START taking these medications    Details   FLUoxetine (PROZAC) 20 mg capsule Take 1 Cap by mouth daily. Indications: Generalized Anxiety Disorder, major depressive disorder, Print, Disp-15 Cap, R-1         CONTINUE these medications which have NOT CHANGED    Details   PRENATAL VIT CALC,IRON,FOLIC (PRENATAL VITAMIN PO) Take  by mouth., Historical Med      albuterol (PROVENTIL HFA, VENTOLIN HFA) 90 mcg/actuation inhaler Take 2 Puffs by inhalation every four (4) hours as needed for Wheezing. Normal, 2 Puff, Disp-1 Inhaler, R-2         STOP taking these medications       doxylamine succinate (UNISOM, DOXYLAMINE,) 25 mg tablet Comments:   Reason for Stopping:               Unresulted Labs     None        To obtain results of studies pending at discharge, please contact N/A.     Follow-up Information     Follow up With Details Comments Contact Info    None   None ([phone]) Patient stated that they have no PCP      Dr. Anni Whitaker On 10/23/2017 Monday [address] [place] [address], [address]  (Located at the [facility])   [facility], [address] [place] [address]  [phone] or [phone]      Mary Zendejas On 11/9/2017 You have an 8:30am appointment with a psychiatrist for medication management. Please arrive 30 minutes early to complete new patient paperwork. Remember to bring your photo ID and insurance card. [facility]  [address], [id number] [address]  [phone]    Mili Stallings On 10/17/2017 You have 1:00pm appointment for therapy. Please arrive 20 minutes to complete new patient paperwork. Remember to bring your photo ID and insurance card. [address] [place] [address] Rd., [address], [address], [address] [facility]  [phone]          Advanced Directive:   Does the patient have an appointed surrogate decision maker? No  Does the patient have a Medical Advance Directive? No  Does the patient have a Psychiatric Advance Directive? No  If the patient does not have a surrogate or Medical Advance Directive AND Psychiatric Advance Directive, the patient was offered information on these advance directives Yes and Patient declined to complete      Patient Instructions: Please continue all medications until otherwise directed by physician. What to do at Home:  Recommended activity: Activity as tolerated,     If you experience any of the following symptoms thoughts of harming self, feeling overwhelmed with anxiety, hopelessness, please follow up with your local crisis number. *  Please give a list of your current medications to your Primary Care Provider. *  Please update this list whenever your medications are discontinued, doses are      changed, or new medications (including over-the-counter products) are added. *  Please carry medication information at all times in case of emergency situations. Tobacco Cessation Discharge Plan:   Is the patient a smoker and needs referral for smoking cessation? Yes  Patient referred to the following for smoking cessation with an appointment? Yes, Patient has an appointment with Dr. Lenka Hayward on 10/23/17. Patient was offered medication to assist with smoking cessation at discharge?  No  Was education for smoking cessation added to the discharge instructions? Yes    Alcohol/Substance Abuse Discharge Plan:   Does the patient have a history of substance/alcohol abuse and requires a referral for treatment? No  Patient referred to the following for substance/alcohol abuse treatment with an appointment? Not applicable  Patient was offered medication to assist with alcohol cessation at discharge? Not applicable  Was education for substance/alcohol abuse added to discharge instructions? Not applicable    Patient discharged to Home; discussed with patient/caregiver, provided to the patient/caregiver either in hard copy or electronically. and patient refused hard copy.

## 2017-10-09 NOTE — PROGRESS NOTES
Problem: Depressed Mood (Adult/Pediatric)  Goal: *STG: Participates in treatment plan  Outcome: Progressing Towards Goal  Review meds, out on unit social w peers and staff. Mood and affect bright, smiling and hopeful. Pt daily goal is to d/c home.  Denies SI, no self harming behaviors

## 2017-10-09 NOTE — PROGRESS NOTES
Problem: Falls - Risk of  Goal: *Absence of Falls  Document Edilia Fall Risk and appropriate interventions in the flowsheet.    Outcome: Progressing Towards Goal  Fall Risk Interventions:   Lying quietly in bed with eyes closed, respirations even and unlabored , NAD noted   Q15 min safety rounds continue , bathroom light on for safety            Medication Interventions: Teach patient to arise slowly

## 2017-10-09 NOTE — DISCHARGE INSTRUCTIONS
DISCHARGE SUMMARY    Pati Paiz  : 1990  MRN: [id number]    The patient Latoya Austin exhibits the ability to control behavior in a less restrictive environment. Patient's level of functioning is improving. No assaultive/destructive behavior has been observed for the past 24 hours. No suicidal/homicidal threat or behavior has been observed for the past 24 hours. There is no evidence of serious medication side effects. Patient has not been in physical or protective restraints for at least the past 24 hours. If weapons involved, how are they secured? No weapons involved. Is patient aware of and in agreement with discharge plan? Yes    Arrangements for medication:  Prescriptions given to patient. Copy of discharge instructions to  provider?:  Dr. Say Aguayo; Jordan Perez ([phone])    Arrangements for transportation home:   to . Keep all follow up appointments as scheduled, continue to take prescribed medications per physician instructions. Mental health crisis number:  523 or your local mental health crisis line number at [phone]. DISCHARGE SUMMARY from Nurse    The following personal items are in your possession at time of discharge:    Dental Appliances: None  Visual Aid: None     Home Medications: None  Jewelry: Other (comment) (toe ring w pt)  Clothing: Pants, Shirt, Slippers, Undergarments  Other Valuables: Cell Phone, Lighter/matches, Lesle Liudmila (id/isurance/gift cars multiple)  Personal Items Sent to Safe: cash, check card and ebt card          PATIENT INSTRUCTIONS:      What to do at Home:  Recommended activity: Activity as tolerated,     If you experience any of the following symptoms thoughts of harming self, feeling overwhelmed with anxiety, hopelessness, please follow up with your local crisis number. *  Please give a list of your current medications to your Primary Care Provider.     *  Please update this list whenever your medications are discontinued, doses are      changed, or new medications (including over-the-counter products) are added. *  Please carry medication information at all times in case of emergency situations. These are general instructions for a healthy lifestyle:    No smoking/ No tobacco products/ Avoid exposure to second hand smoke    Surgeon General's Warning:  Quitting smoking now greatly reduces serious risk to your health. Obesity, smoking, and sedentary lifestyle greatly increases your risk for illness    A healthy diet, regular physical exercise & weight monitoring are important for maintaining a healthy lifestyle    You may be retaining fluid if you have a history of heart failure or if you experience any of the following symptoms:  Weight gain of 3 pounds or more overnight or 5 pounds in a week, increased swelling in our hands or feet or shortness of breath while lying flat in bed. Please call your doctor as soon as you notice any of these symptoms; do not wait until your next office visit. Recognize signs and symptoms of STROKE:    F-face looks uneven    A-arms unable to move or move unevenly    S-speech slurred or non-existent    T-time-call 911 as soon as signs and symptoms begin-DO NOT go       Back to bed or wait to see if you get better-TIME IS BRAIN. Warning Signs of HEART ATTACK     Call 911 if you have these symptoms:   Chest discomfort. Most heart attacks involve discomfort in the center of the chest that lasts more than a few minutes, or that goes away and comes back. It can feel like uncomfortable pressure, squeezing, fullness, or pain.  Discomfort in other areas of the upper body. Symptoms can include pain or discomfort in one or both arms, the back, neck, jaw, or stomach.  Shortness of breath with or without chest discomfort.  Other signs may include breaking out in a cold sweat, nausea, or lightheadedness.   Don't wait more than five minutes to call 911 - MINUTES MATTER! Fast action can save your life. Calling 911 is almost always the fastest way to get lifesaving treatment. Emergency Medical Services staff can begin treatment when they arrive -- up to an hour sooner than if someone gets to the hospital by car. The discharge information has been reviewed with the patient. The patient verbalized understanding. Discharge medications reviewed with the patient and appropriate educational materials and side effects teaching were provided.

## 2017-10-09 NOTE — BH NOTES
Pt discharged.  picked her up outside unit. All belongings returned. Discharge instructions reviewed with Pt and opportunity for questions given. Pt in no acute distress.

## 2017-10-09 NOTE — BH NOTES
GROUP THERAPY PROGRESS NOTE    Drew Gloria participated in a Morning Process Group on the General Unit with a focus on identifying feelings, planning for the day, and learning more about DBT concepts of \"Mindfulness. \"     Group time: 75 minutes. Personal goal for participation: To identify feelings and increase the capacity to manage ones ability to cope. Goal orientation: The patient will be able to introduce themselves to their peers, identify their feelings, and consider the importance of coping skill development. The group session included a didactic section and the opportunity for patients to respond. Group therapy participation: This patient actively participated in the therapy session. Therapeutic interventions reviewed and discussed: The patients were encouraged to identify themselves by their first names, acknowledge their feelings, and define a goal for their day. This was followed by a didactic session on DBT mindsets. These concepts included:   1) One-Mindfully: In the moment on the front and the following suggestions on the back of the card: a) Just do one thing at a time; b) Let go of distractions; c) Come back to the moment and what you are doing; and d) Do each thing with all your attention. 2) Effectiveness: Focus on what works: a) Do what needs to be done; b) Play by the rules (I am not an exception) and consider the context; c) Act skillfully within the given situation; d) Keep an eye on your objectives (and do what is necessary); e) Let go of vengeance, useless anger, and the need to be righteous. 3) Observe: Just notice: a) Have a Timmy mind; b) Control your attention, cling to nothing; c) Be alert; d) Step inside and observe; e) Watch thoughts come and go; f) Notice what flows through your senses.    4) Nonjudgmental Stance: a) Be aware but dont evaluate; b) Sort opinions from facts; c) Accept each moment; d) Acknowledge the helpful and the harmful, but dont  it; e) Dont  your judging. 5) Wise Mind: a) Integration of emotion mind and reasonable mind; b) Allows intuition; c) Find it in the belly, the center of your head, or by following your breath. At the end of the session all the group members were provided a summary of the topics discussed and a worksheet to review on their own time. Impression of participation: The patient said she was feeling, \"Great,\" with a smile. She indicated she had put together a gratitude list for herself this morning and that she was focused on going home. She said she has been in conversation with her  with plans to [place] over. \" She added, \"I think my coming into the hospital was not only good for me, but also good to help open my 's eyes about all that needs to be taken care of at home. \" She said she plans to follow up with aftercare in [address] [place] [address] and that she was feeling much more confident than she was when she entered the hospital. She expressed no current SI/HI and displayed no overt psychotic symptoms in group. Her affect was mildly euphoric and her mood matched her affect.

## 2017-10-24 NOTE — DISCHARGE SUMMARY
PSYCHIATRIC DISCHARGE SUMMARY         IDENTIFICATION:    Patient Name  Drew Gloria   Date of Birth 1990   [facility] [id number]   Medical Record Number  [id number]      Age  32 y.o. PCP None   Admit date:  10/4/2017    Discharge date: 10/9/17   Room Number  731/01  @ [facility]   Date of Service  10/9/17            TYPE OF DISCHARGE: REGULAR               CONDITION AT DISCHARGE: improved       PROVISIONAL & DISCHARGE DIAGNOSES:    Problem List  Date Reviewed: 2/27/2013          Codes Class    * (Principal)Major depressive disorder, recurrent episode, moderate degree ([facility] [address] ICD-10-CM: F33.1  ICD-9-CM: 296.32         Depression ICD-10-CM: F32.9  ICD-9-CM: 311         Abnormal Pap smear ICD-10-CM: [id number]  ICD-9-CM: [address] Problems    *Major depressive disorder, recurrent episode, moderate degree ([facility] [address]        DISCHARGE DIAGNOSIS:   Axis I:  SEE ABOVE  Axis II: SEE ABOVE  Axis III: SEE ABOVE  Axis IV:  Marital conflict; limited supports; Axis V:  45 on admission, 55on discharge 65(baseline)       CC & HISTORY OF PRESENT ILLNESS:  The patient, Drew Gloria, is a 32 y.o. WHITE [place]  female with a past psychiatric history significant for MDD, 26 weeks pregnant/ third pregnancy who presents at this time with complaints of (and/or evidence of) the following emotional symptoms:anxious,  depression, suicidal thoughts/threats and agitation. Additional symptomatology include severe marital conflict and ambivalence about her third pregnancy and her marriage. The above symptoms have been present for several weeks These symptoms are of moderate to high severity. These symptoms are high in nature. The patient's condition has been precipitated by severe life stressors including marital conflict, feeling all alone to raise her children and run the household, limited finances and psychosocial stressors. Smoking marijuana regularly.       SOCIAL HISTORY:    Social History     Social History    Marital status:      Spouse name: N/A    Number of children: N/A    Years of education: N/A     Occupational History    Not on file. Social History Main Topics    Smoking status: Current Every Day Smoker     Packs/day: 1.00     Years: 6.00     Types: Cigarettes    Smokeless tobacco: Never Used    Alcohol use 6.0 oz/week     12 Cans of beer per week      Comment: varies    Drug use: Yes     Special: Marijuana    Sexual activity: Yes     Partners: Male     Birth control/ protection: Implant     Other Topics Concern    Not on file     Social History Narrative    ** Merged History Encounter **           FAMILY HISTORY:   Family History   Problem Relation Age of Onset    Hypertension Father              HOSPITALIZATION COURSE:    Maya Garcia was admitted to the inpatient psychiatric unit [facility] for acute psychiatric stabilization in regards to symptomatology as described in the HPI above. The differential diagnosis at time of admission included:MDD vs. Adjustment disorder vs. Cannabis dependence. While on the unit Maya Garcia was involved in individual, group, occupational and milieu therapy. Psychiatric medications were adjusted during this hospitalization including (see below). Maya Garcia demonstrated a slow, but progressive improvement in overall condition. Much of patient's depression appeared to be related to situational stressors, effects of drugs of abuse, and psychological factors. Please see individual progress notes for more specific details regarding patient's hospitalization course. At time of discharge, Maya Garcia is without significant problems of depression psychosis  hillary. Patient free of suicidal and homicidal ideations (appears to be at very low risk of suicide or homicide) and reports many positive predictive factors in terms of not attempting suicide or homicide.  Overall presentation at time of discharge is most consistent with the diagnosis of MDD. Patient with request for discharge today. There are no grounds to seek a TDO. Patient has maximized benefit to be derived from acute inpatient psychiatric treatment. All members of the treatment team concur with each other in regards to plans for discharge today per patient's request.  Patient and family are aware and in agreement with discharge and discharge plan. LABS AND IMAGING:    Labs Reviewed   LIPID PANEL - Abnormal; Notable for the following:        Result Value    Cholesterol, total 286 (*)     Triglyceride 168 (*)     LDL, calculated 180.4 (*)     All other components within normal limits   CBC WITH AUTOMATED DIFF - Abnormal; Notable for the following:     WBC 14.1 (*)     NEUTROPHILS 78 (*)     MONOCYTES 4 (*)     ABS.  NEUTROPHILS 11.0 (*)     All other components within normal limits   URINALYSIS W/ REFLEX CULTURE   GLUCOSE, FASTING   TSH 3RD GENERATION     No results found for: DS35, PHEN, PHENO, PHENT, DILF, DS39, PHENY, PTN, VALF2, VALAC, VALP, VALPR, DS6, CRBAM, CRBAMP, CARB2, XCRBAM  Admission on 10/04/2017, Discharged on 10/09/2017   Component Date Value Ref Range Status    Color 10/04/2017 YELLOW/STRAW    Final    Appearance 10/04/2017 CLEAR  CLEAR   Final    Specific gravity 10/04/2017 1.015  1.003 - 1.030   Final    pH (UA) 10/04/2017 7.0  5.0 - 8.0   Final    Protein 10/04/2017 NEGATIVE   NEG mg/dL Final    Glucose 10/04/2017 NEGATIVE   NEG mg/dL Final    Ketone 10/04/2017 NEGATIVE   NEG mg/dL Final    Bilirubin 10/04/2017 NEGATIVE   NEG   Final    Blood 10/04/2017 NEGATIVE   NEG   Final    Urobilinogen 10/04/2017 1.0  0.2 - 1.0 EU/dL Final    Nitrites 10/04/2017 NEGATIVE   NEG   Final    Leukocyte Esterase 10/04/2017 NEGATIVE   NEG   Final    WBC 10/04/2017 0-4  0 - 4 /hpf Final    RBC 10/04/2017 0-5  0 - 5 /hpf Final    Epithelial cells 10/04/2017 FEW  FEW /lpf Final    Bacteria 10/04/2017 NEGATIVE   NEG /hpf Final  UA:UC IF INDICATED 10/04/2017 CULTURE NOT INDICATED BY UA RESULT  CNI   Final    Hyaline cast 10/04/2017 0-2  0 - 5 /lpf Final    Glucose 10/05/2017 81  65 - 100 MG/DL Final    TSH 10/05/2017 1.21  0.36 - 3.74 uIU/mL Final    LIPID PROFILE 10/05/2017        Final    Cholesterol, total 10/05/2017 286* <200 MG/DL Final    Triglyceride 10/05/2017 168* <150 MG/DL Final    HDL Cholesterol 10/05/2017 72  MG/DL Final    LDL, calculated 10/05/2017 180.4* 0 - 100 MG/DL Final    VLDL, calculated 10/05/2017 33.6  MG/DL Final    CHOL/HDL Ratio 10/05/2017 4.0  0 - 5.0   Final    WBC 10/05/2017 14.1* 3.6 - 11.0 K/uL Final    RBC 10/05/2017 4.33  3.80 - 5.20 M/uL Final    HGB 10/05/2017 12.6  11.5 - 16.0 g/dL Final    HCT 10/05/2017 37.4  35.0 - 47.0 % Final    MCV 10/05/2017 86.4  80.0 - 99.0 FL Final    MCH 10/05/2017 29.1  26.0 - 34.0 PG Final    MCHC 10/05/2017 33.7  30.0 - 36.5 g/dL Final    RDW 10/05/2017 14.1  11.5 - 14.5 % Final    PLATELET 06/88/2797 945  150 - 400 K/uL Final    NEUTROPHILS 10/05/2017 78* 32 - 75 % Final    LYMPHOCYTES 10/05/2017 17  12 - 49 % Final    MONOCYTES 10/05/2017 4* 5 - 13 % Final    EOSINOPHILS 10/05/2017 1  0 - 7 % Final    BASOPHILS 10/05/2017 0  0 - 1 % Final    ABS. NEUTROPHILS 10/05/2017 11.0* 1.8 - 8.0 K/UL Final    ABS. LYMPHOCYTES 10/05/2017 2.4  0.8 - 3.5 K/UL Final    ABS. MONOCYTES 10/05/2017 0.5  0.0 - 1.0 K/UL Final    ABS. EOSINOPHILS 10/05/2017 0.1  0.0 - 0.4 K/UL Final    ABS.  BASOPHILS 10/05/2017 0.0  0.0 - 0.1 K/UL Final   Admission on 10/04/2017, Discharged on 10/04/2017   Component Date Value Ref Range Status    WBC 10/04/2017 14.5* 3.6 - 11.0 K/uL Final    RBC 10/04/2017 4.54  3.80 - 5.20 M/uL Final    HGB 10/04/2017 13.1  11.5 - 16.0 g/dL Final    HCT 10/04/2017 38.7  35.0 - 47.0 % Final    MCV 10/04/2017 85.2  80.0 - 99.0 FL Final    MCH 10/04/2017 28.9  26.0 - 34.0 PG Final    MCHC 10/04/2017 33.9  30.0 - 36.5 g/dL Final    RDW 10/04/2017 14.0  11.5 - 14.5 % Final    PLATELET 43/90/9712 790  150 - 400 K/uL Final    NEUTROPHILS 10/04/2017 82* 32 - 75 % Final    LYMPHOCYTES 10/04/2017 15  12 - 49 % Final    MONOCYTES 10/04/2017 3* 5 - 13 % Final    EOSINOPHILS 10/04/2017 0  0 - 7 % Final    BASOPHILS 10/04/2017 0  0 - 1 % Final    ABS. NEUTROPHILS 10/04/2017 11.8* 1.8 - 8.0 K/UL Final    ABS. LYMPHOCYTES 10/04/2017 2.1  0.8 - 3.5 K/UL Final    ABS. MONOCYTES 10/04/2017 0.5  0.0 - 1.0 K/UL Final    ABS. EOSINOPHILS 10/04/2017 0.1  0.0 - 0.4 K/UL Final    ABS. BASOPHILS 10/04/2017 0.0  0.0 - 0.1 K/UL Final    Sodium 10/04/2017 139  136 - 145 mmol/L Final    Potassium 10/04/2017 4.1  3.5 - 5.1 mmol/L Final    Chloride 10/04/2017 108  97 - 108 mmol/L Final    CO2 10/04/2017 24  21 - 32 mmol/L Final    Anion gap 10/04/2017 7  5 - 15 mmol/L Final    Glucose 10/04/2017 83  65 - 100 mg/dL Final    BUN 10/04/2017 8  6 - 20 MG/DL Final    Creatinine 10/04/2017 0.59  0.55 - 1.02 MG/DL Final    BUN/Creatinine ratio 10/04/2017 14  12 - 20   Final    GFR est AA 10/04/2017 >60  >60 ml/min/1.73m2 Final    GFR est non-AA 10/04/2017 >60  >60 ml/min/1.73m2 Final    Calcium 10/04/2017 8.4* 8.5 - 10.1 MG/DL Final    Bilirubin, total 10/04/2017 0.3  0.2 - 1.0 MG/DL Final    ALT (SGPT) 10/04/2017 19  12 - 78 U/L Final    AST (SGOT) 10/04/2017 13* 15 - 37 U/L Final    Alk.  phosphatase 10/04/2017 116  45 - 117 U/L Final    Protein, total 10/04/2017 7.2  6.4 - 8.2 g/dL Final    Albumin 10/04/2017 3.3* 3.5 - 5.0 g/dL Final    Globulin 10/04/2017 3.9  2.0 - 4.0 g/dL Final    A-G Ratio 10/04/2017 0.8* 1.1 - 2.2   Final    AMPHETAMINES 10/04/2017 NEGATIVE   NEG   Final    BARBITURATES 10/04/2017 NEGATIVE   NEG   Final    BENZODIAZEPINES 10/04/2017 NEGATIVE   NEG   Final    COCAINE 10/04/2017 NEGATIVE   NEG   Final    METHADONE 10/04/2017 NEGATIVE   NEG   Final    OPIATES 10/04/2017 NEGATIVE   NEG   Final    PCP(PHENCYCLIDINE) 10/04/2017 NEGATIVE   NEG   Final    THC (TH-CANNABINOL) 10/04/2017 POSITIVE* NEG   Final    Drug screen comment 10/04/2017 (NOTE)   Final    Salicylate level 86/29/8140 2.2* 2.8 - 20.0 MG/DL Final    Acetaminophen level 10/04/2017 <2* 10 - 30 ug/mL Final    ALCOHOL(ETHYL),SERUM 10/04/2017 <10  <10 MG/DL Final   Admission on 10/02/2017, Discharged on 10/03/2017   Component Date Value Ref Range Status    WBC 10/02/2017 20.1* 3.6 - 11.0 K/uL Final    RBC 10/02/2017 4.14  3.80 - 5.20 M/uL Final    HGB 10/02/2017 12.1  11.5 - 16.0 g/dL Final    HCT 10/02/2017 35.3  35.0 - 47.0 % Final    MCV 10/02/2017 85.3  80.0 - 99.0 FL Final    MCH 10/02/2017 29.2  26.0 - 34.0 PG Final    MCHC 10/02/2017 34.3  30.0 - 36.5 g/dL Final    RDW 10/02/2017 13.9  11.5 - 14.5 % Final    PLATELET 06/63/8193 987  150 - 400 K/uL Final    NEUTROPHILS 10/02/2017 88* 32 - 75 % Final    LYMPHOCYTES 10/02/2017 10* 12 - 49 % Final    MONOCYTES 10/02/2017 2* 5 - 13 % Final    EOSINOPHILS 10/02/2017 0  0 - 7 % Final    BASOPHILS 10/02/2017 0  0 - 1 % Final    ABS. NEUTROPHILS 10/02/2017 17.7* 1.8 - 8.0 K/UL Final    ABS. LYMPHOCYTES 10/02/2017 2.0  0.8 - 3.5 K/UL Final    ABS. MONOCYTES 10/02/2017 0.4  0.0 - 1.0 K/UL Final    ABS. EOSINOPHILS 10/02/2017 0.0  0.0 - 0.4 K/UL Final    ABS.  BASOPHILS 10/02/2017 0.0  0.0 - 0.1 K/UL Final    Sodium 10/02/2017 138  136 - 145 mmol/L Final    Potassium 10/02/2017 4.0  3.5 - 5.1 mmol/L Final    Chloride 10/02/2017 105  97 - 108 mmol/L Final    CO2 10/02/2017 21  21 - 32 mmol/L Final    Anion gap 10/02/2017 12  5 - 15 mmol/L Final    Glucose 10/02/2017 99  65 - 100 mg/dL Final    BUN 10/02/2017 8  6 - 20 MG/DL Final    Creatinine 10/02/2017 0.62  0.55 - 1.02 MG/DL Final    BUN/Creatinine ratio 10/02/2017 13  12 - 20   Final    GFR est AA 10/02/2017 >60  >60 ml/min/1.73m2 Final    GFR est non-AA 10/02/2017 >60  >60 ml/min/1.73m2 Final    Calcium 10/02/2017 8.0* 8.5 - 10.1 MG/DL Final    Bilirubin, total 10/02/2017 0.3  0.2 - 1.0 MG/DL Final    ALT (SGPT) 10/02/2017 19  12 - 78 U/L Final    AST (SGOT) 10/02/2017 15  15 - 37 U/L Final    Alk. phosphatase 10/02/2017 113  45 - 117 U/L Final    Protein, total 10/02/2017 7.1  6.4 - 8.2 g/dL Final    Albumin 10/02/2017 3.2* 3.5 - 5.0 g/dL Final    Globulin 10/02/2017 3.9  2.0 - 4.0 g/dL Final    A-G Ratio 10/02/2017 0.8* 1.1 - 2.2   Final    Salicylate level 48/19/4204 2.6* 2.8 - 20.0 MG/DL Final    Acetaminophen level 10/02/2017 4* 10 - 30 ug/mL Final    AMPHETAMINES 10/02/2017 NEGATIVE   NEG   Final    BARBITURATES 10/02/2017 NEGATIVE   NEG   Final    BENZODIAZEPINES 10/02/2017 NEGATIVE   NEG   Final    COCAINE 10/02/2017 NEGATIVE   NEG   Final    METHADONE 10/02/2017 NEGATIVE   NEG   Final    OPIATES 10/02/2017 NEGATIVE   NEG   Final    PCP(PHENCYCLIDINE) 10/02/2017 NEGATIVE   NEG   Final    THC (TH-CANNABINOL) 10/02/2017 POSITIVE* NEG   Final    Drug screen comment 10/02/2017 (NOTE)   Final    Color 10/02/2017 DARK YELLOW    Final    Appearance 10/02/2017 CLEAR  CLEAR   Final    Specific gravity 10/02/2017 1.030  1.003 - 1.030   Final    pH (UA) 10/02/2017 7.0  5.0 - 8.0   Final    Protein 10/02/2017 30* NEG mg/dL Final    Glucose 10/02/2017 NEGATIVE   NEG mg/dL Final    Ketone 10/02/2017 80* NEG mg/dL Final    Bilirubin 10/02/2017 NEGATIVE   NEG   Final    Blood 10/02/2017 NEGATIVE   NEG   Final    Urobilinogen 10/02/2017 0.2  0.2 - 1.0 EU/dL Final    Nitrites 10/02/2017 NEGATIVE   NEG   Final    Leukocyte Esterase 10/02/2017 NEGATIVE   NEG   Final    WBC 10/02/2017 0-4  0 - 4 /hpf Final    RBC 10/02/2017 0-5  0 - 5 /hpf Final    Epithelial cells 10/02/2017 MODERATE* FEW /lpf Final    Bacteria 10/02/2017 NEGATIVE   NEG /hpf Final    Mucus 10/02/2017 1+* NEG /lpf Final    Hyaline cast 10/02/2017 0-2  0 - 5 /lpf Final     No results found. DISPOSITION:    Home.  Patient to f/u with psychiatric, and psychotherapy appointments. Patient is to f/u with internist as directed. FOLLOW-UP CARE:    Activity as tolerated  Regular Diet  Wound Care: none needed. Follow-up Information     Follow up With Details Comments Contact Info    None   None ([phone]) Patient stated that they have no PCP      Dr. Rashaad Owen On 10/23/2017 Monday [address] [facility], [address]  (Located at the [facility])   Gabriela Juarez  [phone] or [phone]      Nicholas Salcedo On 11/9/2017 You have an 8:30am appointment with a psychiatrist for medication management. Please arrive 30 minutes early to complete new patient paperwork. Remember to bring your photo ID and insurance card. [facility] [address], [address] [place] [address]  [phone]    Jay Madera On 10/17/2017 You have 1:00pm appointment for therapy. Please arrive 20 minutes to complete new patient paperwork. Remember to bring your photo ID and insurance card. [address] [place] [address] Rd[address], [address], [address] [facility]  [phone]                 PROGNOSIS:   [facility]---- based on nature of patient's pathology/ies and treatment compliance issues. Prognosis is greatly dependent upon patient's ability to remain sober and to follow up with drug/etoh rehabilitation and psychiatric/psychotherapy appointments as well as to comply with psychiatric medications as prescribed. DISCHARGE MEDICATIONS:     Informed consent given for the use of following psychotropic medications:  Discharge Medication List as of 10/9/2017  2:40 PM      START taking these medications    Details   FLUoxetine (PROZAC) 20 mg capsule Take 1 Cap by mouth daily.  Indications: Generalized Anxiety Disorder, major depressive disorder, Print, Disp-15 Cap, R-1         CONTINUE these medications which have NOT CHANGED    Details   PRENATAL VIT CALC,IRON,FOLIC (PRENATAL VITAMIN PO) Take  by mouth., Historical Med      albuterol (PROVENTIL HFA, VENTOLIN HFA) 90 mcg/actuation inhaler Take 2 Puffs by inhalation every four (4) hours as needed for Wheezing. Normal, 2 Puff, Disp-1 Inhaler, R-2         STOP taking these medications       doxylamine succinate (UNISOM, DOXYLAMINE,) 25 mg tablet Comments:   Reason for Stopping:                      A coordinated, multidisplinary treatment team round was conducted with Sergio Betancur---this is done daily here at [facility]. This team consists of the nurse, psychiatric unit pharmcist,  and writer. I have spent greater than 35 minutes on discharge work.     Signed:  Javier Corral MD  10/9/17

## 2017-12-05 ENCOUNTER — HOSPITAL ENCOUNTER (EMERGENCY)
Age: 27
Discharge: HOME | End: 2017-12-05
Attending: STUDENT IN AN ORGANIZED HEALTH CARE EDUCATION/TRAINING PROGRAM
Payer: MEDICAID

## 2017-12-05 ENCOUNTER — APPOINTMENT (OUTPATIENT)
Dept: ULTRASOUND IMAGING | Age: 27
End: 2017-12-05
Attending: STUDENT IN AN ORGANIZED HEALTH CARE EDUCATION/TRAINING PROGRAM
Payer: MEDICAID

## 2017-12-05 VITALS
OXYGEN SATURATION: 100 % | BODY MASS INDEX: 25.61 KG/M2 | DIASTOLIC BLOOD PRESSURE: 71 MMHG | WEIGHT: 169 LBS | HEIGHT: 68 IN | SYSTOLIC BLOOD PRESSURE: 123 MMHG | TEMPERATURE: 98.3 F | HEART RATE: 59 BPM | RESPIRATION RATE: 18 BRPM

## 2017-12-05 DIAGNOSIS — M25.511 PAIN IN JOINT OF RIGHT SHOULDER: Primary | ICD-10-CM

## 2017-12-05 LAB
ALBUMIN SERPL-MCNC: 2.6 G/DL (ref 3.5–5)
ALBUMIN/GLOB SERPL: 0.6 {RATIO} (ref 1.1–2.2)
ALP SERPL-CCNC: 246 U/L (ref 45–117)
ALT SERPL-CCNC: 23 U/L (ref 12–78)
ANION GAP SERPL CALC-SCNC: 8 MMOL/L (ref 5–15)
APPEARANCE UR: CLEAR
AST SERPL-CCNC: 30 U/L (ref 15–37)
BACTERIA URNS QL MICRO: NEGATIVE /HPF
BASOPHILS # BLD: 0 K/UL (ref 0–0.1)
BASOPHILS NFR BLD: 0 % (ref 0–1)
BILIRUB SERPL-MCNC: 0.3 MG/DL (ref 0.2–1)
BILIRUB UR QL: NEGATIVE
BUN SERPL-MCNC: 6 MG/DL (ref 6–20)
BUN/CREAT SERPL: 8 (ref 12–20)
CALCIUM SERPL-MCNC: 8.5 MG/DL (ref 8.5–10.1)
CHLORIDE SERPL-SCNC: 105 MMOL/L (ref 97–108)
CO2 SERPL-SCNC: 24 MMOL/L (ref 21–32)
COLOR UR: NORMAL
CREAT SERPL-MCNC: 0.73 MG/DL (ref 0.55–1.02)
DIFFERENTIAL METHOD BLD: ABNORMAL
EOSINOPHIL # BLD: 0.2 K/UL (ref 0–0.4)
EOSINOPHIL NFR BLD: 1 % (ref 0–7)
EPITH CASTS URNS QL MICRO: NORMAL /LPF
ERYTHROCYTE [DISTWIDTH] IN BLOOD BY AUTOMATED COUNT: 13.7 % (ref 11.5–14.5)
GLOBULIN SER CALC-MCNC: 4.2 G/DL (ref 2–4)
GLUCOSE SERPL-MCNC: 109 MG/DL (ref 65–100)
GLUCOSE UR STRIP.AUTO-MCNC: NEGATIVE MG/DL
HCT VFR BLD AUTO: 34.4 % (ref 35–47)
HGB BLD-MCNC: 11.6 G/DL (ref 11.5–16)
HGB UR QL STRIP: NEGATIVE
HYALINE CASTS URNS QL MICRO: NORMAL /LPF (ref 0–5)
KETONES UR QL STRIP.AUTO: NEGATIVE MG/DL
LEUKOCYTE ESTERASE UR QL STRIP.AUTO: NEGATIVE
LYMPHOCYTES # BLD: 2.3 K/UL (ref 0.8–3.5)
LYMPHOCYTES NFR BLD: 15 % (ref 12–49)
MCH RBC QN AUTO: 29.3 PG (ref 26–34)
MCHC RBC AUTO-ENTMCNC: 33.7 G/DL (ref 30–36.5)
MCV RBC AUTO: 86.9 FL (ref 80–99)
MONOCYTES # BLD: 0.5 K/UL (ref 0–1)
MONOCYTES NFR BLD: 3 % (ref 5–13)
NEUTS SEG # BLD: 12.5 K/UL (ref 1.8–8)
NEUTS SEG NFR BLD: 81 % (ref 32–75)
NITRITE UR QL STRIP.AUTO: NEGATIVE
PH UR STRIP: 7 [PH] (ref 5–8)
PLATELET # BLD AUTO: 197 K/UL (ref 150–400)
POTASSIUM SERPL-SCNC: 3.6 MMOL/L (ref 3.5–5.1)
PROT SERPL-MCNC: 6.8 G/DL (ref 6.4–8.2)
PROT UR STRIP-MCNC: NEGATIVE MG/DL
RBC # BLD AUTO: 3.96 M/UL (ref 3.8–5.2)
RBC #/AREA URNS HPF: NORMAL /HPF (ref 0–5)
RBC MORPH BLD: ABNORMAL
SODIUM SERPL-SCNC: 137 MMOL/L (ref 136–145)
SP GR UR REFRACTOMETRY: 1.01 (ref 1–1.03)
UROBILINOGEN UR QL STRIP.AUTO: 0.2 EU/DL (ref 0.2–1)
WBC # BLD AUTO: 15.5 K/UL (ref 3.6–11)
WBC URNS QL MICRO: NORMAL /HPF (ref 0–4)

## 2017-12-05 PROCEDURE — 80053 COMPREHEN METABOLIC PANEL: CPT | Performed by: STUDENT IN AN ORGANIZED HEALTH CARE EDUCATION/TRAINING PROGRAM

## 2017-12-05 PROCEDURE — 36415 COLL VENOUS BLD VENIPUNCTURE: CPT | Performed by: STUDENT IN AN ORGANIZED HEALTH CARE EDUCATION/TRAINING PROGRAM

## 2017-12-05 PROCEDURE — 76705 ECHO EXAM OF ABDOMEN: CPT

## 2017-12-05 PROCEDURE — 81003 URINALYSIS AUTO W/O SCOPE: CPT | Performed by: STUDENT IN AN ORGANIZED HEALTH CARE EDUCATION/TRAINING PROGRAM

## 2017-12-05 PROCEDURE — 74011250637 HC RX REV CODE- 250/637: Performed by: STUDENT IN AN ORGANIZED HEALTH CARE EDUCATION/TRAINING PROGRAM

## 2017-12-05 PROCEDURE — 99283 EMERGENCY DEPT VISIT LOW MDM: CPT

## 2017-12-05 PROCEDURE — 85025 COMPLETE CBC W/AUTO DIFF WBC: CPT | Performed by: STUDENT IN AN ORGANIZED HEALTH CARE EDUCATION/TRAINING PROGRAM

## 2017-12-05 RX ORDER — ACETAMINOPHEN 325 MG/1
650 TABLET ORAL
Status: COMPLETED | OUTPATIENT
Start: 2017-12-05 | End: 2017-12-05

## 2017-12-05 RX ADMIN — ACETAMINOPHEN 650 MG: 325 TABLET, FILM COATED ORAL at 11:45

## 2017-12-05 NOTE — ED TRIAGE NOTES
Pt arrives with R shoulder pain since yesterday, worse with movement. Pt is 34 weeks pregnant. Denies injury.

## 2017-12-05 NOTE — ED PROVIDER NOTES
HPI Comments: 32 y.o. A0 female with no significant past medical history who presents from home via private vehicle with chief complaint of shoulder pain. Pt reports yesterday having episodic, worsening right should pain radiating to her right upper back and axilla. Pt reports taking Tylenol for pain last night with no relief. Pt reports she is 34 weeks pregnant with her third child, due 18. Pt reports both children were born via . Pt denies any fever or chills. Pt denies any injury or trama. Pt denies a history of a cholecystectomy. There are no other acute medical concerns at this time. Social hx: Cleans houses    Note written by Eniram. Carolin Blackwood, as dictated by Shaka Whitman MD 11:23 AM      The history is provided by the patient. No  was used. Past Medical History:   Diagnosis Date    Abnormal Pap smear     Routine gynecological examination     [facility]       Past Surgical History:   Procedure Laterality Date    HX GYN      HX TONSILLECTOMY           Family History:   Problem Relation Age of Onset    Hypertension Father        Social History     Social History    Marital status:      Spouse name: N/A    Number of children: N/A    Years of education: N/A     Occupational History    Not on file. Social History Main Topics    Smoking status: Current Every Day Smoker     Packs/day: 1.00     Years: 6.00     Types: Cigarettes    Smokeless tobacco: Never Used    Alcohol use 6.0 oz/week     12 Cans of beer per week      Comment: varies    Drug use: Yes     Special: Marijuana    Sexual activity: Yes     Partners: Male     Birth control/ protection: Implant     Other Topics Concern    Not on file     Social History Narrative    ** Merged History Encounter **              ALLERGIES: Review of patient's allergies indicates no known allergies.     Review of Systems   Constitutional: Negative for activity change, diaphoresis, fatigue and fever. HENT: Negative for congestion and sore throat. Eyes: Negative for photophobia and visual disturbance. Respiratory: Negative for chest tightness and shortness of breath. Cardiovascular: Negative for chest pain, palpitations and leg swelling. Gastrointestinal: Negative for abdominal pain, blood in stool, constipation, diarrhea, nausea and vomiting. Genitourinary: Negative for difficulty urinating, dysuria, flank pain, frequency and hematuria. Musculoskeletal: Positive for arthralgias, back pain and myalgias. Neurological: Negative for dizziness, syncope, numbness and headaches. All other systems reviewed and are negative. Vitals:    12/05/17 1104   BP: 120/74   Pulse: 80   Resp: 18   Temp: 97.7 °F (36.5 °C)   SpO2: 100%   Weight: 76.7 kg (169 lb)   Height: 5' 8\" (1.727 m)            Physical Exam   Constitutional: She is oriented to person, place, and time. She appears well-developed and well-nourished. No distress. HENT:   Head: Normocephalic and atraumatic. Nose: Nose normal.   Mouth/Throat: Oropharynx is clear and moist. No oropharyngeal exudate. Eyes: Conjunctivae and EOM are normal. Right eye exhibits no discharge. Left eye exhibits no discharge. No scleral icterus. Neck: Normal range of motion. Neck supple. No JVD present. No tracheal deviation present. No thyromegaly present. Cardiovascular: Normal rate, regular rhythm, normal heart sounds and intact distal pulses. Exam reveals no gallop and no friction rub. No murmur heard. Pulmonary/Chest: Effort normal and breath sounds normal. No stridor. No respiratory distress. She has no wheezes. She has no rales. She exhibits no tenderness. Abdominal: Bowel sounds are normal. She exhibits no distension and no mass. There is no tenderness. There is no rebound. Genitourinary:   Genitourinary Comments: Gravid uterus. Musculoskeletal: Normal range of motion. She exhibits no edema.         Right shoulder: She exhibits tenderness. Right shoulder tenderness with abduction, flexion, and extension. Lymphadenopathy:     She has no cervical adenopathy. Neurological: She is alert and oriented to person, place, and time. No cranial nerve deficit. Coordination normal.   Skin: Skin is warm and dry. No rash noted. She is not diaphoretic. No erythema. No pallor. Psychiatric: She has a normal mood and affect. Her behavior is normal. Judgment and thought content normal.   Nursing note and vitals reviewed. Note written by Sherlon Barnacle A. Tor Dubin, as dictated by Rich Galeano MD 11:23 AM      MDM  Number of Diagnoses or Management Options  Pain in joint of right shoulder:      Amount and/or Complexity of Data Reviewed  Clinical lab tests: ordered and reviewed  Tests in the radiology section of CPT®: ordered and reviewed  Review and summarize past medical records: yes  Independent visualization of images, tracings, or specimens: yes    Risk of Complications, Morbidity, and/or Mortality  Presenting problems: moderate  Diagnostic procedures: moderate  Management options: moderate    Patient Progress  Patient progress: stable    ED Course       Procedures      7:25 AM  The patient has been reevaluated. The patient is ready for discharge. The patient's signs, symptoms, diagnosis, and discharge instructions have been discussed and the patient/ family has conveyed their understanding. The patient is to follow up as recommended or return to the ED should their symptoms worsen. Plan has been discussed and the patient is in agreement. LABORATORY TESTS:  No results found for this or any previous visit (from the past 12 hour(s)). IMAGING RESULTS:  US ABD LTD   Final Result        No results found. MEDICATIONS GIVEN:  Medications   acetaminophen (TYLENOL) tablet 650 mg (650 mg Oral Given 12/5/17 1145)       IMPRESSION:  1. Pain in joint of right shoulder        PLAN:  1.    Discharge Medication List as of 12/5/2017 3:35 PM        2.    Follow-up Information     Follow up With Details Comments Contact Info    [address] DEP  If symptoms worsen Tashia Duke [address] [facility]    Catrachita Ambrosoi MD  As needed [facility] [address]  [place] [address] 21               Return to ED for new or worsening symptoms       Allan Ivory MD

## 2017-12-05 NOTE — ED NOTES
3:00 PM  Change of shift. Care of patient taken over from [facility] to  ; H&P reviewed, handoff complete. Awaiting labs/imaging/consultant. 3:34 PM  Urine is normal. Will discharge home with symptomatic treatment.  F/U with PCP

## 2017-12-05 NOTE — DISCHARGE INSTRUCTIONS
Joint Pain: Care Instructions  Your Care Instructions    Many people have small aches and pains from overuse or injury to muscles and joints. Joint injuries often happen during sports or recreation, work tasks, or projects around the home. An overuse injury can happen when you put too much stress on a joint or when you do an activity that stresses the joint over and over, such as using the computer or rowing a boat. You can take action at home to help your muscles and joints get better. You should feel better in 1 to 2 weeks, but it can take 3 months or more to heal completely. Follow-up care is a key part of your treatment and safety. Be sure to make and go to all appointments, and call your doctor if you are having problems. It's also a good idea to know your test results and keep a list of the medicines you take. How can you care for yourself at home? · Do not put weight on the injured joint for at least a day or two. · For the first day or two after an injury, do not take hot showers or baths, and do not use hot packs. The heat could make swelling worse. · Put ice or a cold pack on the sore joint for 10 to 20 minutes at a time. Try to do this every 1 to 2 hours for the next 3 days (when you are awake) or until the swelling goes down. Put a thin cloth between the ice and your skin. · Wrap the injury in an elastic bandage. Do not wrap it too tightly because this can cause more swelling. · Prop up the sore joint on a pillow when you ice it or anytime you sit or lie down during the next 3 days. Try to keep it above the level of your heart. This will help reduce swelling. · Take an over-the-counter pain medicine, such as acetaminophen (Tylenol), ibuprofen (Advil, Motrin), or naproxen (Aleve). Read and follow all instructions on the label. · After 1 or 2 days of rest, begin moving the joint gently.  While the joint is still healing, you can begin to exercise using activities that do not strain or hurt the painful joint. When should you call for help? Call your doctor now or seek immediate medical care if:  ? · You have signs of infection, such as:  ¨ Increased pain, swelling, warmth, and redness. ¨ Red streaks leading from the joint. ¨ A fever. ? Watch closely for changes in your health, and be sure to contact your doctor if:  ? · Your movement or symptoms are not getting better after 1 to 2 weeks of home treatment. Where can you learn more? Go to http://lakshmi-alonso.info/. Enter P205 in the search box to learn more about \"Joint Pain: Care Instructions. \"  Current as of: March 21, 2017  Content Version: 11.4  © 0687-4613 [employer]. Care instructions adapted under license by [employer] (which disclaims liability or warranty for this information). If you have questions about a medical condition or this instruction, always ask your healthcare professional. [address] any warranty or liability for your use of this information.

## 2017-12-05 NOTE — Clinical Note
Home to use cool compresses to the shoulder four times a day for 20 minutes at a time. After two days, switch to moist heat. Avoid strenuous activity with the arm for the next 3-4 days. See your own MD if continued difficulty.

## 2018-03-25 ENCOUNTER — HOSPITAL ENCOUNTER (EMERGENCY)
Age: 28
Discharge: HOME | End: 2018-03-25
Attending: EMERGENCY MEDICINE | Admitting: EMERGENCY MEDICINE
Payer: MEDICAID

## 2018-03-25 VITALS
OXYGEN SATURATION: 99 % | BODY MASS INDEX: 21.12 KG/M2 | TEMPERATURE: 98.1 F | SYSTOLIC BLOOD PRESSURE: 151 MMHG | RESPIRATION RATE: 12 BRPM | HEIGHT: 68 IN | DIASTOLIC BLOOD PRESSURE: 93 MMHG | WEIGHT: 139.33 LBS | HEART RATE: 67 BPM

## 2018-03-25 DIAGNOSIS — K04.7 DENTAL ABSCESS: Primary | ICD-10-CM

## 2018-03-25 DIAGNOSIS — F17.200 NICOTINE DEPENDENCE, UNCOMPLICATED, UNSPECIFIED NICOTINE PRODUCT TYPE: ICD-10-CM

## 2018-03-25 PROCEDURE — 74011250637 HC RX REV CODE- 250/637: Performed by: PHYSICIAN ASSISTANT

## 2018-03-25 PROCEDURE — 74011000250 HC RX REV CODE- 250: Performed by: PHYSICIAN ASSISTANT

## 2018-03-25 PROCEDURE — 75810000283 HC INJECTION NERVE BLOCK

## 2018-03-25 PROCEDURE — 99283 EMERGENCY DEPT VISIT LOW MDM: CPT

## 2018-03-25 RX ORDER — NAPROXEN 250 MG/1
500 TABLET ORAL ONCE
Status: COMPLETED | OUTPATIENT
Start: 2018-03-25 | End: 2018-03-25

## 2018-03-25 RX ORDER — HYDROCODONE BITARTRATE AND ACETAMINOPHEN 5; 325 MG/1; MG/1
1 TABLET ORAL
Status: COMPLETED | OUTPATIENT
Start: 2018-03-25 | End: 2018-03-25

## 2018-03-25 RX ORDER — AMOXICILLIN AND CLAVULANATE POTASSIUM 875; 125 MG/1; MG/1
1 TABLET, FILM COATED ORAL 2 TIMES DAILY
Qty: 20 TAB | Refills: 0 | Status: SHIPPED | OUTPATIENT
Start: 2018-03-25 | End: 2018-04-04

## 2018-03-25 RX ORDER — AMOXICILLIN AND CLAVULANATE POTASSIUM 875; 125 MG/1; MG/1
1 TABLET, FILM COATED ORAL 2 TIMES DAILY
Qty: 19 TAB | Refills: 0 | Status: SHIPPED | OUTPATIENT
Start: 2018-03-25 | End: 2018-03-25

## 2018-03-25 RX ORDER — AMOXICILLIN AND CLAVULANATE POTASSIUM 875; 125 MG/1; MG/1
1 TABLET, FILM COATED ORAL
Status: COMPLETED | OUTPATIENT
Start: 2018-03-25 | End: 2018-03-25

## 2018-03-25 RX ORDER — HYDROCODONE BITARTRATE AND ACETAMINOPHEN 5; 325 MG/1; MG/1
1 TABLET ORAL
Qty: 10 TAB | Refills: 0 | Status: SHIPPED | OUTPATIENT
Start: 2018-03-25 | End: 2019-05-26

## 2018-03-25 RX ORDER — NAPROXEN 500 MG/1
500 TABLET ORAL 2 TIMES DAILY WITH MEALS
Qty: 20 TAB | Refills: 0 | Status: SHIPPED | OUTPATIENT
Start: 2018-03-25 | End: 2018-04-04

## 2018-03-25 RX ADMIN — NAPROXEN 500 MG: 250 TABLET ORAL at 14:27

## 2018-03-25 RX ADMIN — HYDROCODONE BITARTRATE AND ACETAMINOPHEN 1 TABLET: 5; 325 TABLET ORAL at 14:27

## 2018-03-25 RX ADMIN — AMOXICILLIN AND CLAVULANATE POTASSIUM 1 TABLET: 875; 125 TABLET, FILM COATED ORAL at 14:27

## 2018-03-25 RX ADMIN — BENZOCAINE, BUTAMBEN, AND TETRACAINE HYDROCHLORIDE: .028; .004; .004 AEROSOL, SPRAY TOPICAL at 14:29

## 2018-03-25 NOTE — DISCHARGE INSTRUCTIONS
Abscessed Tooth: Care Instructions  Your Care Instructions    An abscessed tooth is a tooth that has a pocket of pus in the tissues around it. Pus forms when the body tries to fight an infection caused by bacteria. If the pus cannot drain, it forms an abscess. An abscessed tooth can cause red, swollen gums and throbbing pain, especially when you chew. You may have a bad taste in your mouth and a fever, and your jaw may swell. Damage to the tooth, untreated tooth decay, or gum disease can cause an abscessed tooth. An abscessed tooth needs to be treated by a dental professional right away. If it is not treated, the infection could spread to other parts of your body. Your dentist will give you antibiotics to stop the infection. He or she may make a hole in the tooth or cut open (agustín) the abscess inside your mouth so that the infection can drain, which should relieve your pain. You may need to have a root canal treatment, which tries to save your tooth by taking out the infected pulp and replacing it with a healing medicine and/or a filling. If these treatments do not work, your tooth may have to be removed. Follow-up care is a key part of your treatment and safety. Be sure to make and go to all appointments, and call your doctor if you are having problems. It's also a good idea to know your test results and keep a list of the medicines you take. How can you care for yourself at home? · Reduce pain and swelling in your face and jaw by putting ice or a cold pack on the outside of your cheek for 10 to 20 minutes at a time. Put a thin cloth between the ice and your skin. · Take pain medicines exactly as directed. ¨ If the doctor gave you a prescription medicine for pain, take it as prescribed. ¨ If you are not taking a prescription pain medicine, ask your doctor if you can take an over-the-counter medicine. · Take your antibiotics as directed. Do not stop taking them just because you feel better.  You need to take the full course of antibiotics. To prevent tooth abscess  · Brush and floss every day, and have regular dental checkups. · Eat a healthy diet, and avoid sugary foods and drinks. · Do not smoke, use e-cigarettes with nicotine, or use spit tobacco. Tobacco and nicotine slow your ability to heal. Tobacco also increases your risk for gum disease and cancer of the mouth and throat. If you need help quitting, talk to your doctor about stop-smoking programs and medicines. These can increase your chances of quitting for good. When should you call for help? Call 911 anytime you think you may need emergency care. For example, call if:  ? · You have trouble breathing. ?Call your doctor now or seek immediate medical care if:  ? · You have new or worse symptoms of infection, such as:  ¨ Increased pain, swelling, warmth, or redness. ¨ Red streaks leading from the area. ¨ Pus draining from the area. ¨ A fever. ? Watch closely for changes in your health, and be sure to contact your doctor if:  ? · You do not get better as expected. Where can you learn more? Go to http://lakshmi-alonso.info/. Enter M492 in the search box to learn more about \"Abscessed Tooth: Care Instructions. \"  Current as of: May 12, 2017  Content Version: 11.4  © 6272-8734 [employer]. Care instructions adapted under license by [employer] (which disclaims liability or warranty for this information). If you have questions about a medical condition or this instruction, always ask your healthcare professional. [place] [address] any warranty or liability for your use of this information.

## 2018-03-25 NOTE — ED PROVIDER NOTES
EMERGENCY DEPARTMENT HISTORY AND PHYSICAL EXAM      Date: 3/25/2018  Patient Name: Diann Lomax    History of Presenting Illness     Chief Complaint   Patient presents with    Dental Pain     Pt presents to ED for dental pain to SHIRA dhaliwal x yesterday, recently had a root canal on the tooth x 2 years ago        History Provided By: Patient    HPI: Diann Lomax, 32 y.o. female with no pertinent PMHx, presents ambulatory to the ED with cc of an acute onset of throbbing right sided dental pain progressively worsening since yesterday. The pt reports associated sx of right sided facial swelling as well. She expresses that she had a root canal ~2 years ago on the affected tooth noting she followed up with her dentist ~1 month ago and was informed that the root canal failed. The pt discloses that she was placed on amoxicillin at that time but was unable to fill the prescription. She reports that her pain and swelling returned yesterday WNR leading her to the ED. She denies any fevers, chills, chest pain, SOB, abdominal pain, nausea, vomiting, or diarrhea. PCP: None   SHx: (+) Tobacco; (+) ETOH; (+) Illicit drug use: Marijuana    There are no other complaints, changes, or physical findings at this time. Current Outpatient Prescriptions   Medication Sig Dispense Refill    FLUoxetine (PROZAC) 20 mg capsule Take 1 Cap by mouth daily. Indications: Generalized Anxiety Disorder, major depressive disorder 15 Cap 1    PRENATAL VIT CALC,IRON,FOLIC (PRENATAL VITAMIN PO) Take  by mouth.  albuterol (PROVENTIL HFA, VENTOLIN HFA) 90 mcg/actuation inhaler Take 2 Puffs by inhalation every four (4) hours as needed for Wheezing.  1 Inhaler 2       Past History     Past Medical History:  Past Medical History:   Diagnosis Date    Abnormal Pap smear     Routine gynecological examination     [facility]       Past Surgical History:  Past Surgical History:   Procedure Laterality Date    HX GYN      HX TONSILLECTOMY Family History:  Family History   Problem Relation Age of Onset    Hypertension Father        Social History:  Social History   Substance Use Topics    Smoking status: Current Every Day Smoker     Packs/day: 1.00     Years: 6.00     Types: Cigarettes    Smokeless tobacco: Never Used    Alcohol use 6.0 oz/week     12 Cans of beer per week      Comment: varies       Allergies:  No Known Allergies      Review of Systems   Review of Systems   Constitutional: Negative. Negative for chills and fever. HENT: Positive for dental problem (right lower ) and facial swelling (right sided). Negative for rhinorrhea and sore throat. Eyes: Negative. Negative for visual disturbance. Respiratory: Negative. Negative for cough, chest tightness, shortness of breath and wheezing. Cardiovascular: Negative. Negative for chest pain and palpitations. Gastrointestinal: Negative. Negative for abdominal pain, constipation, diarrhea, nausea and vomiting. Genitourinary: Negative. Negative for dysuria and hematuria. Musculoskeletal: Negative. Negative for arthralgias and myalgias. Skin: Negative. Negative for rash. Allergic/Immunologic: Negative. Negative for environmental allergies and food allergies. Neurological: Negative. Negative for headaches. Psychiatric/Behavioral: Negative. Negative for suicidal ideas. Physical Exam   Physical Exam   Constitutional: She is oriented to person, place, and time. She appears well-developed and well-nourished. No distress. Pt is a  F, awake and alert in mild distress secondary to pain. HENT:   Head: Normocephalic and atraumatic.    Right Ear: Tympanic membrane, external ear and ear canal normal.   Left Ear: Tympanic membrane, external ear and ear canal normal.   Nose: Nose normal.   Mouth/Throat: Uvula is midline, oropharynx is clear and moist and mucous membranes are normal.   Mild facial edema to the right maxilla, no surrounding erythema, edema or fluctuance. Edema does not cross mandibular border. Tenderness to tooth number 29 with crown in place, no obvious signs of decay  No trismus    Eyes: Conjunctivae and EOM are normal. Pupils are equal, round, and reactive to light. Right eye exhibits no discharge. Left eye exhibits no discharge. Neck: Normal range of motion. Cardiovascular: Normal rate and normal heart sounds. Pulmonary/Chest: Effort normal and breath sounds normal. No respiratory distress. She has no wheezes. She has no rales. Abdominal: Soft. Bowel sounds are normal. There is no tenderness. There is no guarding. No CVA tenderness b/l. Musculoskeletal: Normal range of motion. She exhibits no edema or tenderness. Lymphadenopathy:     She has no cervical adenopathy. Neurological: She is alert and oriented to person, place, and time. Coordination normal.   No focal neuro deficits. Skin: Skin is warm and dry. No rash noted. She is not diaphoretic. No erythema. No pallor. Psychiatric: She has a normal mood and affect. Her behavior is normal.   Vitals reviewed. Diagnostic Study Results     Medical Decision Making   I am the first provider for this patient. I reviewed the vital signs, available nursing notes, past medical history, past surgical history, family history and social history. Vital Signs-Reviewed the patient's vital signs. Patient Vitals for the past 12 hrs:   Temp Pulse Resp BP SpO2   03/25/18 1346 98.1 °F (36.7 °C) 67 12 (!) 151/93 99 %       Pulse Oximetry Analysis - 99% on room air    Cardiac Monitor:   Rate: 67 bpm  Rhythm: Normal Sinus Rhythm        Records Reviewed: Nursing Notes, Old Medical Records, Previous Radiology Studies and Previous Laboratory Studies    Provider Notes (Medical Decision Making):     DDx: Dental abscess     ED Course:   Initial assessment performed.  The patients presenting problems have been discussed, and they are in agreement with the care plan formulated and outlined with them. I have encouraged them to ask questions as they arise throughout their visit. Progress Notes:    Procedure Note - Dental Block:    2:10 PM  Performed by Charles Machado PA-C. A inferior alveolar nerve block was performed on the right side. A 50/50 mix of 0.5 mL of 0.5% bupivicaine, 1% lidocaine and with epinephrine was injected. Total time at bedside, performing this procedure was 1-15 minutes. The procedure was tolerated well without any complications. 2:35 PM   The pt has been re-evaluated. Upon discharge the pt expresses that she found no relief with the dental block. Will re-dose the pt. Procedure Note - Dental Block:    2:35 PM  Performed by Charles Machado PA-C. A inferior alveolar nerve block was performed on the right side. A 50/50 mix of 0.5 mL of 0.5% bupivicaine, 1% lidocaine and with epinephrine was injected. Total time at bedside, performing this procedure was 1-15 minutes. The procedure was tolerated well without any complications. Critical Care Time: 0 minutes    Disposition:  Discharge Note:  2:29 PM  The patient is ready for discharge. The patient's signs, symptoms, diagnosis, and discharge instruction have been discussed and the patient has conveyed their understanding. The patient is to follow up as recommended or return to the ER should their symptoms worsen. Plan has been discussed and the patient is in agreement. Written by Ramesh Pool, as dictated by Charles Machado PA-C    PLAN:  1. Current Discharge Medication List      START taking these medications    Details   HYDROcodone-acetaminophen (NORCO) 5-325 mg per tablet Take 1 Tab by mouth every six (6) hours as needed for Pain. Max Daily Amount: 4 Tabs. Qty: 10 Tab, Refills: 0    Associated Diagnoses: Dental abscess      naproxen (NAPROSYN) 500 mg tablet Take 1 Tab by mouth two (2) times daily (with meals) for 10 days.   Qty: 20 Tab, Refills: 0      amoxicillin-clavulanate (AUGMENTIN) 875-125 mg per tablet Take 1 Tab by mouth two (2) times a day for 10 days. Qty: 19 Tab, Refills: 0           2. Follow-up Information     Follow up With Details Comments Contact Info    [facility] Schedule an appointment as soon as possible for a visit asap [address] [place]  [phone]    [facility] EMERGENCY DEPT  As needed or, If symptoms worsen [address] [place] [address]  [phone]        Return to ED if worse     Diagnosis     Clinical Impression:   1. Dental abscess    2. Nicotine dependence, uncomplicated, unspecified nicotine product type        Attestations:    Attestation: This note is prepared by Jesse Andrews, acting as Scribe for [employer]. Artie Jorge PA-C: The scribe's documentation has been prepared under my direction and personally reviewed by me in its entirety. I confirm that the note above accurately reflects all work, treatment, procedures, and medical decision making performed by me. This note will not be viewable in [address]

## 2019-05-26 ENCOUNTER — APPOINTMENT (OUTPATIENT)
Dept: CT IMAGING | Age: 29
End: 2019-05-26
Attending: PHYSICIAN ASSISTANT
Payer: MEDICAID

## 2019-05-26 ENCOUNTER — HOSPITAL ENCOUNTER (EMERGENCY)
Age: 29
Discharge: HOME | End: 2019-05-26
Attending: EMERGENCY MEDICINE
Payer: MEDICAID

## 2019-05-26 VITALS
BODY MASS INDEX: 26.09 KG/M2 | TEMPERATURE: 98.9 F | RESPIRATION RATE: 18 BRPM | HEART RATE: 110 BPM | OXYGEN SATURATION: 99 % | HEIGHT: 63 IN | SYSTOLIC BLOOD PRESSURE: 158 MMHG | WEIGHT: 147.27 LBS | DIASTOLIC BLOOD PRESSURE: 103 MMHG

## 2019-05-26 DIAGNOSIS — T88.8XXA FLUID COLLECTION AT SURGICAL SITE, INITIAL ENCOUNTER: Primary | ICD-10-CM

## 2019-05-26 LAB
ALBUMIN SERPL-MCNC: 3.8 G/DL (ref 3.5–5)
ALBUMIN/GLOB SERPL: 1.2 {RATIO} (ref 1.1–2.2)
ALP SERPL-CCNC: 75 U/L (ref 45–117)
ALT SERPL-CCNC: 19 U/L (ref 12–78)
ANION GAP SERPL CALC-SCNC: 7 MMOL/L (ref 5–15)
AST SERPL-CCNC: 13 U/L (ref 15–37)
BASOPHILS # BLD: 0.1 K/UL (ref 0–0.1)
BASOPHILS NFR BLD: 1 % (ref 0–1)
BILIRUB SERPL-MCNC: 0.3 MG/DL (ref 0.2–1)
BUN SERPL-MCNC: 16 MG/DL (ref 6–20)
BUN/CREAT SERPL: 17 (ref 12–20)
CALCIUM SERPL-MCNC: 8.7 MG/DL (ref 8.5–10.1)
CHLORIDE SERPL-SCNC: 106 MMOL/L (ref 97–108)
CO2 SERPL-SCNC: 25 MMOL/L (ref 21–32)
CREAT SERPL-MCNC: 0.93 MG/DL (ref 0.55–1.02)
DIFFERENTIAL METHOD BLD: ABNORMAL
EOSINOPHIL # BLD: 0.2 K/UL (ref 0–0.4)
EOSINOPHIL NFR BLD: 2 % (ref 0–7)
ERYTHROCYTE [DISTWIDTH] IN BLOOD BY AUTOMATED COUNT: 13.7 % (ref 11.5–14.5)
GLOBULIN SER CALC-MCNC: 3.3 G/DL (ref 2–4)
GLUCOSE SERPL-MCNC: 134 MG/DL (ref 65–100)
HCG UR QL: NEGATIVE
HCT VFR BLD AUTO: 39.6 % (ref 35–47)
HGB BLD-MCNC: 13.2 G/DL (ref 11.5–16)
IMM GRANULOCYTES # BLD AUTO: 0 K/UL (ref 0–0.04)
IMM GRANULOCYTES NFR BLD AUTO: 0 % (ref 0–0.5)
LYMPHOCYTES # BLD: 3 K/UL (ref 0.8–3.5)
LYMPHOCYTES NFR BLD: 29 % (ref 12–49)
MCH RBC QN AUTO: 29.7 PG (ref 26–34)
MCHC RBC AUTO-ENTMCNC: 33.3 G/DL (ref 30–36.5)
MCV RBC AUTO: 89.2 FL (ref 80–99)
MONOCYTES # BLD: 0.4 K/UL (ref 0–1)
MONOCYTES NFR BLD: 4 % (ref 5–13)
NEUTS SEG # BLD: 6.5 K/UL (ref 1.8–8)
NEUTS SEG NFR BLD: 64 % (ref 32–75)
NRBC # BLD: 0 K/UL (ref 0–0.01)
NRBC BLD-RTO: 0 PER 100 WBC
PLATELET # BLD AUTO: 219 K/UL (ref 150–400)
PMV BLD AUTO: 10.1 FL (ref 8.9–12.9)
POTASSIUM SERPL-SCNC: 3.6 MMOL/L (ref 3.5–5.1)
PROT SERPL-MCNC: 7.1 G/DL (ref 6.4–8.2)
RBC # BLD AUTO: 4.44 M/UL (ref 3.8–5.2)
SODIUM SERPL-SCNC: 138 MMOL/L (ref 136–145)
WBC # BLD AUTO: 10.3 K/UL (ref 3.6–11)

## 2019-05-26 PROCEDURE — 85025 COMPLETE CBC W/AUTO DIFF WBC: CPT

## 2019-05-26 PROCEDURE — 99284 EMERGENCY DEPT VISIT MOD MDM: CPT

## 2019-05-26 PROCEDURE — 74011250637 HC RX REV CODE- 250/637: Performed by: PHYSICIAN ASSISTANT

## 2019-05-26 PROCEDURE — 74177 CT ABD & PELVIS W/CONTRAST: CPT

## 2019-05-26 PROCEDURE — 74011636320 HC RX REV CODE- 636/320: Performed by: EMERGENCY MEDICINE

## 2019-05-26 PROCEDURE — 80053 COMPREHEN METABOLIC PANEL: CPT

## 2019-05-26 PROCEDURE — 36415 COLL VENOUS BLD VENIPUNCTURE: CPT

## 2019-05-26 PROCEDURE — 81025 URINE PREGNANCY TEST: CPT

## 2019-05-26 RX ORDER — LEVONORGESTREL/ETHINYL ESTRADIOL AND ETHINYL ESTRADIOL 100-20(84)
KIT ORAL
COMMUNITY
Start: 2019-03-16 | End: 2020-10-09

## 2019-05-26 RX ORDER — HYDROCODONE BITARTRATE AND ACETAMINOPHEN 5; 325 MG/1; MG/1
1 TABLET ORAL
Status: COMPLETED | OUTPATIENT
Start: 2019-05-26 | End: 2019-05-26

## 2019-05-26 RX ORDER — HYDROCODONE BITARTRATE AND ACETAMINOPHEN 5; 325 MG/1; MG/1
1 TABLET ORAL
Qty: 12 TAB | Refills: 0 | Status: SHIPPED | OUTPATIENT
Start: 2019-05-26 | End: 2019-05-29

## 2019-05-26 RX ORDER — SODIUM CHLORIDE 0.9 % (FLUSH) 0.9 %
10 SYRINGE (ML) INJECTION
Status: COMPLETED | OUTPATIENT
Start: 2019-05-26 | End: 2019-05-26

## 2019-05-26 RX ADMIN — IOHEXOL 50 ML: 240 INJECTION, SOLUTION INTRATHECAL; INTRAVASCULAR; INTRAVENOUS; ORAL at 16:11

## 2019-05-26 RX ADMIN — HYDROCODONE BITARTRATE AND ACETAMINOPHEN 1 TABLET: 5; 325 TABLET ORAL at 16:21

## 2019-05-26 RX ADMIN — IOPAMIDOL 100 ML: 755 INJECTION, SOLUTION INTRAVENOUS at 15:55

## 2019-05-26 RX ADMIN — Medication 10 ML: at 15:55

## 2019-05-26 NOTE — ED PROVIDER NOTES
EMERGENCY DEPARTMENT HISTORY AND PHYSICAL EXAM      Date: 5/26/2019  Patient Name: Darrell Tolbert    History of Presenting Illness     Chief Complaint   Patient presents with    Surgical Follow-up     per pt she had an umbilcal hernia repair two weeks ago and had to have blood drained from it Friday: complains of pain        History Provided By: Patient    HPI: Darrell Tolbert, 29 y.o. female with no pertinent past medical history, presents to the ED with cc of gildardo-umbilical swelling after surgery. The patient had an umbilical hernia repair 2 weeks ago. She has been doing well up until a week ago, when she began having swelling and increased pain around her umbilicus. She was seen at her surgeon's office 3 days ago and they said the swelling was due to fluid buildup. They drained blood from the area using a needle at that time. She was initially feeling better but then the fluid reaccumulated the next day. She has tried taking over-the-counter pain medications without relief. She denies fever, nausea, vomiting, diarrhea, constipation, urinary symptoms. There are no other complaints, changes, or physical findings at this time. PCP: None   Surgeon: Dr. Tato Noonan at [facility]    No current facility-administered medications on file prior to encounter. Current Outpatient Medications on File Prior to Encounter   Medication Sig Dispense Refill    CAMRESE LO 0.10 mg-20 mcg (84)/10 mcg (7) 3MPk       FLUoxetine (PROZAC) 20 mg capsule Take 1 Cap by mouth daily. Indications: Generalized Anxiety Disorder, major depressive disorder 15 Cap 1    albuterol (PROVENTIL HFA, VENTOLIN HFA) 90 mcg/actuation inhaler Take 2 Puffs by inhalation every four (4) hours as needed for Wheezing.  1 Inhaler 2       Past History     Past Medical History:  Past Medical History:   Diagnosis Date    Abnormal Pap smear     Routine gynecological examination     [facility]       Past Surgical History:  Past Surgical History:   Procedure Laterality Date    HX GYN      HX TONSILLECTOMY         Family History:  Family History   Problem Relation Age of Onset    Hypertension Father        Social History:  Social History     Tobacco Use    Smoking status: Current Every Day Smoker     Packs/day: 1.00     Years: 6.00     Pack years: 6.00     Types: Cigarettes    Smokeless tobacco: Never Used   Substance Use Topics    Alcohol use: Yes     Alcohol/week: 6.0 oz     Types: 12 Cans of beer per week     Comment: varies    Drug use: Yes     Types: Marijuana       Allergies:  No Known Allergies      Review of Systems   Review of Systems   Constitutional: Negative for chills and fever. HENT: Negative for ear pain and sore throat. Eyes: Negative for redness and visual disturbance. Respiratory: Negative for cough and shortness of breath. Cardiovascular: Negative for chest pain and palpitations. Gastrointestinal: Positive for abdominal pain. Negative for nausea and vomiting. Genitourinary: Negative for dysuria and hematuria. Musculoskeletal: Negative for back pain and gait problem. Skin: Negative for rash and wound. Neurological: Negative for dizziness and headaches. Psychiatric/Behavioral: Negative for behavioral problems and confusion. All other systems reviewed and are negative. Physical Exam   Physical Exam   Constitutional: She is oriented to person, place, and time. She appears well-developed and well-nourished. No distress. HENT:   Head: Normocephalic and atraumatic. Eyes: Pupils are equal, round, and reactive to light. Conjunctivae and EOM are normal.   Neck: Normal range of motion. Neck supple. Cardiovascular: Normal rate, regular rhythm and normal heart sounds. Pulmonary/Chest: Effort normal and breath sounds normal. No respiratory distress. She has no wheezes. She has no rales. Abdominal:   Abdomen is soft.   There is swelling and fluctuance periumbilically with mild tenderness to palpation over this area. There is a well-healing surgical scar inferior to the umbilicus approximately 6 cm in length. The abdomen is nontender is all other quadrants. Musculoskeletal: Normal range of motion. Neurological: She is alert and oriented to person, place, and time. She has normal strength. No cranial nerve deficit or sensory deficit. GCS eye subscore is 4. GCS verbal subscore is 5. GCS motor subscore is 6. Skin: Skin is warm and dry. No rash noted. Psychiatric: She has a normal mood and affect. Her behavior is normal.   Nursing note and vitals reviewed. Diagnostic Study Results     Labs -     Recent Results (from the past 12 hour(s))   CBC WITH AUTOMATED DIFF    Collection Time: 05/26/19  4:30 PM   Result Value Ref Range    WBC 10.3 3.6 - 11.0 K/uL    RBC 4.44 3.80 - 5.20 M/uL    HGB 13.2 11.5 - 16.0 g/dL    HCT 39.6 35.0 - 47.0 %    MCV 89.2 80.0 - 99.0 FL    MCH 29.7 26.0 - 34.0 PG    MCHC 33.3 30.0 - 36.5 g/dL    RDW 13.7 11.5 - 14.5 %    PLATELET 706 112 - 518 K/uL    MPV 10.1 8.9 - 12.9 FL    NRBC 0.0 0  WBC    ABSOLUTE NRBC 0.00 0.00 - 0.01 K/uL    NEUTROPHILS 64 32 - 75 %    LYMPHOCYTES 29 12 - 49 %    MONOCYTES 4 (L) 5 - 13 %    EOSINOPHILS 2 0 - 7 %    BASOPHILS 1 0 - 1 %    IMMATURE GRANULOCYTES 0 0.0 - 0.5 %    ABS. NEUTROPHILS 6.5 1.8 - 8.0 K/UL    ABS. LYMPHOCYTES 3.0 0.8 - 3.5 K/UL    ABS. MONOCYTES 0.4 0.0 - 1.0 K/UL    ABS. EOSINOPHILS 0.2 0.0 - 0.4 K/UL    ABS. BASOPHILS 0.1 0.0 - 0.1 K/UL    ABS. IMM.  GRANS. 0.0 0.00 - 0.04 K/UL    DF AUTOMATED     METABOLIC PANEL, COMPREHENSIVE    Collection Time: 05/26/19  4:30 PM   Result Value Ref Range    Sodium 138 136 - 145 mmol/L    Potassium 3.6 3.5 - 5.1 mmol/L    Chloride 106 97 - 108 mmol/L    CO2 25 21 - 32 mmol/L    Anion gap 7 5 - 15 mmol/L    Glucose 134 (H) 65 - 100 mg/dL    BUN 16 6 - 20 MG/DL    Creatinine 0.93 0.55 - 1.02 MG/DL    BUN/Creatinine ratio 17 12 - 20      GFR est AA >60 >60 ml/min/1.73m2    GFR est non-AA >60 >60 ml/min/1.73m2    Calcium 8.7 8.5 - 10.1 MG/DL    Bilirubin, total 0.3 0.2 - 1.0 MG/DL    ALT (SGPT) 19 12 - 78 U/L    AST (SGOT) 13 (L) 15 - 37 U/L    Alk. phosphatase 75 45 - 117 U/L    Protein, total 7.1 6.4 - 8.2 g/dL    Albumin 3.8 3.5 - 5.0 g/dL    Globulin 3.3 2.0 - 4.0 g/dL    A-G Ratio 1.2 1.1 - 2.2     HCG URINE, QL. - POC    Collection Time: 05/26/19  5:13 PM   Result Value Ref Range    Pregnancy test,urine (POC) NEGATIVE  NEG         Radiologic Studies -   CT ABD PELV W CONT   Final Result      1. Postoperative fluid collection in the subcutaneous fat of the anterior   abdominal wall at the umbilicus, at the site of recent umbilical hernia surgery. This fluid collection measures 4.7 x 2.3 x 6.0 cm, and is accompanied by a tiny   collection immediately posterior to the abdominal wall musculature at the same   level, measuring 0.6 x 0.6 x 1.8 cm. . There is no rim enhancement or soft tissue   thickening to suggest abscess. Correlate clinically and follow-up as   appropriate. CT Results  (Last 48 hours)               05/26/19 1725  CT ABD PELV W CONT Final result    Impression:      1. Postoperative fluid collection in the subcutaneous fat of the anterior   abdominal wall at the umbilicus, at the site of recent umbilical hernia surgery. This fluid collection measures 4.7 x 2.3 x 6.0 cm, and is accompanied by a tiny   collection immediately posterior to the abdominal wall musculature at the same   level, measuring 0.6 x 0.6 x 1.8 cm. . There is no rim enhancement or soft tissue   thickening to suggest abscess. Correlate clinically and follow-up as   appropriate. Narrative:  EXAM: CT ABD PELV W CONT       INDICATION: Post operative infection, abdomen pelvis; periumbilical swelling and   pain after umbilical hernia repair 2 weeks ago, evaluate for fluid collection       COMPARISON: None. CONTRAST: 100 mL of Isovue-370.        TECHNIQUE:    Following the uneventful intravenous administration of contrast, thin axial   images were obtained through the abdomen and pelvis. Coronal and sagittal   reconstructions were generated. Oral contrast was administered. CT dose   reduction was achieved through use of a standardized protocol tailored for this   examination and automatic exposure control for dose modulation. FINDINGS:    LUNG BASES: Clear. INCIDENTALLY IMAGED HEART AND MEDIASTINUM: Unremarkable. LIVER: No mass or biliary dilatation. GALLBLADDER: Unremarkable. SPLEEN: No mass. PANCREAS: No mass or ductal dilatation. ADRENALS: Unremarkable. KIDNEYS: No mass, calculus, or hydronephrosis. STOMACH: Unremarkable. SMALL BOWEL: No dilatation or wall thickening. COLON: No dilatation or wall thickening. APPENDIX: Unremarkable. PERITONEUM: No ascites or pneumoperitoneum. RETROPERITONEUM: No lymphadenopathy or aortic aneurysm. REPRODUCTIVE ORGANS: Unremarkable for age. URINARY BLADDER: No mass or calculus. BONES: No destructive bone lesion. ADDITIONAL COMMENTS: At the umbilicus, at the site of recent surgery, there is a   subcutaneous fluid collection between the skin in the anterior abdominal   musculature, measuring 4.7 x 2.3 x 6.0 centimeters, measuring water density. There is an additional tiny fluid collection measuring 0.6 x 0.6 by 1.8   centimeters, immediately posterior to the abdominal wall at the umbilicus. CXR Results  (Last 48 hours)    None            Medical Decision Making   I am the first provider for this patient. I reviewed the vital signs, available nursing notes, past medical history, past surgical history, family history and social history. Vital Signs-Reviewed the patient's vital signs.   Patient Vitals for the past 12 hrs:   Temp Pulse Resp BP SpO2   05/26/19 1521     99 %   05/26/19 1418 98.9 °F (37.2 °C) (!) 110 18 (!) 158/103 99 %         Records Reviewed: Nursing Notes and Old Medical Records    Provider Notes (Medical Decision Making):   Patient presents with periumbilical swelling and fluctuance after umbilical hernia repair 2 weeks ago. Differential diagnosis includes hematoma versus abscess versus fluid collection. Plan for CT for further evaluation. ED Course:   Initial assessment performed. The patients presenting problems have been discussed, and they are in agreement with the care plan formulated and outlined with them. I have encouraged them to ask questions as they arise throughout their visit. ED Course as of May 26 2336   Emily Emmanuel May 26, 2019   1718 Discussed case with Dr. West Montalvo, on-call for general surgery. He says this is a normal findings after a mesh hernia repair. Does not recommend draining at this time. [FLAVIA]      ED Course User Index  [FLAVIA] Alban Harmonleslie Mayurgiuliama         Disposition:  6:20 PM -    The patient has been re-evaluated and is ready for discharge. Reviewed available results with patient. Counseled patient on diagnosis and care plan. Patient has expressed understanding, and all questions have been answered. Patient agrees with plan and agrees to follow up as recommended, or to return to the ED if their symptoms worsen. Discharge instructions have been provided and explained to the patient, along with reasons to return to the ED. PLAN:  1. Discharge Medication List as of 5/26/2019  6:22 PM        2. Follow-up Information     Follow up With Specialties Details Why Contact Info    Khloe Miller MD General Surgery Call to schedule a follow up appointment on Tuesday [address] [address] [facility] [address]      [facility] EMERGENCY DEPT Emergency Medicine Go to If symptoms worsen [address] [place] [address]  [phone]        Return to ED if worse     Diagnosis     Clinical Impression:   1. Fluid collection at surgical site, initial encounter            Refugio Watkins.  VIKI Win

## 2019-05-26 NOTE — ED NOTES
Pt had umbilical hernia repair two weeks ago. She came in this past Thursday with periumbilical pain. She reports that dark red blood was drained from umbilicus with relief of pain. Today she comes back with periumbilical pain and pressure. She reports that it feels as it did before. Pt denies fever, chills, n/v.  Skin over umbilicus is not warmer to touch than surrounding skin. There is no redness or drainage at the site.

## 2019-05-26 NOTE — ED NOTES
DAISY Win PA-C reviewed discharge instructions with the patient. The patient verbalized understanding. All questions and concerns were addressed. The patient declined a wheelchair and is discharged ambulatory in the care of family members with instructions and prescriptions in hand. Pt is alert and oriented x 4. Respirations are clear and unlabored.

## 2019-05-26 NOTE — DISCHARGE INSTRUCTIONS
Patient Education        Abdominal Hernia Repair: What to Expect at Home  Your Recovery  After surgery to repair your hernia, you are likely to have pain for a few days. You may also feel like you have the flu, and you may have a low fever and feel tired and nauseated. This is common. You should feel better after a few days and will probably feel much better in 7 days. For several weeks you may feel twinges or pulling in the hernia repair when you move. You may have some bruising around the area of your hernia repair. This is normal.  This care sheet gives you a general idea about how long it will take for you to recover. But each person recovers at a different pace. Follow the steps below to get better as quickly as possible. How can you care for yourself at home? Activity    · Rest when you feel tired. Getting enough sleep will help you recover.     · Try to walk each day. Start by walking a little more than you did the day before. Bit by bit, increase the amount you walk. Walking boosts blood flow and helps prevent pneumonia and constipation.     · If your doctor gives you an abdominal binder to wear, use it as directed. This is an elastic bandage that wraps around your belly and upper hips. It helps support your belly muscles after surgery.     · Avoid strenuous activities, such as biking, jogging, weight lifting, or aerobic exercise, until your doctor says it is okay.     · Avoid lifting anything that would make you strain. This may include heavy grocery bags and milk containers, a heavy briefcase or backpack, cat litter or dog food bags, a vacuum , or a child.     · Ask your doctor when you can drive again.     · Most people are able to return to work within 1 to 2 weeks after surgery. But if your job requires that you to do heavy lifting or strenuous activity, you may need to take 4 to 6 weeks off from work.     · You may shower 24 to 48 hours after surgery, if your doctor okays it.  Pat the cut (incision) dry. Do not take a bath for the first 2 weeks, or until your doctor tells you it is okay.     · Ask your doctor when it is okay for you to have sex. Diet    · You can eat your normal diet. If your stomach is upset, try bland, low-fat foods like plain rice, broiled chicken, toast, and yogurt.     · Drink plenty of fluids (unless your doctor tells you not to).     · You may notice that your bowel movements are not regular right after your surgery. This is common. Avoid constipation and straining with bowel movements. You may want to take a fiber supplement every day. If you have not had a bowel movement after a couple of days, ask your doctor about taking a mild laxative. Medicines    · Your doctor will tell you if and when you can restart your medicines. He or she will also give you instructions about taking any new medicines.     · If you take blood thinners, such as warfarin (Coumadin), clopidogrel (Plavix), or aspirin, be sure to talk to your doctor. He or she will tell you if and when to start taking those medicines again. Make sure that you understand exactly what your doctor wants you to do.     · Be safe with medicines. Take pain medicines exactly as directed. ? If the doctor gave you a prescription medicine for pain, take it as prescribed. ? If you are not taking a prescription pain medicine, ask your doctor if you can take an over-the-counter medicine.     · If your doctor prescribed antibiotics, take them as directed. Do not stop taking them just because you feel better. You need to take the full course of antibiotics.     · If you think your pain medicine is making you sick to your stomach:  ? Take your medicine after meals (unless your doctor has told you not to). ? Ask your doctor for a different pain medicine. Incision care    · If you have strips of tape on the cut (incision) the doctor made, leave the tape on for a week or until it falls off.  Or follow your doctor's instructions for removing the tape.     · If you have staples closing the cut, you will need to visit your doctor in 1 to 2 weeks to have them removed.     · Wash the area daily with warm, soapy water, and pat it dry. Don't use hydrogen peroxide or alcohol, which can slow healing. You may cover the area with a gauze bandage if it weeps or rubs against clothing. Change the bandage every day. Other instructions    · Hold a pillow over your incision when you cough or take deep breaths. This will support your belly and decrease your pain.     · Do breathing exercises at home as instructed by your doctor. This will help prevent pneumonia.     · If you had laparoscopic surgery, you may also have pain in your left shoulder. The pain usually lasts about a day or two. Follow-up care is a key part of your treatment and safety. Be sure to make and go to all appointments, and call your doctor if you are having problems. It's also a good idea to know your test results and keep a list of the medicines you take. When should you call for help? Call 911 anytime you think you may need emergency care. For example, call if:    · You passed out (lost consciousness).     · You are short of breath.    Call your doctor now or seek immediate medical care if:    · You are sick to your stomach and cannot drink fluids.     · You have signs of a blood clot in your leg (called a deep vein thrombosis), such as:  ? Pain in your calf, back of the knee, thigh, or groin. ? Redness and swelling in your leg or groin.     · You have signs of infection, such as:  ? Increased pain, swelling, warmth, or redness. ? Red streaks leading from the incision. ? Pus draining from the incision. ?  A fever.     · You cannot pass stools or gas.     · You have pain that does not get better after you take pain medicine.     · You have loose stitches, or your incision comes open.     · Bright red blood has soaked through the bandage over your incision.    Watch closely for changes in your health, and be sure to contact your doctor if you have any problems. Where can you learn more? Go to http://lakshmi-alonso.info/. Enter B577 in the search box to learn more about \"Abdominal Hernia Repair: What to Expect at Home. \"  Current as of: March 27, 2018  Content Version: 11.9  © 9772-0238 [employer]. Care instructions adapted under license by [employer] (which disclaims liability or warranty for this information). If you have questions about a medical condition or this instruction, always ask your healthcare professional. [place] [address] any warranty or liability for your use of this information. CT ABD PELV W CONT (Final result)   Result time 05/26/19 17:54:08   Final result by Bob Ponce MD (05/26/19 17:54:08)                Impression:      1. Postoperative fluid collection in the subcutaneous fat of the anterior  abdominal wall at the umbilicus, at the site of recent umbilical hernia surgery. This fluid collection measures 4.7 x 2.3 x 6.0 cm, and is accompanied by a tiny  collection immediately posterior to the abdominal wall musculature at the same  level, measuring 0.6 x 0.6 x 1.8 cm. . There is no rim enhancement or soft tissue  thickening to suggest abscess. Correlate clinically and follow-up as  appropriate. Narrative:    EXAM: CT ABD PELV W CONT    INDICATION: Post operative infection, abdomen pelvis; periumbilical swelling and  pain after umbilical hernia repair 2 weeks ago, evaluate for fluid collection    COMPARISON: None. CONTRAST: 100 mL of Isovue-370. TECHNIQUE:   Following the uneventful intravenous administration of contrast, thin axial  images were obtained through the abdomen and pelvis. Coronal and sagittal  reconstructions were generated. Oral contrast was administered.  CT dose  reduction was achieved through use of a standardized protocol tailored for this  examination and automatic exposure control for dose modulation. FINDINGS:   LUNG BASES: Clear. INCIDENTALLY IMAGED HEART AND MEDIASTINUM: Unremarkable. LIVER: No mass or biliary dilatation. GALLBLADDER: Unremarkable. SPLEEN: No mass. PANCREAS: No mass or ductal dilatation. ADRENALS: Unremarkable. KIDNEYS: No mass, calculus, or hydronephrosis. STOMACH: Unremarkable. SMALL BOWEL: No dilatation or wall thickening. COLON: No dilatation or wall thickening. APPENDIX: Unremarkable. PERITONEUM: No ascites or pneumoperitoneum. RETROPERITONEUM: No lymphadenopathy or aortic aneurysm. REPRODUCTIVE ORGANS: Unremarkable for age. URINARY BLADDER: No mass or calculus. BONES: No destructive bone lesion. ADDITIONAL COMMENTS: At the umbilicus, at the site of recent surgery, there is a  subcutaneous fluid collection between the skin in the anterior abdominal  musculature, measuring 4.7 x 2.3 x 6.0 centimeters, measuring water density. There is an additional tiny fluid collection measuring 0.6 x 0.6 by 1.8  centimeters, immediately posterior to the abdominal wall at the umbilicus.

## 2019-08-24 ENCOUNTER — APPOINTMENT (OUTPATIENT)
Dept: CT IMAGING | Age: 29
End: 2019-08-24
Attending: PHYSICIAN ASSISTANT
Payer: MEDICAID

## 2019-08-24 ENCOUNTER — HOSPITAL ENCOUNTER (EMERGENCY)
Age: 29
Discharge: HOME | End: 2019-08-24
Attending: EMERGENCY MEDICINE
Payer: MEDICAID

## 2019-08-24 VITALS
BODY MASS INDEX: 25.97 KG/M2 | TEMPERATURE: 98.7 F | DIASTOLIC BLOOD PRESSURE: 80 MMHG | SYSTOLIC BLOOD PRESSURE: 124 MMHG | WEIGHT: 146.61 LBS | HEART RATE: 78 BPM | RESPIRATION RATE: 18 BRPM | OXYGEN SATURATION: 100 %

## 2019-08-24 DIAGNOSIS — N83.201 RIGHT OVARIAN CYST: Primary | ICD-10-CM

## 2019-08-24 LAB
ALBUMIN SERPL-MCNC: 4 G/DL (ref 3.5–5)
ALBUMIN/GLOB SERPL: 1.2 {RATIO} (ref 1.1–2.2)
ALP SERPL-CCNC: 78 U/L (ref 45–117)
ALT SERPL-CCNC: 22 U/L (ref 12–78)
ANION GAP SERPL CALC-SCNC: 5 MMOL/L (ref 5–15)
APPEARANCE UR: CLEAR
AST SERPL-CCNC: 24 U/L (ref 15–37)
BASOPHILS # BLD: 0.1 K/UL (ref 0–0.1)
BASOPHILS NFR BLD: 1 % (ref 0–1)
BILIRUB SERPL-MCNC: 0.6 MG/DL (ref 0.2–1)
BILIRUB UR QL: NEGATIVE
BUN SERPL-MCNC: 13 MG/DL (ref 6–20)
BUN/CREAT SERPL: 13 (ref 12–20)
CALCIUM SERPL-MCNC: 9.4 MG/DL (ref 8.5–10.1)
CHLORIDE SERPL-SCNC: 106 MMOL/L (ref 97–108)
CO2 SERPL-SCNC: 30 MMOL/L (ref 21–32)
COLOR UR: NORMAL
CREAT SERPL-MCNC: 1 MG/DL (ref 0.55–1.02)
D DIMER PPP FEU-MCNC: <0.19 MG/L FEU (ref 0–0.65)
DIFFERENTIAL METHOD BLD: NORMAL
EOSINOPHIL # BLD: 0.2 K/UL (ref 0–0.4)
EOSINOPHIL NFR BLD: 2 % (ref 0–7)
ERYTHROCYTE [DISTWIDTH] IN BLOOD BY AUTOMATED COUNT: 13.9 % (ref 11.5–14.5)
GLOBULIN SER CALC-MCNC: 3.4 G/DL (ref 2–4)
GLUCOSE SERPL-MCNC: 73 MG/DL (ref 65–100)
GLUCOSE UR STRIP.AUTO-MCNC: NEGATIVE MG/DL
HCG UR QL: NEGATIVE
HCT VFR BLD AUTO: 45.1 % (ref 35–47)
HGB BLD-MCNC: 14.8 G/DL (ref 11.5–16)
HGB UR QL STRIP: NEGATIVE
IMM GRANULOCYTES # BLD AUTO: 0 K/UL (ref 0–0.04)
IMM GRANULOCYTES NFR BLD AUTO: 0 % (ref 0–0.5)
KETONES UR QL STRIP.AUTO: NEGATIVE MG/DL
LEUKOCYTE ESTERASE UR QL STRIP.AUTO: NEGATIVE
LIPASE SERPL-CCNC: 72 U/L (ref 73–393)
LYMPHOCYTES # BLD: 2.6 K/UL (ref 0.8–3.5)
LYMPHOCYTES NFR BLD: 26 % (ref 12–49)
MCH RBC QN AUTO: 29.9 PG (ref 26–34)
MCHC RBC AUTO-ENTMCNC: 32.8 G/DL (ref 30–36.5)
MCV RBC AUTO: 91.1 FL (ref 80–99)
MONOCYTES # BLD: 0.5 K/UL (ref 0–1)
MONOCYTES NFR BLD: 5 % (ref 5–13)
NEUTS SEG # BLD: 6.7 K/UL (ref 1.8–8)
NEUTS SEG NFR BLD: 66 % (ref 32–75)
NITRITE UR QL STRIP.AUTO: NEGATIVE
NRBC # BLD: 0 K/UL (ref 0–0.01)
NRBC BLD-RTO: 0 PER 100 WBC
PH UR STRIP: 6.5 [PH] (ref 5–8)
PLATELET # BLD AUTO: 221 K/UL (ref 150–400)
PMV BLD AUTO: 10.5 FL (ref 8.9–12.9)
POTASSIUM SERPL-SCNC: 4.9 MMOL/L (ref 3.5–5.1)
PROT SERPL-MCNC: 7.4 G/DL (ref 6.4–8.2)
PROT UR STRIP-MCNC: NEGATIVE MG/DL
RBC # BLD AUTO: 4.95 M/UL (ref 3.8–5.2)
SODIUM SERPL-SCNC: 141 MMOL/L (ref 136–145)
SP GR UR REFRACTOMETRY: 1.02 (ref 1–1.03)
UROBILINOGEN UR QL STRIP.AUTO: 0.2 EU/DL (ref 0.2–1)
WBC # BLD AUTO: 10.1 K/UL (ref 3.6–11)

## 2019-08-24 PROCEDURE — 74177 CT ABD & PELVIS W/CONTRAST: CPT

## 2019-08-24 PROCEDURE — 36415 COLL VENOUS BLD VENIPUNCTURE: CPT

## 2019-08-24 PROCEDURE — 80053 COMPREHEN METABOLIC PANEL: CPT

## 2019-08-24 PROCEDURE — 81003 URINALYSIS AUTO W/O SCOPE: CPT

## 2019-08-24 PROCEDURE — 83690 ASSAY OF LIPASE: CPT

## 2019-08-24 PROCEDURE — 99283 EMERGENCY DEPT VISIT LOW MDM: CPT

## 2019-08-24 PROCEDURE — 81025 URINE PREGNANCY TEST: CPT

## 2019-08-24 PROCEDURE — 74011636320 HC RX REV CODE- 636/320: Performed by: EMERGENCY MEDICINE

## 2019-08-24 PROCEDURE — 85379 FIBRIN DEGRADATION QUANT: CPT

## 2019-08-24 PROCEDURE — 85025 COMPLETE CBC W/AUTO DIFF WBC: CPT

## 2019-08-24 RX ORDER — SODIUM CHLORIDE 0.9 % (FLUSH) 0.9 %
5-40 SYRINGE (ML) INJECTION AS NEEDED
Status: DISCONTINUED | OUTPATIENT
Start: 2019-08-24 | End: 2019-08-24 | Stop reason: HOSPADM

## 2019-08-24 RX ORDER — METHOCARBAMOL 750 MG/1
750 TABLET, FILM COATED ORAL 4 TIMES DAILY
Qty: 20 TAB | Refills: 0 | Status: SHIPPED | OUTPATIENT
Start: 2019-08-24 | End: 2020-10-09

## 2019-08-24 RX ORDER — SODIUM CHLORIDE 0.9 % (FLUSH) 0.9 %
10 SYRINGE (ML) INJECTION
Status: COMPLETED | OUTPATIENT
Start: 2019-08-24 | End: 2019-08-24

## 2019-08-24 RX ORDER — SODIUM CHLORIDE 0.9 % (FLUSH) 0.9 %
5-40 SYRINGE (ML) INJECTION EVERY 8 HOURS
Status: DISCONTINUED | OUTPATIENT
Start: 2019-08-24 | End: 2019-08-24 | Stop reason: HOSPADM

## 2019-08-24 RX ADMIN — Medication 10 ML: at 12:56

## 2019-08-24 RX ADMIN — IOPAMIDOL 100 ML: 755 INJECTION, SOLUTION INTRAVENOUS at 12:55

## 2019-08-24 NOTE — ED PROVIDER NOTES
EMERGENCY DEPARTMENT HISTORY AND PHYSICAL EXAM      Date: 8/24/2019  Patient Name: Thad Ahn    History of Presenting Illness     Chief Complaint   Patient presents with    Abdominal Pain     right lower, denies urinary s/s. pain is constant denies urinary s/s       History Provided By: Patient    HPI: Thad Ahn, 29 y.o. female with PMHx significant for depression, presents ambulatory to the ED with cc of right-sided abdominal pain for the last 3 days that is been constant since onset. Patient denies any modifying or exacerbating factors. She states that she is a  and she is right-handed and she feels the pain from her right abdomen up to her right shoulder. She does not believe that this is muscular and feels different than her usual muscular pain. She denies any nausea, vomiting, diarrhea, fever, chills, dysuria, hematuria. LMP two weeks ago. PCP: None    There are no other complaints, changes, or physical findings at this time. Current Facility-Administered Medications   Medication Dose Route Frequency Provider Last Rate Last Dose    sodium chloride (NS) flush 5-40 mL  5-40 mL IntraVENous Q8H Karl Perez [place]        sodium chloride (NS) flush 5-40 mL  5-40 mL IntraVENous PRN Primo SHIRLEY [place]         Current Outpatient Medications   Medication Sig Dispense Refill    methocarbamol (ROBAXIN-750) 750 mg tablet Take 1 Tab by mouth four (4) times daily. Indications: muscle spasm 20 Tab 0    CAMRESE LO 0.10 mg-20 mcg (84)/10 mcg (7) 3MPk       FLUoxetine (PROZAC) 20 mg capsule Take 1 Cap by mouth daily. Indications: Generalized Anxiety Disorder, major depressive disorder 15 Cap 1    albuterol (PROVENTIL HFA, VENTOLIN HFA) 90 mcg/actuation inhaler Take 2 Puffs by inhalation every four (4) hours as needed for Wheezing.  1 Inhaler 2       Past History     Past Medical History:  Past Medical History:   Diagnosis Date    Abnormal Pap smear     Routine gynecological examination [facility]       Past Surgical History:  Past Surgical History:   Procedure Laterality Date    HX GYN      HX TONSILLECTOMY         Family History:  Family History   Problem Relation Age of Onset    Hypertension Father        Social History:  Social History     Tobacco Use    Smoking status: Current Every Day Smoker     Packs/day: 0.50     Years: 6.00     Pack years: 3.00     Types: Cigarettes    Smokeless tobacco: Never Used   Substance Use Topics    Alcohol use: Yes     Alcohol/week: 10.0 standard drinks     Types: 12 Cans of beer per week     Comment: daily liquor    Drug use: Yes     Types: Marijuana       Allergies:  No Known Allergies      Review of Systems   Review of Systems   Constitutional: Negative. Negative for activity change, appetite change, chills and fever. Respiratory: Negative for cough and shortness of breath. Cardiovascular: Negative for chest pain and palpitations. Gastrointestinal: Positive for abdominal pain. Negative for diarrhea, nausea and vomiting. Genitourinary: Negative for dysuria and hematuria. Musculoskeletal: Negative for arthralgias and myalgias. Skin: Negative for color change, rash and wound. Neurological: Negative for dizziness, numbness and headaches. All other systems reviewed and are negative. Physical Exam   Physical Exam   Constitutional: She is oriented to person, place, and time. Vital signs are normal. She appears well-developed and well-nourished. No distress. 29 y.o. female in NAD  Communicates appropriately and in full sentences  Normal vital signs   HENT:   Head: Normocephalic and atraumatic. Eyes: Pupils are equal, round, and reactive to light. Conjunctivae are normal. Right eye exhibits no discharge. Left eye exhibits no discharge. Neck: Normal range of motion. Neck supple. No nuchal rigidity   Cardiovascular: Normal rate, regular rhythm and intact distal pulses.    Pulmonary/Chest: Effort normal and breath sounds normal. No respiratory distress. She has no wheezes. Abdominal: Soft. Bowel sounds are normal. She exhibits no distension. There is tenderness (to lateral aspect of R flank). There is no rebound and no guarding. Musculoskeletal: Normal range of motion. She exhibits no deformity. No neurologic or vascular compromise on exam.    Neurological: She is alert and oriented to person, place, and time. Skin: Skin is warm and dry. No rash noted. She is not diaphoretic. No erythema. No pallor. Psychiatric: She has a normal mood and affect. Nursing note and vitals reviewed. Diagnostic Study Results     Labs -     Recent Results (from the past 12 hour(s))   URINALYSIS W/ RFLX MICROSCOPIC    Collection Time: 08/24/19 11:08 AM   Result Value Ref Range    Color YELLOW/STRAW      Appearance CLEAR CLEAR      Specific gravity 1.020 1.003 - 1.030      pH (UA) 6.5 5.0 - 8.0      Protein NEGATIVE  NEG mg/dL    Glucose NEGATIVE  NEG mg/dL    Ketone NEGATIVE  NEG mg/dL    Bilirubin NEGATIVE  NEG      Blood NEGATIVE  NEG      Urobilinogen 0.2 0.2 - 1.0 EU/dL    Nitrites NEGATIVE  NEG      Leukocyte Esterase NEGATIVE  NEG     HCG URINE, QL    Collection Time: 08/24/19 11:08 AM   Result Value Ref Range    HCG urine, QL NEGATIVE  NEG     CBC WITH AUTOMATED DIFF    Collection Time: 08/24/19 11:27 AM   Result Value Ref Range    WBC 10.1 3.6 - 11.0 K/uL    RBC 4.95 3.80 - 5.20 M/uL    HGB 14.8 11.5 - 16.0 g/dL    HCT 45.1 35.0 - 47.0 %    MCV 91.1 80.0 - 99.0 FL    MCH 29.9 26.0 - 34.0 PG    MCHC 32.8 30.0 - 36.5 g/dL    RDW 13.9 11.5 - 14.5 %    PLATELET 166 726 - 381 K/uL    MPV 10.5 8.9 - 12.9 FL    NRBC 0.0 0  WBC    ABSOLUTE NRBC 0.00 0.00 - 0.01 K/uL    NEUTROPHILS 66 32 - 75 %    LYMPHOCYTES 26 12 - 49 %    MONOCYTES 5 5 - 13 %    EOSINOPHILS 2 0 - 7 %    BASOPHILS 1 0 - 1 %    IMMATURE GRANULOCYTES 0 0.0 - 0.5 %    ABS. NEUTROPHILS 6.7 1.8 - 8.0 K/UL    ABS. LYMPHOCYTES 2.6 0.8 - 3.5 K/UL    ABS.  MONOCYTES 0.5 0.0 - 1.0 K/UL    ABS. EOSINOPHILS 0.2 0.0 - 0.4 K/UL    ABS. BASOPHILS 0.1 0.0 - 0.1 K/UL    ABS. IMM. GRANS. 0.0 0.00 - 0.04 K/UL    DF AUTOMATED     METABOLIC PANEL, COMPREHENSIVE    Collection Time: 08/24/19 11:27 AM   Result Value Ref Range    Sodium 141 136 - 145 mmol/L    Potassium 4.9 3.5 - 5.1 mmol/L    Chloride 106 97 - 108 mmol/L    CO2 30 21 - 32 mmol/L    Anion gap 5 5 - 15 mmol/L    Glucose 73 65 - 100 mg/dL    BUN 13 6 - 20 MG/DL    Creatinine 1.00 0.55 - 1.02 MG/DL    BUN/Creatinine ratio 13 12 - 20      GFR est AA >60 >60 ml/min/1.73m2    GFR est non-AA >60 >60 ml/min/1.73m2    Calcium 9.4 8.5 - 10.1 MG/DL    Bilirubin, total 0.6 0.2 - 1.0 MG/DL    ALT (SGPT) 22 12 - 78 U/L    AST (SGOT) 24 15 - 37 U/L    Alk. phosphatase 78 45 - 117 U/L    Protein, total 7.4 6.4 - 8.2 g/dL    Albumin 4.0 3.5 - 5.0 g/dL    Globulin 3.4 2.0 - 4.0 g/dL    A-G Ratio 1.2 1.1 - 2.2     LIPASE    Collection Time: 08/24/19 11:27 AM   Result Value Ref Range    Lipase 72 (L) 73 - 393 U/L   D DIMER    Collection Time: 08/24/19 12:19 PM   Result Value Ref Range    D-dimer <0.19 0.00 - 0.65 mg/L FEU       Radiologic Studies -   CT ABD PELV W CONT   Final Result   IMPRESSION:   Normal appendix. 2.2 cm partially collapsed right ovarian cyst, likely   physiologic. CT Results  (Last 48 hours)               08/24/19 1257  CT ABD PELV W CONT Final result    Impression:  IMPRESSION:   Normal appendix. 2.2 cm partially collapsed right ovarian cyst, likely   physiologic. Narrative:  EXAM: CT ABD PELV W CONT       INDICATION: RLQ pain, appendicitis suspect, fever, elev WBC, classic   presentation        COMPARISON: 5/26/2019        CONTRAST: 100 mL of Isovue-370. TECHNIQUE:    Following the uneventful intravenous administration of contrast, thin axial   images were obtained through the abdomen and pelvis. Coronal and sagittal   reconstructions were generated. Oral contrast was not administered.  CT dose   reduction was achieved through use of a standardized protocol tailored for this   examination and automatic exposure control for dose modulation. FINDINGS:    LUNG BASES: Clear. INCIDENTALLY IMAGED HEART AND MEDIASTINUM: Unremarkable. LIVER: No mass or biliary dilatation. GALLBLADDER: Unremarkable. SPLEEN: No mass. PANCREAS: No mass or ductal dilatation. ADRENALS: Unremarkable. KIDNEYS: No mass, calculus, or hydronephrosis. STOMACH: Unremarkable. SMALL BOWEL: No dilatation or wall thickening. COLON: No dilatation or wall thickening. APPENDIX: Normal.   PERITONEUM: No ascites or pneumoperitoneum. RETROPERITONEUM: No lymphadenopathy or aortic aneurysm. REPRODUCTIVE ORGANS: Normal uterus. 2.2 cm partially collapsed right ovarian   cyst.   URINARY BLADDER: No mass or calculus. BONES: No destructive bone lesion. ADDITIONAL COMMENTS: N/A               CXR Results  (Last 48 hours)    None            Medical Decision Making   I am the first provider for this patient. I reviewed the vital signs, available nursing notes, past medical history, past surgical history, family history and social history. Vital Signs-Reviewed the patient's vital signs. Patient Vitals for the past 12 hrs:   Temp Pulse Resp BP SpO2   08/24/19 1056 98.7 °F (37.1 °C) 78 18 124/80 100 %         Records Reviewed: Nursing Notes and Old Medical Records    Provider Notes (Medical Decision Making):   DDX: UTI, appendicitis, ovarian cyst, dehydration, electrolyte abnormality, kidney stone, hepatitis, pancreatitis, muscular strain, spasm, PE.    ED Course:   Initial assessment performed. The patients presenting problems have been discussed, and they are in agreement with the care plan formulated and outlined with them. I have encouraged them to ask questions as they arise throughout their visit. DISCHARGE NOTE:  Jemma Betancur's  results have been reviewed with her. She has been counseled regarding her diagnosis.   She verbally conveys understanding and agreement of the signs, symptoms, diagnosis, treatment and prognosis and additionally agrees to follow up as recommended with Dr. None in 24 - 48 hours. She also agrees with the care-plan and conveys that all of her questions have been answered. I have also put together some discharge instructions for her that include: 1) educational information regarding their diagnosis, 2) how to care for their diagnosis at home, as well a 3) list of reasons why they would want to return to the ED prior to their follow-up appointment, should their condition change. She and/or family's questions have been answered. I have encouraged them to see the official results in [facility]\" or to retrieve the specifics of their results from medical records. PLAN:  1. Return precautions as discussed  2. Follow-up with providers as directed  3. Medications as prescribed    Return to ED if worse     Diagnosis     Clinical Impression:   1. Right ovarian cyst        Discharge Medication List as of 8/24/2019  1:28 PM      START taking these medications    Details   methocarbamol (ROBAXIN-750) 750 mg tablet Take 1 Tab by mouth four (4) times daily. Indications: muscle spasm, Normal, Disp-20 Tab, R-0         CONTINUE these medications which have NOT CHANGED    Details   CAMRESE LO 0.10 mg-20 mcg (84)/10 mcg (7) 3MPk Historical Med, EB      FLUoxetine (PROZAC) 20 mg capsule Take 1 Cap by mouth daily. Indications: Generalized Anxiety Disorder, major depressive disorder, Print, Disp-15 Cap, R-1      albuterol (PROVENTIL HFA, VENTOLIN HFA) 90 mcg/actuation inhaler Take 2 Puffs by inhalation every four (4) hours as needed for Wheezing. Normal, 2 Puff, Disp-1 Inhaler, R-2             Follow-up Information     Follow up With Specialties Details Why Contact Info    [facility] EMERGENCY DEPT Emergency Medicine Go to If symptoms worsen [address] [facility] [address] [phone]    [facility]  Call today  [address] [id number]  [address] 62              This note will not be viewable in [address]

## 2019-08-24 NOTE — DISCHARGE INSTRUCTIONS
Patient Education        Functional Ovarian Cyst: Care Instructions  Your Care Instructions    A functional ovarian cyst is a sac that forms on the surface of a woman's ovary during ovulation. The sac holds a maturing egg. Usually the sac goes away after the egg is released. But if the egg is not released, or if the sac closes up after the egg is released, the sac can swell up with fluid and form a cyst.  Functional ovarian cysts are different than ovarian growths caused by other problems, such as cancer. Most functional ovarian cysts cause no symptoms and go away on their own. Some cause mild pain. Others can cause severe pain when they rupture or bleed. Follow-up care is a key part of your treatment and safety. Be sure to make and go to all appointments, and call your doctor if you are having problems. It's also a good idea to know your test results and keep a list of the medicines you take. How can you care for yourself at home? · Use heat, such as a hot water bottle, a heating pad set on low, or a warm bath, to relax tense muscles and relieve cramping. · Be safe with medicines. Take pain medicines exactly as directed. ? If the doctor gave you a prescription medicine for pain, take it as prescribed. ? If you are not taking a prescription pain medicine, ask your doctor if you can take an over-the-counter medicine. · Avoid constipation. Make sure you drink enough fluids and include fruits, vegetables, and fiber in your diet each day. Constipation does not cause ovarian cysts, but it may make your pelvic pain worse. When should you call for help? Call your doctor now or seek immediate medical care if:    · You have severe vaginal bleeding.     · You have new or worse belly or pelvic pain.    Watch closely for changes in your health, and be sure to contact your doctor if:    · You have unusual vaginal bleeding.     · You do not get better as expected. Where can you learn more?   Go to http://lakshmi-alonso.info/. Enter L008 in the search box to learn more about \"Functional Ovarian Cyst: Care Instructions. \"  Current as of: February 19, 2019  Content Version: 12.1  © 6794-5078 [employer], [employer]. Care instructions adapted under license by [employer] (which disclaims liability or warranty for this information). If you have questions about a medical condition or this instruction, always ask your healthcare professional. [address] any warranty or liability for your use of this information.

## 2019-08-24 NOTE — LETTER
[address] 
[facility] EMERGENCY DEPT 
[address] [place] [id number] [phone] Work/School Note Date: 8/24/2019 To Whom It May concern: 
 
Meeta Pruitt was seen and treated today in the emergency room by the following provider(s): 
Attending Provider: Mauri Cantu MD 
Physician Assistant: MARKELL Nuñez. Meeta Pruitt may return to work on 08/26/2019. Sincerely, 
 
 
 
 
Agustin Burton.  [place], [place]

## 2019-08-24 NOTE — ED NOTES
Sarika Singh reviewed discharge instructions with the patient. The patient verbalized understanding. All questions and concerns were addressed. The patient declined a wheelchair and is discharged ambulatory in the care of family members with instructions and prescriptions in hand. Pt is alert and oriented x 4. Respirations are clear and unlabored.

## 2020-10-09 ENCOUNTER — HOSPITAL ENCOUNTER (OUTPATIENT)
Age: 30
Setting detail: OBSERVATION
Discharge: HOME | End: 2020-10-10
Attending: EMERGENCY MEDICINE | Admitting: ORTHOPAEDIC SURGERY
Payer: MEDICAID

## 2020-10-09 ENCOUNTER — ANESTHESIA EVENT (OUTPATIENT)
Dept: SURGERY | Age: 30
End: 2020-10-09
Payer: MEDICAID

## 2020-10-09 ENCOUNTER — APPOINTMENT (OUTPATIENT)
Dept: GENERAL RADIOLOGY | Age: 30
End: 2020-10-09
Attending: PHYSICIAN ASSISTANT
Payer: MEDICAID

## 2020-10-09 ENCOUNTER — ANESTHESIA (OUTPATIENT)
Dept: SURGERY | Age: 30
End: 2020-10-09
Payer: MEDICAID

## 2020-10-09 DIAGNOSIS — S52.201A CLOSED FRACTURE OF RIGHT RADIUS AND ULNA, INITIAL ENCOUNTER: Primary | ICD-10-CM

## 2020-10-09 DIAGNOSIS — S52.91XA CLOSED FRACTURE OF RIGHT RADIUS AND ULNA, INITIAL ENCOUNTER: Primary | ICD-10-CM

## 2020-10-09 PROBLEM — S52.92XA LEFT FOREARM FRACTURE: Status: ACTIVE | Noted: 2020-10-09

## 2020-10-09 PROBLEM — S52.202A RADIUS/ULNA FRACTURE, LEFT, CLOSED, INITIAL ENCOUNTER: Status: ACTIVE | Noted: 2020-10-09

## 2020-10-09 PROBLEM — S52.92XA RADIUS/ULNA FRACTURE, LEFT, CLOSED, INITIAL ENCOUNTER: Status: ACTIVE | Noted: 2020-10-09

## 2020-10-09 LAB
ALBUMIN SERPL-MCNC: 4.1 G/DL (ref 3.5–5)
ALBUMIN/GLOB SERPL: 1.3 {RATIO} (ref 1.1–2.2)
ALP SERPL-CCNC: 82 U/L (ref 45–117)
ALT SERPL-CCNC: 18 U/L (ref 12–78)
ANION GAP SERPL CALC-SCNC: 4 MMOL/L (ref 5–15)
APPEARANCE UR: ABNORMAL
AST SERPL-CCNC: 14 U/L (ref 15–37)
BACTERIA URNS QL MICRO: NEGATIVE /HPF
BASOPHILS # BLD: 0 K/UL (ref 0–0.1)
BASOPHILS NFR BLD: 0 % (ref 0–1)
BILIRUB SERPL-MCNC: 0.4 MG/DL (ref 0.2–1)
BILIRUB UR QL: NEGATIVE
BUN SERPL-MCNC: 11 MG/DL (ref 6–20)
BUN/CREAT SERPL: 13 (ref 12–20)
CALCIUM SERPL-MCNC: 8.6 MG/DL (ref 8.5–10.1)
CHLORIDE SERPL-SCNC: 108 MMOL/L (ref 97–108)
CO2 SERPL-SCNC: 27 MMOL/L (ref 21–32)
COLOR UR: ABNORMAL
CREAT SERPL-MCNC: 0.86 MG/DL (ref 0.55–1.02)
DIFFERENTIAL METHOD BLD: ABNORMAL
EOSINOPHIL # BLD: 0.1 K/UL (ref 0–0.4)
EOSINOPHIL NFR BLD: 1 % (ref 0–7)
EPITH CASTS URNS QL MICRO: ABNORMAL /LPF
ERYTHROCYTE [DISTWIDTH] IN BLOOD BY AUTOMATED COUNT: 13.2 % (ref 11.5–14.5)
GLOBULIN SER CALC-MCNC: 3.2 G/DL (ref 2–4)
GLUCOSE SERPL-MCNC: 105 MG/DL (ref 65–100)
GLUCOSE UR STRIP.AUTO-MCNC: NEGATIVE MG/DL
HCG UR QL: NEGATIVE
HCT VFR BLD AUTO: 40.4 % (ref 35–47)
HGB BLD-MCNC: 13.9 G/DL (ref 11.5–16)
HGB UR QL STRIP: ABNORMAL
IMM GRANULOCYTES # BLD AUTO: 0.1 K/UL (ref 0–0.04)
IMM GRANULOCYTES NFR BLD AUTO: 0 % (ref 0–0.5)
INR PPP: 1 (ref 0.9–1.1)
KETONES UR QL STRIP.AUTO: NEGATIVE MG/DL
LEUKOCYTE ESTERASE UR QL STRIP.AUTO: NEGATIVE
LYMPHOCYTES # BLD: 1.8 K/UL (ref 0.8–3.5)
LYMPHOCYTES NFR BLD: 14 % (ref 12–49)
MCH RBC QN AUTO: 31.2 PG (ref 26–34)
MCHC RBC AUTO-ENTMCNC: 34.4 G/DL (ref 30–36.5)
MCV RBC AUTO: 90.6 FL (ref 80–99)
MONOCYTES # BLD: 0.6 K/UL (ref 0–1)
MONOCYTES NFR BLD: 4 % (ref 5–13)
NEUTS SEG # BLD: 10.4 K/UL (ref 1.8–8)
NEUTS SEG NFR BLD: 81 % (ref 32–75)
NITRITE UR QL STRIP.AUTO: NEGATIVE
NRBC # BLD: 0 K/UL (ref 0–0.01)
NRBC BLD-RTO: 0 PER 100 WBC
PH UR STRIP: 6.5 [PH] (ref 5–8)
PLATELET # BLD AUTO: 241 K/UL (ref 150–400)
PMV BLD AUTO: 10.9 FL (ref 8.9–12.9)
POTASSIUM SERPL-SCNC: 3.8 MMOL/L (ref 3.5–5.1)
PROT SERPL-MCNC: 7.3 G/DL (ref 6.4–8.2)
PROT UR STRIP-MCNC: NEGATIVE MG/DL
PROTHROMBIN TIME: 10.9 SEC (ref 9–11.1)
RBC # BLD AUTO: 4.46 M/UL (ref 3.8–5.2)
RBC #/AREA URNS HPF: ABNORMAL /HPF (ref 0–5)
SODIUM SERPL-SCNC: 139 MMOL/L (ref 136–145)
SP GR UR REFRACTOMETRY: 1.01 (ref 1–1.03)
UA: UC IF INDICATED,UAUC: ABNORMAL
UROBILINOGEN UR QL STRIP.AUTO: 0.2 EU/DL (ref 0.2–1)
WBC # BLD AUTO: 12.9 K/UL (ref 3.6–11)
WBC URNS QL MICRO: ABNORMAL /HPF (ref 0–4)

## 2020-10-09 PROCEDURE — 77030003862 HC BIT DRL SYNT -B: Performed by: ORTHOPAEDIC SURGERY

## 2020-10-09 PROCEDURE — 99284 EMERGENCY DEPT VISIT MOD MDM: CPT

## 2020-10-09 PROCEDURE — 96376 TX/PRO/DX INJ SAME DRUG ADON: CPT

## 2020-10-09 PROCEDURE — 77030008684 HC TU ET CUF COVD -B: Performed by: NURSE ANESTHETIST, CERTIFIED REGISTERED

## 2020-10-09 PROCEDURE — 77030010507 HC ADH SKN DERMBND J&J -B: Performed by: ORTHOPAEDIC SURGERY

## 2020-10-09 PROCEDURE — 74011000250 HC RX REV CODE- 250: Performed by: PHYSICIAN ASSISTANT

## 2020-10-09 PROCEDURE — 77030013079 HC BLNKT BAIR HGGR 3M -A: Performed by: NURSE ANESTHETIST, CERTIFIED REGISTERED

## 2020-10-09 PROCEDURE — C1713 ANCHOR/SCREW BN/BN,TIS/BN: HCPCS | Performed by: ORTHOPAEDIC SURGERY

## 2020-10-09 PROCEDURE — 2709999900 HC NON-CHARGEABLE SUPPLY: Performed by: ORTHOPAEDIC SURGERY

## 2020-10-09 PROCEDURE — 71045 X-RAY EXAM CHEST 1 VIEW: CPT

## 2020-10-09 PROCEDURE — 74011250636 HC RX REV CODE- 250/636: Performed by: PHYSICIAN ASSISTANT

## 2020-10-09 PROCEDURE — 99218 HC RM OBSERVATION: CPT

## 2020-10-09 PROCEDURE — 36415 COLL VENOUS BLD VENIPUNCTURE: CPT

## 2020-10-09 PROCEDURE — 74011000250 HC RX REV CODE- 250: Performed by: ANESTHESIOLOGY

## 2020-10-09 PROCEDURE — 74011250636 HC RX REV CODE- 250/636: Performed by: EMERGENCY MEDICINE

## 2020-10-09 PROCEDURE — 96374 THER/PROPH/DIAG INJ IV PUSH: CPT

## 2020-10-09 PROCEDURE — 77030019908 HC STETH ESOPH SIMS -A: Performed by: NURSE ANESTHETIST, CERTIFIED REGISTERED

## 2020-10-09 PROCEDURE — 81025 URINE PREGNANCY TEST: CPT

## 2020-10-09 PROCEDURE — 76060000035 HC ANESTHESIA 2 TO 2.5 HR: Performed by: ORTHOPAEDIC SURGERY

## 2020-10-09 PROCEDURE — A4565 SLINGS: HCPCS | Performed by: ORTHOPAEDIC SURGERY

## 2020-10-09 PROCEDURE — 74011000250 HC RX REV CODE- 250

## 2020-10-09 PROCEDURE — 73090 X-RAY EXAM OF FOREARM: CPT

## 2020-10-09 PROCEDURE — 77030026438 HC STYL ET INTUB CARD -A: Performed by: NURSE ANESTHETIST, CERTIFIED REGISTERED

## 2020-10-09 PROCEDURE — 74011250637 HC RX REV CODE- 250/637: Performed by: ORTHOPAEDIC SURGERY

## 2020-10-09 PROCEDURE — 76010000131 HC OR TIME 2 TO 2.5 HR: Performed by: ORTHOPAEDIC SURGERY

## 2020-10-09 PROCEDURE — 80053 COMPREHEN METABOLIC PANEL: CPT

## 2020-10-09 PROCEDURE — 77030002933 HC SUT MCRYL J&J -A: Performed by: ORTHOPAEDIC SURGERY

## 2020-10-09 PROCEDURE — 76210000016 HC OR PH I REC 1 TO 1.5 HR: Performed by: ORTHOPAEDIC SURGERY

## 2020-10-09 PROCEDURE — 85610 PROTHROMBIN TIME: CPT

## 2020-10-09 PROCEDURE — 85025 COMPLETE CBC W/AUTO DIFF WBC: CPT

## 2020-10-09 PROCEDURE — 77030031139 HC SUT VCRL2 J&J -A: Performed by: ORTHOPAEDIC SURGERY

## 2020-10-09 PROCEDURE — 77030039497 HC CST PAD STERILE CHCS -A: Performed by: ORTHOPAEDIC SURGERY

## 2020-10-09 PROCEDURE — 74011250636 HC RX REV CODE- 250/636: Performed by: ANESTHESIOLOGY

## 2020-10-09 PROCEDURE — 74011000250 HC RX REV CODE- 250: Performed by: NURSE ANESTHETIST, CERTIFIED REGISTERED

## 2020-10-09 PROCEDURE — 75810000053 HC SPLINT APPLICATION

## 2020-10-09 PROCEDURE — 74011250636 HC RX REV CODE- 250/636: Performed by: NURSE ANESTHETIST, CERTIFIED REGISTERED

## 2020-10-09 PROCEDURE — 96375 TX/PRO/DX INJ NEW DRUG ADDON: CPT

## 2020-10-09 PROCEDURE — 74011250636 HC RX REV CODE- 250/636

## 2020-10-09 PROCEDURE — 77030032497 HC WRP SHLDR WO BGS SOLM -B

## 2020-10-09 PROCEDURE — 74011250636 HC RX REV CODE- 250/636: Performed by: ORTHOPAEDIC SURGERY

## 2020-10-09 PROCEDURE — 81001 URINALYSIS AUTO W/SCOPE: CPT

## 2020-10-09 DEVICE — SCREW BNE L14MM DIA3.5MM CORT S STL ST FULL THRD LOK T15: Type: IMPLANTABLE DEVICE | Site: RADIUS | Status: FUNCTIONAL

## 2020-10-09 DEVICE — SCREW BNE L16MM DIA3.5MM CORT S STL ST LOK FULL THRD T15: Type: IMPLANTABLE DEVICE | Site: ULNA | Status: FUNCTIONAL

## 2020-10-09 DEVICE — 3.5MM CORTEX SCREW SELF-TAPPING 14MM: Type: IMPLANTABLE DEVICE | Site: ULNA | Status: FUNCTIONAL

## 2020-10-09 DEVICE — PLATE BNE L85MM THK3.4MM 6 H BILAT S STL STR LOK COMPR FOR: Type: IMPLANTABLE DEVICE | Site: RADIUS | Status: FUNCTIONAL

## 2020-10-09 RX ORDER — ONDANSETRON 2 MG/ML
4 INJECTION INTRAMUSCULAR; INTRAVENOUS
Status: DISCONTINUED | OUTPATIENT
Start: 2020-10-09 | End: 2020-10-10 | Stop reason: HOSPADM

## 2020-10-09 RX ORDER — FAMOTIDINE 20 MG/1
20 TABLET, FILM COATED ORAL 2 TIMES DAILY
Status: DISCONTINUED | OUTPATIENT
Start: 2020-10-09 | End: 2020-10-10 | Stop reason: HOSPADM

## 2020-10-09 RX ORDER — SODIUM CHLORIDE, SODIUM LACTATE, POTASSIUM CHLORIDE, CALCIUM CHLORIDE 600; 310; 30; 20 MG/100ML; MG/100ML; MG/100ML; MG/100ML
25 INJECTION, SOLUTION INTRAVENOUS CONTINUOUS
Status: DISCONTINUED | OUTPATIENT
Start: 2020-10-09 | End: 2020-10-09 | Stop reason: HOSPADM

## 2020-10-09 RX ORDER — IBUPROFEN 200 MG
1 TABLET ORAL DAILY
Status: DISCONTINUED | OUTPATIENT
Start: 2020-10-09 | End: 2020-10-10 | Stop reason: HOSPADM

## 2020-10-09 RX ORDER — DIPHENHYDRAMINE HYDROCHLORIDE 50 MG/ML
12.5 INJECTION, SOLUTION INTRAMUSCULAR; INTRAVENOUS AS NEEDED
Status: DISCONTINUED | OUTPATIENT
Start: 2020-10-09 | End: 2020-10-09 | Stop reason: HOSPADM

## 2020-10-09 RX ORDER — PROCHLORPERAZINE EDISYLATE 5 MG/ML
INJECTION INTRAMUSCULAR; INTRAVENOUS
Status: DISPENSED
Start: 2020-10-09 | End: 2020-10-10

## 2020-10-09 RX ORDER — HYDROMORPHONE HYDROCHLORIDE 1 MG/ML
1 INJECTION, SOLUTION INTRAMUSCULAR; INTRAVENOUS; SUBCUTANEOUS ONCE
Status: COMPLETED | OUTPATIENT
Start: 2020-10-09 | End: 2020-10-09

## 2020-10-09 RX ORDER — SODIUM CHLORIDE 0.9 % (FLUSH) 0.9 %
5-40 SYRINGE (ML) INJECTION EVERY 8 HOURS
Status: DISCONTINUED | OUTPATIENT
Start: 2020-10-09 | End: 2020-10-09 | Stop reason: HOSPADM

## 2020-10-09 RX ORDER — SODIUM CHLORIDE 0.9 % (FLUSH) 0.9 %
5-40 SYRINGE (ML) INJECTION EVERY 8 HOURS
Status: DISCONTINUED | OUTPATIENT
Start: 2020-10-09 | End: 2020-10-10 | Stop reason: HOSPADM

## 2020-10-09 RX ORDER — SODIUM CHLORIDE 9 MG/ML
125 INJECTION, SOLUTION INTRAVENOUS CONTINUOUS
Status: DISCONTINUED | OUTPATIENT
Start: 2020-10-09 | End: 2020-10-10 | Stop reason: HOSPADM

## 2020-10-09 RX ORDER — FENTANYL CITRATE 50 UG/ML
INJECTION, SOLUTION INTRAMUSCULAR; INTRAVENOUS
Status: COMPLETED
Start: 2020-10-09 | End: 2020-10-09

## 2020-10-09 RX ORDER — ONDANSETRON 2 MG/ML
4 INJECTION INTRAMUSCULAR; INTRAVENOUS ONCE
Status: ACTIVE | OUTPATIENT
Start: 2020-10-09 | End: 2020-10-10

## 2020-10-09 RX ORDER — DEXAMETHASONE SODIUM PHOSPHATE 4 MG/ML
INJECTION, SOLUTION INTRA-ARTICULAR; INTRALESIONAL; INTRAMUSCULAR; INTRAVENOUS; SOFT TISSUE AS NEEDED
Status: DISCONTINUED | OUTPATIENT
Start: 2020-10-09 | End: 2020-10-09 | Stop reason: HOSPADM

## 2020-10-09 RX ORDER — MORPHINE SULFATE 10 MG/ML
2 INJECTION, SOLUTION INTRAMUSCULAR; INTRAVENOUS
Status: DISCONTINUED | OUTPATIENT
Start: 2020-10-09 | End: 2020-10-09 | Stop reason: HOSPADM

## 2020-10-09 RX ORDER — SODIUM CHLORIDE, SODIUM LACTATE, POTASSIUM CHLORIDE, CALCIUM CHLORIDE 600; 310; 30; 20 MG/100ML; MG/100ML; MG/100ML; MG/100ML
INJECTION, SOLUTION INTRAVENOUS
Status: DISCONTINUED | OUTPATIENT
Start: 2020-10-09 | End: 2020-10-09 | Stop reason: HOSPADM

## 2020-10-09 RX ORDER — OXYCODONE AND ACETAMINOPHEN 5; 325 MG/1; MG/1
1 TABLET ORAL
Qty: 30 TAB | Refills: 0 | Status: SHIPPED | OUTPATIENT
Start: 2020-10-09 | End: 2020-10-16

## 2020-10-09 RX ORDER — HYDROXYZINE HYDROCHLORIDE 10 MG/1
10 TABLET, FILM COATED ORAL
Status: DISCONTINUED | OUTPATIENT
Start: 2020-10-09 | End: 2020-10-10 | Stop reason: HOSPADM

## 2020-10-09 RX ORDER — FENTANYL CITRATE 50 UG/ML
INJECTION, SOLUTION INTRAMUSCULAR; INTRAVENOUS AS NEEDED
Status: DISCONTINUED | OUTPATIENT
Start: 2020-10-09 | End: 2020-10-09 | Stop reason: HOSPADM

## 2020-10-09 RX ORDER — POLYETHYLENE GLYCOL 3350 17 G/17G
17 POWDER, FOR SOLUTION ORAL DAILY
Status: DISCONTINUED | OUTPATIENT
Start: 2020-10-10 | End: 2020-10-10 | Stop reason: HOSPADM

## 2020-10-09 RX ORDER — SODIUM CHLORIDE 9 MG/ML
INJECTION INTRAMUSCULAR; INTRAVENOUS; SUBCUTANEOUS
Status: COMPLETED
Start: 2020-10-09 | End: 2020-10-09

## 2020-10-09 RX ORDER — FENTANYL CITRATE 50 UG/ML
75 INJECTION, SOLUTION INTRAMUSCULAR; INTRAVENOUS
Status: COMPLETED | OUTPATIENT
Start: 2020-10-09 | End: 2020-10-09

## 2020-10-09 RX ORDER — PROPOFOL 10 MG/ML
INJECTION, EMULSION INTRAVENOUS AS NEEDED
Status: DISCONTINUED | OUTPATIENT
Start: 2020-10-09 | End: 2020-10-09 | Stop reason: HOSPADM

## 2020-10-09 RX ORDER — FLUOXETINE HYDROCHLORIDE 20 MG/1
20 CAPSULE ORAL DAILY
Status: DISCONTINUED | OUTPATIENT
Start: 2020-10-10 | End: 2020-10-10 | Stop reason: HOSPADM

## 2020-10-09 RX ORDER — HYDROMORPHONE HYDROCHLORIDE 1 MG/ML
INJECTION, SOLUTION INTRAMUSCULAR; INTRAVENOUS; SUBCUTANEOUS
Status: COMPLETED
Start: 2020-10-09 | End: 2020-10-09

## 2020-10-09 RX ORDER — MORPHINE SULFATE 2 MG/ML
2 INJECTION, SOLUTION INTRAMUSCULAR; INTRAVENOUS
Status: DISCONTINUED | OUTPATIENT
Start: 2020-10-09 | End: 2020-10-10 | Stop reason: HOSPADM

## 2020-10-09 RX ORDER — FACIAL-BODY WIPES
10 EACH TOPICAL DAILY PRN
Status: DISCONTINUED | OUTPATIENT
Start: 2020-10-11 | End: 2020-10-10 | Stop reason: HOSPADM

## 2020-10-09 RX ORDER — KETAMINE HYDROCHLORIDE 10 MG/ML
25 INJECTION, SOLUTION INTRAMUSCULAR; INTRAVENOUS ONCE
Status: COMPLETED | OUTPATIENT
Start: 2020-10-09 | End: 2020-10-09

## 2020-10-09 RX ORDER — SODIUM CHLORIDE 0.9 % (FLUSH) 0.9 %
5-40 SYRINGE (ML) INJECTION AS NEEDED
Status: CANCELLED | OUTPATIENT
Start: 2020-10-09

## 2020-10-09 RX ORDER — HYDROMORPHONE HYDROCHLORIDE 1 MG/ML
.2-.5 INJECTION, SOLUTION INTRAMUSCULAR; INTRAVENOUS; SUBCUTANEOUS
Status: DISCONTINUED | OUTPATIENT
Start: 2020-10-09 | End: 2020-10-09 | Stop reason: HOSPADM

## 2020-10-09 RX ORDER — LIDOCAINE HYDROCHLORIDE 20 MG/ML
INJECTION, SOLUTION EPIDURAL; INFILTRATION; INTRACAUDAL; PERINEURAL AS NEEDED
Status: DISCONTINUED | OUTPATIENT
Start: 2020-10-09 | End: 2020-10-09 | Stop reason: HOSPADM

## 2020-10-09 RX ORDER — SODIUM CHLORIDE 0.9 % (FLUSH) 0.9 %
5-40 SYRINGE (ML) INJECTION EVERY 8 HOURS
Status: CANCELLED | OUTPATIENT
Start: 2020-10-09

## 2020-10-09 RX ORDER — HYDROMORPHONE HYDROCHLORIDE 2 MG/ML
INJECTION, SOLUTION INTRAMUSCULAR; INTRAVENOUS; SUBCUTANEOUS AS NEEDED
Status: DISCONTINUED | OUTPATIENT
Start: 2020-10-09 | End: 2020-10-09 | Stop reason: HOSPADM

## 2020-10-09 RX ORDER — OXYCODONE HYDROCHLORIDE 5 MG/1
5 TABLET ORAL
Status: DISCONTINUED | OUTPATIENT
Start: 2020-10-09 | End: 2020-10-10 | Stop reason: HOSPADM

## 2020-10-09 RX ORDER — ACETAMINOPHEN 500 MG
1000 TABLET ORAL EVERY 6 HOURS
Status: DISCONTINUED | OUTPATIENT
Start: 2020-10-10 | End: 2020-10-10 | Stop reason: HOSPADM

## 2020-10-09 RX ORDER — ONDANSETRON 2 MG/ML
INJECTION INTRAMUSCULAR; INTRAVENOUS AS NEEDED
Status: DISCONTINUED | OUTPATIENT
Start: 2020-10-09 | End: 2020-10-09 | Stop reason: HOSPADM

## 2020-10-09 RX ORDER — SODIUM CHLORIDE, SODIUM LACTATE, POTASSIUM CHLORIDE, CALCIUM CHLORIDE 600; 310; 30; 20 MG/100ML; MG/100ML; MG/100ML; MG/100ML
50 INJECTION, SOLUTION INTRAVENOUS CONTINUOUS
Status: DISCONTINUED | OUTPATIENT
Start: 2020-10-09 | End: 2020-10-09 | Stop reason: HOSPADM

## 2020-10-09 RX ORDER — NEOSTIGMINE METHYLSULFATE 1 MG/ML
INJECTION, SOLUTION INTRAVENOUS AS NEEDED
Status: DISCONTINUED | OUTPATIENT
Start: 2020-10-09 | End: 2020-10-09 | Stop reason: HOSPADM

## 2020-10-09 RX ORDER — OXYCODONE HYDROCHLORIDE 5 MG/1
10 TABLET ORAL
Status: DISCONTINUED | OUTPATIENT
Start: 2020-10-09 | End: 2020-10-10 | Stop reason: HOSPADM

## 2020-10-09 RX ORDER — SODIUM CHLORIDE 0.9 % (FLUSH) 0.9 %
5-40 SYRINGE (ML) INJECTION AS NEEDED
Status: DISCONTINUED | OUTPATIENT
Start: 2020-10-09 | End: 2020-10-09 | Stop reason: HOSPADM

## 2020-10-09 RX ORDER — MIDAZOLAM HYDROCHLORIDE 1 MG/ML
INJECTION, SOLUTION INTRAMUSCULAR; INTRAVENOUS AS NEEDED
Status: DISCONTINUED | OUTPATIENT
Start: 2020-10-09 | End: 2020-10-09 | Stop reason: HOSPADM

## 2020-10-09 RX ORDER — ROCURONIUM BROMIDE 10 MG/ML
INJECTION, SOLUTION INTRAVENOUS AS NEEDED
Status: DISCONTINUED | OUTPATIENT
Start: 2020-10-09 | End: 2020-10-09 | Stop reason: HOSPADM

## 2020-10-09 RX ORDER — GLYCOPYRROLATE 0.2 MG/ML
INJECTION INTRAMUSCULAR; INTRAVENOUS AS NEEDED
Status: DISCONTINUED | OUTPATIENT
Start: 2020-10-09 | End: 2020-10-09 | Stop reason: HOSPADM

## 2020-10-09 RX ORDER — AMOXICILLIN 250 MG
1 CAPSULE ORAL 2 TIMES DAILY
Status: DISCONTINUED | OUTPATIENT
Start: 2020-10-09 | End: 2020-10-10 | Stop reason: HOSPADM

## 2020-10-09 RX ORDER — SODIUM CHLORIDE 0.9 % (FLUSH) 0.9 %
5-40 SYRINGE (ML) INJECTION AS NEEDED
Status: DISCONTINUED | OUTPATIENT
Start: 2020-10-09 | End: 2020-10-10 | Stop reason: HOSPADM

## 2020-10-09 RX ORDER — SUCCINYLCHOLINE CHLORIDE 20 MG/ML
INJECTION INTRAMUSCULAR; INTRAVENOUS AS NEEDED
Status: DISCONTINUED | OUTPATIENT
Start: 2020-10-09 | End: 2020-10-09 | Stop reason: HOSPADM

## 2020-10-09 RX ORDER — ONDANSETRON 2 MG/ML
4 INJECTION INTRAMUSCULAR; INTRAVENOUS AS NEEDED
Status: DISCONTINUED | OUTPATIENT
Start: 2020-10-09 | End: 2020-10-09 | Stop reason: HOSPADM

## 2020-10-09 RX ORDER — FENTANYL CITRATE 50 UG/ML
25 INJECTION, SOLUTION INTRAMUSCULAR; INTRAVENOUS
Status: DISCONTINUED | OUTPATIENT
Start: 2020-10-09 | End: 2020-10-09 | Stop reason: HOSPADM

## 2020-10-09 RX ORDER — ROPIVACAINE HYDROCHLORIDE 5 MG/ML
INJECTION, SOLUTION EPIDURAL; INFILTRATION; PERINEURAL AS NEEDED
Status: DISCONTINUED | OUTPATIENT
Start: 2020-10-09 | End: 2020-10-09 | Stop reason: HOSPADM

## 2020-10-09 RX ORDER — NALOXONE HYDROCHLORIDE 0.4 MG/ML
0.4 INJECTION, SOLUTION INTRAMUSCULAR; INTRAVENOUS; SUBCUTANEOUS AS NEEDED
Status: DISCONTINUED | OUTPATIENT
Start: 2020-10-09 | End: 2020-10-10 | Stop reason: HOSPADM

## 2020-10-09 RX ADMIN — FENTANYL CITRATE 50 MCG: 50 INJECTION, SOLUTION INTRAMUSCULAR; INTRAVENOUS at 16:39

## 2020-10-09 RX ADMIN — FENTANYL CITRATE 25 MCG: 50 INJECTION, SOLUTION INTRAMUSCULAR; INTRAVENOUS at 18:45

## 2020-10-09 RX ADMIN — ROCURONIUM BROMIDE 5 MG: 10 INJECTION INTRAVENOUS at 17:23

## 2020-10-09 RX ADMIN — HYDROMORPHONE HYDROCHLORIDE 0.6 MG: 2 INJECTION, SOLUTION INTRAMUSCULAR; INTRAVENOUS; SUBCUTANEOUS at 16:44

## 2020-10-09 RX ADMIN — WATER 2 G: 1 INJECTION INTRAMUSCULAR; INTRAVENOUS; SUBCUTANEOUS at 16:41

## 2020-10-09 RX ADMIN — FENTANYL CITRATE 75 MCG: 50 INJECTION, SOLUTION INTRAMUSCULAR; INTRAVENOUS at 13:13

## 2020-10-09 RX ADMIN — PROCHLORPERAZINE EDISYLATE 5 MG: 5 INJECTION INTRAMUSCULAR; INTRAVENOUS at 19:04

## 2020-10-09 RX ADMIN — ROCURONIUM BROMIDE 20 MG: 10 INJECTION INTRAVENOUS at 16:42

## 2020-10-09 RX ADMIN — LIDOCAINE HYDROCHLORIDE 80 MG: 20 INJECTION, SOLUTION EPIDURAL; INFILTRATION; INTRACAUDAL; PERINEURAL at 16:35

## 2020-10-09 RX ADMIN — OXYCODONE 10 MG: 5 TABLET ORAL at 19:55

## 2020-10-09 RX ADMIN — ROCURONIUM BROMIDE 10 MG: 10 INJECTION INTRAVENOUS at 17:33

## 2020-10-09 RX ADMIN — FENTANYL CITRATE 25 MCG: 50 INJECTION, SOLUTION INTRAMUSCULAR; INTRAVENOUS at 19:15

## 2020-10-09 RX ADMIN — GLYCOPYRROLATE 0.4 MG: 0.2 INJECTION, SOLUTION INTRAMUSCULAR; INTRAVENOUS at 17:57

## 2020-10-09 RX ADMIN — Medication 3 MG: at 17:57

## 2020-10-09 RX ADMIN — HYDROMORPHONE HYDROCHLORIDE 1 MG: 1 INJECTION, SOLUTION INTRAMUSCULAR; INTRAVENOUS; SUBCUTANEOUS at 15:10

## 2020-10-09 RX ADMIN — DEXAMETHASONE SODIUM PHOSPHATE 8 MG: 4 INJECTION, SOLUTION INTRAMUSCULAR; INTRAVENOUS at 16:42

## 2020-10-09 RX ADMIN — Medication 10 ML: at 19:14

## 2020-10-09 RX ADMIN — FENTANYL CITRATE 50 MCG: 50 INJECTION, SOLUTION INTRAMUSCULAR; INTRAVENOUS at 16:35

## 2020-10-09 RX ADMIN — SODIUM CHLORIDE 125 ML/HR: 900 INJECTION, SOLUTION INTRAVENOUS at 19:00

## 2020-10-09 RX ADMIN — HYDROMORPHONE HYDROCHLORIDE 0.5 MG: 1 INJECTION, SOLUTION INTRAMUSCULAR; INTRAVENOUS; SUBCUTANEOUS at 19:30

## 2020-10-09 RX ADMIN — HYDROMORPHONE HYDROCHLORIDE 0.4 MG: 2 INJECTION, SOLUTION INTRAMUSCULAR; INTRAVENOUS; SUBCUTANEOUS at 18:42

## 2020-10-09 RX ADMIN — Medication 10 ML: at 21:57

## 2020-10-09 RX ADMIN — DOCUSATE SODIUM 50MG AND SENNOSIDES 8.6MG 1 TABLET: 8.6; 5 TABLET, FILM COATED ORAL at 21:56

## 2020-10-09 RX ADMIN — Medication 3 AMPULE: at 16:08

## 2020-10-09 RX ADMIN — KETAMINE HYDROCHLORIDE 25 MG: 10 INJECTION, SOLUTION INTRAMUSCULAR; INTRAVENOUS at 14:56

## 2020-10-09 RX ADMIN — SODIUM CHLORIDE 10 ML: 9 INJECTION, SOLUTION INTRAMUSCULAR; INTRAVENOUS; SUBCUTANEOUS at 19:14

## 2020-10-09 RX ADMIN — HYDROMORPHONE HYDROCHLORIDE 0.5 MG: 1 INJECTION, SOLUTION INTRAMUSCULAR; INTRAVENOUS; SUBCUTANEOUS at 19:00

## 2020-10-09 RX ADMIN — ROCURONIUM BROMIDE 10 MG: 10 INJECTION INTRAVENOUS at 16:54

## 2020-10-09 RX ADMIN — ONDANSETRON HYDROCHLORIDE 4 MG: 2 INJECTION, SOLUTION INTRAMUSCULAR; INTRAVENOUS at 17:56

## 2020-10-09 RX ADMIN — SUCCINYLCHOLINE CHLORIDE 120 MG: 20 INJECTION, SOLUTION INTRAMUSCULAR; INTRAVENOUS at 16:35

## 2020-10-09 RX ADMIN — SODIUM CHLORIDE, SODIUM LACTATE, POTASSIUM CHLORIDE, AND CALCIUM CHLORIDE 50 ML/HR: 600; 310; 30; 20 INJECTION, SOLUTION INTRAVENOUS at 16:08

## 2020-10-09 RX ADMIN — FENTANYL CITRATE 25 MCG: 50 INJECTION, SOLUTION INTRAMUSCULAR; INTRAVENOUS at 19:05

## 2020-10-09 RX ADMIN — FENTANYL CITRATE 25 MCG: 50 INJECTION, SOLUTION INTRAMUSCULAR; INTRAVENOUS at 19:10

## 2020-10-09 RX ADMIN — MIDAZOLAM HYDROCHLORIDE 2 MG: 1 INJECTION, SOLUTION INTRAMUSCULAR; INTRAVENOUS at 16:29

## 2020-10-09 RX ADMIN — HYDROMORPHONE HYDROCHLORIDE 0.2 MG: 2 INJECTION, SOLUTION INTRAMUSCULAR; INTRAVENOUS; SUBCUTANEOUS at 17:41

## 2020-10-09 RX ADMIN — FAMOTIDINE 20 MG: 20 TABLET, FILM COATED ORAL at 21:56

## 2020-10-09 RX ADMIN — SODIUM CHLORIDE, POTASSIUM CHLORIDE, SODIUM LACTATE AND CALCIUM CHLORIDE: 600; 310; 30; 20 INJECTION, SOLUTION INTRAVENOUS at 16:19

## 2020-10-09 RX ADMIN — FENTANYL CITRATE 25 MCG: 50 INJECTION, SOLUTION INTRAMUSCULAR; INTRAVENOUS at 18:50

## 2020-10-09 RX ADMIN — FENTANYL CITRATE 25 MCG: 50 INJECTION, SOLUTION INTRAMUSCULAR; INTRAVENOUS at 19:00

## 2020-10-09 RX ADMIN — PROPOFOL 200 MG: 10 INJECTION, EMULSION INTRAVENOUS at 16:35

## 2020-10-09 RX ADMIN — FENTANYL CITRATE 75 MCG: 50 INJECTION, SOLUTION INTRAMUSCULAR; INTRAVENOUS at 12:26

## 2020-10-09 RX ADMIN — HYDROMORPHONE HYDROCHLORIDE 0.4 MG: 2 INJECTION, SOLUTION INTRAMUSCULAR; INTRAVENOUS; SUBCUTANEOUS at 18:36

## 2020-10-09 RX ADMIN — HYDROMORPHONE HYDROCHLORIDE 0.5 MG: 1 INJECTION, SOLUTION INTRAMUSCULAR; INTRAVENOUS; SUBCUTANEOUS at 19:45

## 2020-10-09 RX ADMIN — ROCURONIUM BROMIDE 10 MG: 10 INJECTION INTRAVENOUS at 16:35

## 2020-10-09 RX ADMIN — FENTANYL CITRATE 25 MCG: 50 INJECTION, SOLUTION INTRAMUSCULAR; INTRAVENOUS at 18:55

## 2020-10-09 RX ADMIN — HYDROMORPHONE HYDROCHLORIDE 0.5 MG: 1 INJECTION, SOLUTION INTRAMUSCULAR; INTRAVENOUS; SUBCUTANEOUS at 19:15

## 2020-10-09 RX ADMIN — HYDROMORPHONE HYDROCHLORIDE 0.4 MG: 2 INJECTION, SOLUTION INTRAMUSCULAR; INTRAVENOUS; SUBCUTANEOUS at 16:55

## 2020-10-09 NOTE — DISCHARGE INSTRUCTIONS
Follow up appointments  Call Mina Rodríguez at [phone] to schedule follow up appt with Dr. David Walker in  10 - 14 days. When to call your Orthopaedic Surgeon:  -unrelieved pain  -Signs of infection-if your incision is red; continues to have drainage; drainage has a foul odor or if you have a persistent fever over 101 degrees  -Signs of a blood clot in your leg-calf pain, tenderness, redness, swelling of lower leg  When to call your Primary Care Physician:  -Concerns about medical conditions such as diabetes, high blood pressure, asthma, congestive heart failure  -Call if blood sugars are elevated, persistent headache or dizziness, coughing or congestion, constipation or diarrhea, burning with urination, abnormal heart rate  When to call 911and go to the nearest emergency room  -acute onset of chest pain, shortness of breath, difficulty breathing    Activity - no weight bearing on left arm. You may work on elbow range of motion. No lifting pushing or pulling. Ice and elevate    Incision Care - keep dressing and splint clean and dry    Preventing blood clots - ambulate daily      Pain management  -take pain medication as prescribed; decrease the amount you use as your pain lessens  -avoid alcoholic beverages while taking pain medication  -Please be aware that many medications contain Tylenol. We do not want you to over medicate so please read the information below as a guide. Do not take more than 4 Grams of Tylenol in a 24 hour period. (There are 1000 milligrams in one Gram)  Percocet contains 325 mg of Tylenol per tablet (do not take more than 12 tablets in 24 hours)  Lortab contains 500 mg of Tylenol per tablet (do not take more than 8 tablets in 24 hours)  Norco contains 325 mg of Tylenol per tablet (do not take more than 12 tablets in 24 hours).   -You may place an ice bag on your affected extremity     Diet  -resume usual diet; drink plenty of fluids; eat foods high in fiber  -you may want to take a stool softener (such as Senokot-S or Colace) to prevent constipation while you are taking pain medication.   If constipation occurs, take a laxative (such as Dulcolax tablets, Milk of Magnesia, or a suppository)

## 2020-10-09 NOTE — ANESTHESIA PREPROCEDURE EVALUATION
Relevant Problems   No relevant active problems       Anesthetic History               Review of Systems / Medical History      Pulmonary          Smoker         Neuro/Psych     seizures    Psychiatric history     Cardiovascular                  Exercise tolerance: >4 METS     GI/Hepatic/Renal                Endo/Other             Other Findings   Comments: Smoking Status: Current Every Day Smoker - 3 pack years   Smokeless Tobacco Status: Never Used   Alcohol use: Yes; 10.0 standard drinks per week   Drug use: Marijuana            Physical Exam    Airway  Mallampati: II  TM Distance: 4 - 6 cm  Neck ROM: normal range of motion   Mouth opening: Normal     Cardiovascular  Regular rate and rhythm,  S1 and S2 normal,  no murmur, click, rub, or gallop             Dental  No notable dental hx       Pulmonary  Breath sounds clear to auscultation               Abdominal  GI exam deferred       Other Findings            Anesthetic Plan    ASA: 2  Anesthesia type: general

## 2020-10-09 NOTE — PERIOP NOTES
TRANSFER - IN REPORT:    Verbal report received from SIOMARA Reynaga RN(name) on Brina Rose  being received from OR(unit) for routine post - op      Report consisted of patients Situation, Background, Assessment and   Recommendations(SBAR). Information from the following report(s) OR Summary was reviewed with the receiving nurse. Opportunity for questions and clarification was provided. Assessment completed upon patients arrival to unit and care assumed.

## 2020-10-09 NOTE — H&P
H&P/CONSULT    Subjective:     Date of Consultation:  October 9, 2020    Referring Physician:  ER    Claudia Elmore is a 34 y.o.  female who is being seen for left arm pain. Workup has revealed both bone forearm fx after a fall. Notes pain with any motion. No other injuries. No LOC. Patient Active Problem List    Diagnosis Date Noted    Major depressive disorder, recurrent episode, moderate degree ([address] 10/05/2017    Depression 10/04/2017    Abnormal Pap smear      Family History   Problem Relation Age of Onset    Hypertension Father       Social History     Tobacco Use    Smoking status: Current Every Day Smoker     Packs/day: 0.50     Years: 6.00     Pack years: 3.00     Types: Cigarettes    Smokeless tobacco: Never Used   Substance Use Topics    Alcohol use: Yes     Alcohol/week: 6.0 standard drinks     Types: 6 Cans of beer per week     Comment: drinks daily     Past Medical History:   Diagnosis Date    Abnormal Pap smear     Anxiety     Routine gynecological examination     [facility]      Past Surgical History:   Procedure Laterality Date    HX GYN      3 c/s    HX TONSILLECTOMY        Prior to Admission medications    Medication Sig Start Date End Date Taking? Authorizing Provider   FLUoxetine (PROZAC) 20 mg capsule Take 1 Cap by mouth daily. Indications: Generalized Anxiety Disorder, major depressive disorder 10/9/17  Yes Marion Garsia MD     Current Facility-Administered Medications   Medication Dose Route Frequency    ondansetron (ZOFRAN) injection 4 mg  4 mg IntraVENous ONCE    ceFAZolin (ANCEF) 2 g in sterile water (preservative free) 20 mL IV syringe  2 g IntraVENous ON CALL TO OR    lactated Ringers infusion  50 mL/hr IntraVENous CONTINUOUS     No Known Allergies     Review of Systems:  A comprehensive review of systems was negative.     Objective:     Patient Vitals for the past 8 hrs:   BP Temp Pulse Resp SpO2 Height Weight   10/09/20 1558 (!) 135/90 98.4 °F (36.9 °C) 77 15 96 %     10/09/20 1330 137/89    96 %     10/09/20 1300 137/88    99 %     10/09/20 1233 (!) 136/90         10/09/20 1215 138/89 100 °F (37.8 °C) 89 20 100 % 5' 3\" (1.6 m) 68 kg (150 lb)     Temp (24hrs), Av.2 °F (37.3 °C), Min:98.4 °F (36.9 °C), Max:100 °F (37.8 °C)        Physical Exam:  General:     Alert and oriented. No acute distress. Chest:     Respirations unlabored. Abdomen:     Extremities:   Soft, non-tender. No evidence of cyanosis. Pulses palpable in both upper and lower extremities. Boston's sign negative in bilateral lower extremities. Moving all extremities. Pain with ROM L elbow/wrist.  Grossly nvsi. Splinted currently. +deformity forearm       Neurologic:  No motor deficits. Sensation stable. Neurovascular exam within normal limits. Data Review   Recent Results (from the past 24 hour(s))   CBC WITH AUTOMATED DIFF    Collection Time: 10/09/20  2:00 PM   Result Value Ref Range    WBC 12.9 (H) 3.6 - 11.0 K/uL    RBC 4.46 3.80 - 5.20 M/uL    HGB 13.9 11.5 - 16.0 g/dL    HCT 40.4 35.0 - 47.0 %    MCV 90.6 80.0 - 99.0 FL    MCH 31.2 26.0 - 34.0 PG    MCHC 34.4 30.0 - 36.5 g/dL    RDW 13.2 11.5 - 14.5 %    PLATELET 674 641 - 407 K/uL    MPV 10.9 8.9 - 12.9 FL    NRBC 0.0 0  WBC    ABSOLUTE NRBC 0.00 0.00 - 0.01 K/uL    NEUTROPHILS 81 (H) 32 - 75 %    LYMPHOCYTES 14 12 - 49 %    MONOCYTES 4 (L) 5 - 13 %    EOSINOPHILS 1 0 - 7 %    BASOPHILS 0 0 - 1 %    IMMATURE GRANULOCYTES 0 0.0 - 0.5 %    ABS. NEUTROPHILS 10.4 (H) 1.8 - 8.0 K/UL    ABS. LYMPHOCYTES 1.8 0.8 - 3.5 K/UL    ABS. MONOCYTES 0.6 0.0 - 1.0 K/UL    ABS. EOSINOPHILS 0.1 0.0 - 0.4 K/UL    ABS. BASOPHILS 0.0 0.0 - 0.1 K/UL    ABS. IMM.  GRANS. 0.1 (H) 0.00 - 0.04 K/UL    DF AUTOMATED     METABOLIC PANEL, COMPREHENSIVE    Collection Time: 10/09/20  2:00 PM   Result Value Ref Range    Sodium 139 136 - 145 mmol/L    Potassium 3.8 3.5 - 5.1 mmol/L    Chloride 108 97 - 108 mmol/L CO2 27 21 - 32 mmol/L    Anion gap 4 (L) 5 - 15 mmol/L    Glucose 105 (H) 65 - 100 mg/dL    BUN 11 6 - 20 MG/DL    Creatinine 0.86 0.55 - 1.02 MG/DL    BUN/Creatinine ratio 13 12 - 20      GFR est AA >60 >60 ml/min/1.73m2    GFR est non-AA >60 >60 ml/min/1.73m2    Calcium 8.6 8.5 - 10.1 MG/DL    Bilirubin, total 0.4 0.2 - 1.0 MG/DL    ALT (SGPT) 18 12 - 78 U/L    AST (SGOT) 14 (L) 15 - 37 U/L    Alk. phosphatase 82 45 - 117 U/L    Protein, total 7.3 6.4 - 8.2 g/dL    Albumin 4.1 3.5 - 5.0 g/dL    Globulin 3.2 2.0 - 4.0 g/dL    A-G Ratio 1.3 1.1 - 2.2     PROTHROMBIN TIME + INR    Collection Time: 10/09/20  2:00 PM   Result Value Ref Range    INR 1.0 0.9 - 1.1      Prothrombin time 10.9 9.0 - 11.1 sec   URINALYSIS W/ REFLEX CULTURE    Collection Time: 10/09/20  3:18 PM    Specimen: Urine   Result Value Ref Range    Color YELLOW/STRAW      Appearance CLOUDY (A) CLEAR      Specific gravity 1.008 1.003 - 1.030      pH (UA) 6.5 5.0 - 8.0      Protein Negative NEG mg/dL    Glucose Negative NEG mg/dL    Ketone Negative NEG mg/dL    Bilirubin Negative NEG      Blood LARGE (A) NEG      Urobilinogen 0.2 0.2 - 1.0 EU/dL    Nitrites Negative NEG      Leukocyte Esterase Negative NEG      WBC PENDING /hpf    RBC PENDING /hpf    Epithelial cells PENDING /lpf    Bacteria PENDING /hpf    UA:UC IF INDICATED PENDING    HCG URINE, QL. - POC    Collection Time: 10/09/20  3:48 PM   Result Value Ref Range    Pregnancy test,urine (POC) Negative NEG       Xr Forearm Lt Ap/lat    Result Date: 10/9/2020  IMPRESSION: Radial and ulnar shaft fractures. Xr Chest Port    Result Date: 10/9/2020  IMPRESSION: No acute cardiopulmonary disease. Assessment/Plan:     L forearm radius and ulnar shaft fractures    Pain control  Npo  Cleared  Discussed ORIF L forearm fxs. The risks of surgery were explained. Risks of infection, blood loss, neurovascular injury, anesthesia risks, and risks secondary to patient comorbidities were explained. The patient was counseled at length about the risks of jaquan Covid-19 during their perioperative period and any recovery window from their procedure. The patient was made aware that jaquan Covid-19  may worsen their prognosis for recovering from their procedure and lend to a higher morbidity and/or mortality risk. All material risks, benefits, and reasonable alternatives including postponing the procedure were discussed. The patient does  wish to proceed with the procedure at this time.           Jessica Pack, DO

## 2020-10-09 NOTE — CONSULTS
Orthopedic CONSULT NOTE    Subjective:     Date of Consultation:  October 9, 2020      Kole Reyes is a 34 y.o. female who is being seen for left forearm pain and deformity after fall. Injury occurred  today while Pt. was at home. Workup has revealed fracture radius and ulna mid shaft . Patient's ambulatory status includes None and their living environment is home. Pt. Denies head trauma/LOC during injury. Pt. Is right hand dominant. Pt. Last meal was this morning. Patient Active Problem List    Diagnosis Date Noted    Left forearm fracture 10/09/2020    Major depressive disorder, recurrent episode, moderate degree ([facility] [address] 10/05/2017    Depression 10/04/2017    Abnormal Pap smear      Family History   Problem Relation Age of Onset    Hypertension Father       Social History     Tobacco Use    Smoking status: Current Every Day Smoker     Packs/day: 0.50     Years: 6.00     Pack years: 3.00     Types: Cigarettes    Smokeless tobacco: Never Used   Substance Use Topics    Alcohol use: Yes     Alcohol/week: 6.0 standard drinks     Types: 6 Cans of beer per week     Comment: drinks daily     Past Medical History:   Diagnosis Date    Abnormal Pap smear     Anxiety     Routine gynecological examination     [facility]      Past Surgical History:   Procedure Laterality Date    HX GYN      3 c/s    HX TONSILLECTOMY        Prior to Admission medications    Medication Sig Start Date End Date Taking? Authorizing Provider   FLUoxetine (PROZAC) 20 mg capsule Take 1 Cap by mouth daily.  Indications: Generalized Anxiety Disorder, major depressive disorder 10/9/17  Yes Romelia Maya MD     Current Facility-Administered Medications   Medication Dose Route Frequency    ondansetron (ZOFRAN) injection 4 mg  4 mg IntraVENous ONCE    lactated Ringers infusion  50 mL/hr IntraVENous CONTINUOUS     Facility-Administered Medications Ordered in Other Encounters   Medication Dose Route Frequency    lactated Ringers infusion   IntraVENous CONTINUOUS    midazolam (VERSED) injection   IntraVENous PRN    fentaNYL citrate (PF) injection   IntraVENous PRN    lidocaine (PF) (XYLOCAINE) 20 mg/mL (2 %) injection   IntraVENous PRN    rocuronium injection   IntraVENous PRN    propofoL (DIPRIVAN) 10 mg/mL injection   IntraVENous PRN    succinylcholine (ANECTINE) injection   IntraVENous PRN    dexamethasone (DECADRON) 4 mg/mL injection   IntraVENous PRN    HYDROmorphone (PF) (DILAUDID) injection   IntraVENous PRN      No Known Allergies     Review of Systems:  A comprehensive review of systems was negative except for that written in the HPI. Mental Status: no dementia    Objective:     Patient Vitals for the past 8 hrs:   BP Temp Pulse Resp SpO2 Height Weight   10/09/20 1558 (!) 135/90 98.4 °F (36.9 °C) 77 15 96 %     10/09/20 1330 137/89    96 %     10/09/20 1300 137/88    99 %     10/09/20 1233 (!) 136/90         10/09/20 1215 138/89 100 °F (37.8 °C) 89 20 100 % 5' 3\" (1.6 m) 68 kg (150 lb)     Temp (24hrs), Av.2 °F (37.3 °C), Min:98.4 °F (36.9 °C), Max:100 °F (37.8 °C)      EXAM: alert, cooperative, no distress, oriented, normal, normal mood, clear to auscultation bilaterally, regular rate and rhythm,  Pt. Is TTP over left forearm with swelling and deformity. nvi distally    Imaging Review: XRStudy Result     EXAM: XR FOREARM LT AP/LAT     INDICATION: left proximal forearm deformity after fall in laundry room.     COMPARISON: None.     FINDINGS: Two views of the left radius and ulna demonstrate transversely  oriented fractures through the radial and ulnar shafts with overriding, dorsal  displacement, and radial angulation.     IMPRESSION  IMPRESSION: Radial and ulnar shaft fractures.             Labs:   Recent Results (from the past 24 hour(s))   CBC WITH AUTOMATED DIFF    Collection Time: 10/09/20  2:00 PM   Result Value Ref Range    WBC 12.9 (H) 3.6 - 11.0 K/uL    RBC 4.46 3.80 - 5.20 M/uL    HGB 13.9 11.5 - 16.0 g/dL    HCT 40.4 35.0 - 47.0 %    MCV 90.6 80.0 - 99.0 FL    MCH 31.2 26.0 - 34.0 PG    MCHC 34.4 30.0 - 36.5 g/dL    RDW 13.2 11.5 - 14.5 %    PLATELET 747 909 - 465 K/uL    MPV 10.9 8.9 - 12.9 FL    NRBC 0.0 0  WBC    ABSOLUTE NRBC 0.00 0.00 - 0.01 K/uL    NEUTROPHILS 81 (H) 32 - 75 %    LYMPHOCYTES 14 12 - 49 %    MONOCYTES 4 (L) 5 - 13 %    EOSINOPHILS 1 0 - 7 %    BASOPHILS 0 0 - 1 %    IMMATURE GRANULOCYTES 0 0.0 - 0.5 %    ABS. NEUTROPHILS 10.4 (H) 1.8 - 8.0 K/UL    ABS. LYMPHOCYTES 1.8 0.8 - 3.5 K/UL    ABS. MONOCYTES 0.6 0.0 - 1.0 K/UL    ABS. EOSINOPHILS 0.1 0.0 - 0.4 K/UL    ABS. BASOPHILS 0.0 0.0 - 0.1 K/UL    ABS. IMM. GRANS. 0.1 (H) 0.00 - 0.04 K/UL    DF AUTOMATED     METABOLIC PANEL, COMPREHENSIVE    Collection Time: 10/09/20  2:00 PM   Result Value Ref Range    Sodium 139 136 - 145 mmol/L    Potassium 3.8 3.5 - 5.1 mmol/L    Chloride 108 97 - 108 mmol/L    CO2 27 21 - 32 mmol/L    Anion gap 4 (L) 5 - 15 mmol/L    Glucose 105 (H) 65 - 100 mg/dL    BUN 11 6 - 20 MG/DL    Creatinine 0.86 0.55 - 1.02 MG/DL    BUN/Creatinine ratio 13 12 - 20      GFR est AA >60 >60 ml/min/1.73m2    GFR est non-AA >60 >60 ml/min/1.73m2    Calcium 8.6 8.5 - 10.1 MG/DL    Bilirubin, total 0.4 0.2 - 1.0 MG/DL    ALT (SGPT) 18 12 - 78 U/L    AST (SGOT) 14 (L) 15 - 37 U/L    Alk.  phosphatase 82 45 - 117 U/L    Protein, total 7.3 6.4 - 8.2 g/dL    Albumin 4.1 3.5 - 5.0 g/dL    Globulin 3.2 2.0 - 4.0 g/dL    A-G Ratio 1.3 1.1 - 2.2     PROTHROMBIN TIME + INR    Collection Time: 10/09/20  2:00 PM   Result Value Ref Range    INR 1.0 0.9 - 1.1      Prothrombin time 10.9 9.0 - 11.1 sec   URINALYSIS W/ REFLEX CULTURE    Collection Time: 10/09/20  3:18 PM    Specimen: Urine   Result Value Ref Range    Color YELLOW/STRAW      Appearance CLOUDY (A) CLEAR      Specific gravity 1.008 1.003 - 1.030      pH (UA) 6.5 5.0 - 8.0      Protein Negative NEG mg/dL    Glucose Negative NEG mg/dL    Ketone Negative NEG mg/dL Bilirubin Negative NEG      Blood LARGE (A) NEG      Urobilinogen 0.2 0.2 - 1.0 EU/dL    Nitrites Negative NEG      Leukocyte Esterase Negative NEG      WBC 0-4 0 - 4 /hpf    RBC 5-10 0 - 5 /hpf    Epithelial cells MODERATE (A) FEW /lpf    Bacteria Negative NEG /hpf    UA:UC IF INDICATED CULTURE NOT INDICATED BY UA RESULT CNI     HCG URINE, QL. - POC    Collection Time: 10/09/20  3:48 PM   Result Value Ref Range    Pregnancy test,urine (POC) Negative NEG           Impression:     Patient Active Problem List    Diagnosis Date Noted    Left forearm fracture 10/09/2020    Major depressive disorder, recurrent episode, moderate degree ([facility] [address] 10/05/2017    Depression 10/04/2017    Abnormal Pap smear      Principal Problem:    Left forearm fracture (10/9/2020)        Plan:   Admit ortho  Consent left forearm ORIF  Sugar tong splint placed in ED  Dr. Kala Merlin aware and agree with above plan.       Tamika Tan PA-C

## 2020-10-09 NOTE — BRIEF OP NOTE
Brief Postoperative Note    Patient: Yariel James  YOB: 1990  MRN: [id number]    Date of Procedure: 10/9/2020     Pre-Op Diagnosis: LEFT BOTH BONE FOREARM FRACTURE    Post-Op Diagnosis: Same as preoperative diagnosis. Procedure(s):  OPEN REDUCTION INTERNAL FIXATION LEFT FOREARM/RADIUS AND ULNA    Surgeon(s):  Maryam Gr DO    Surgical Assistant: None    Anesthesia: Other     Estimated Blood Loss (mL): Minimal    Complications: None    Specimens: * No specimens in log *     Implants:   Implant Name Type Inv.  Item Serial No.  Lot No. LRB No. Used Action   PLATE BNE LCP 6H [id number] --  - SN/A Plate PLATE BNE LCP 6H [id number] --  N/A SYNTHES [place] N/A Left 1 Implanted   SCREW BNE L14MM DIA3.5MM TANVI S STL ST FULL THRD SARAH T15 - SN/A Screw SCREW BNE L14MM DIA3.5MM TANVI S STL ST FULL THRD SARHA T15 N/A DEPUY SYNTHES [place] N/A Left 6 Implanted   PLATE BNE LCP 6H [id number] --  - SN/A Plate PLATE BNE LCP 6H [id number] --  N/A SYNTHES [place] N/A Left 1 Implanted   SCREW CORTEX 3.5 X 14MM SMALL - SN/A Screw SCREW CORTEX 3.5 X 14MM SMALL N/A SYNTHES [place] N/A Left 4 Implanted   SCREW BNE L16MM DIA3.5MM TANVI S STL ST SARAH FULL THRD T15 - SN/A Screw SCREW BNE L16MM DIA3.5MM TANVI S STL ST SARAH FULL THRD T15 N/A DEPUY SYNTHES [place] N/A Left 2 Implanted       Drains: * No LDAs found *    Findings: radius/ulna fx    Electronically Signed by Tarik Coreas DO on 10/9/2020 at 6:12 PM

## 2020-10-09 NOTE — ED NOTES
Patient arrives to room from triage w c/o L arm pain. Patient reports she was heading to work when she tripped over something in her laundry room and she suffered a GLF. She arrives w obvious deformity at this time. Patient is on the phone w different people and yelling out at them. 1500- PA Luc to bedside for splinting. 1510- Patient placed on the bedpan for a sample as well as wiped down w CHG wipes at this time. 1540- TRANSFER - OUT REPORT:    Verbal report given to THEA Zarate (name) on Radha Gillespie  being transferred to OR (unit) for routine progression of care       Report consisted of patients Situation, Background, Assessment and   Recommendations(SBAR). Information from the following report(s) SBAR, Kardex and Recent Results was reviewed with the receiving nurse. Lines:   Peripheral IV 10/09/20 Right Antecubital (Active)        Opportunity for questions and clarification was provided.       Patient transported with:   Registered Nurse

## 2020-10-09 NOTE — ANESTHESIA POSTPROCEDURE EVALUATION
Procedure(s):  OPEN REDUCTION INTERNAL FIXATION LEFT FOREARM. general    Anesthesia Post Evaluation        Patient location during evaluation: PACU  Note status: Adequate. Level of consciousness: responsive to verbal stimuli and sleepy but conscious  Pain management: satisfactory to patient  Airway patency: patent  Anesthetic complications: no  Cardiovascular status: acceptable  Respiratory status: acceptable  Hydration status: acceptable  Comments: +Post-Anesthesia Evaluation and Assessment    Patient: Bhanu Tucker MRN: [id number]  SSN: [id number]   YOB: 1990  Age: 34 y.o. Sex: female      Cardiovascular Function/Vital Signs    /78   Pulse 81   Temp 37.3 °C (99.2 °F)   Resp 14   Ht 5' 3\" (1.6 m)   Wt 68 kg (150 lb)   SpO2 95%   BMI 26.57 kg/m²     Patient is status post Procedure(s):  OPEN REDUCTION INTERNAL FIXATION LEFT FOREARM. Nausea/Vomiting: Controlled. Postoperative hydration reviewed and adequate. Pain:  Pain Scale 1: Numeric (0 - 10) (10/09/20 1915)  Pain Intensity 1: 8 (10/09/20 1915)   Managed. Pt currently sleeping per PACU RN report to me. Neurological Status:   Neuro (WDL): Exceptions to WDL (10/09/20 1842)   At baseline. Mental Status and Level of Consciousness: Arousable. Pulmonary Status:   O2 Device: Nasal cannula (10/09/20 1842)   Adequate oxygenation and airway patent. Complications related to anesthesia: None    Post-anesthesia assessment completed. No concerns. Signed By: Wanda Yeboah MD    10/9/2020  Post anesthesia nausea and vomiting:  controlled      INITIAL Post-op Vital signs:   Vitals Value Taken Time   /74 10/9/2020  7:15 PM   Temp 37.3 °C (99.2 °F) 10/9/2020  6:42 PM   Pulse 81 10/9/2020  7:27 PM   Resp 8 10/9/2020  7:27 PM   SpO2 94 % 10/9/2020  7:27 PM   Vitals shown include unvalidated device data.

## 2020-10-10 VITALS
BODY MASS INDEX: 26.58 KG/M2 | HEART RATE: 69 BPM | SYSTOLIC BLOOD PRESSURE: 132 MMHG | WEIGHT: 150 LBS | HEIGHT: 63 IN | OXYGEN SATURATION: 99 % | RESPIRATION RATE: 16 BRPM | DIASTOLIC BLOOD PRESSURE: 74 MMHG | TEMPERATURE: 97.7 F

## 2020-10-10 PROCEDURE — 94760 N-INVAS EAR/PLS OXIMETRY 1: CPT

## 2020-10-10 PROCEDURE — 74011250637 HC RX REV CODE- 250/637: Performed by: ORTHOPAEDIC SURGERY

## 2020-10-10 PROCEDURE — 99218 HC RM OBSERVATION: CPT

## 2020-10-10 PROCEDURE — 74011000250 HC RX REV CODE- 250: Performed by: ORTHOPAEDIC SURGERY

## 2020-10-10 PROCEDURE — 94761 N-INVAS EAR/PLS OXIMETRY MLT: CPT

## 2020-10-10 PROCEDURE — 74011250636 HC RX REV CODE- 250/636: Performed by: ORTHOPAEDIC SURGERY

## 2020-10-10 PROCEDURE — 77010033678 HC OXYGEN DAILY

## 2020-10-10 RX ADMIN — FAMOTIDINE 20 MG: 20 TABLET, FILM COATED ORAL at 09:04

## 2020-10-10 RX ADMIN — ACETAMINOPHEN 1000 MG: 500 TABLET ORAL at 00:33

## 2020-10-10 RX ADMIN — FLUOXETINE 20 MG: 20 CAPSULE ORAL at 09:04

## 2020-10-10 RX ADMIN — HYDROXYZINE HYDROCHLORIDE 10 MG: 10 TABLET, FILM COATED ORAL at 07:31

## 2020-10-10 RX ADMIN — OXYCODONE 10 MG: 5 TABLET ORAL at 04:34

## 2020-10-10 RX ADMIN — SODIUM CHLORIDE 125 ML/HR: 900 INJECTION, SOLUTION INTRAVENOUS at 03:38

## 2020-10-10 RX ADMIN — POLYETHYLENE GLYCOL 3350 17 G: 17 POWDER, FOR SOLUTION ORAL at 09:05

## 2020-10-10 RX ADMIN — OXYCODONE 10 MG: 5 TABLET ORAL at 10:02

## 2020-10-10 RX ADMIN — OXYCODONE 10 MG: 5 TABLET ORAL at 00:46

## 2020-10-10 RX ADMIN — WATER 2 G: 1 INJECTION INTRAMUSCULAR; INTRAVENOUS; SUBCUTANEOUS at 00:33

## 2020-10-10 RX ADMIN — DOCUSATE SODIUM 50MG AND SENNOSIDES 8.6MG 1 TABLET: 8.6; 5 TABLET, FILM COATED ORAL at 09:04

## 2020-10-10 RX ADMIN — Medication 10 ML: at 06:15

## 2020-10-10 RX ADMIN — OXYCODONE 10 MG: 5 TABLET ORAL at 07:31

## 2020-10-10 RX ADMIN — WATER 2 G: 1 INJECTION INTRAMUSCULAR; INTRAVENOUS; SUBCUTANEOUS at 09:04

## 2020-10-10 RX ADMIN — ACETAMINOPHEN 1000 MG: 500 TABLET ORAL at 06:14

## 2020-10-10 NOTE — OP NOTES
[facility]  OPERATIVE REPORT    Name:  William Sharp  MR#:  [id number]  :  1990  ACCOUNT #:  [de-identified]  DATE OF SERVICE:  10/09/2020    PREOPERATIVE DIAGNOSIS:  Closed left both bone forearm fracture with displacement and comminution. POSTOPERATIVE DIAGNOSIS:  Closed left both bone forearm fracture with displacement and comminution. PROCEDURE PERFORMED:  Open reduction and internal fixation of left radius and ulnar shaft fractures. SURGEON:  Surekha Meza DO    ASSISTANT:  [facility] staff. ANESTHESIA:  General.    COMPLICATIONS:  None. SPECIMENS REMOVED:  None. IMPLANTS:  synthes lcp plates. ESTIMATED BLOOD LOSS:  Minimal.    DISPOSITION:  Stable to PACU. INDICATIONS FOR PROCEDURE:  The patient is a 26-year-old female who presented to the hospital today after a ground level fall. She was found to have evidence of a both bone forearm fracture of the left arm. Orthopedics was consulted for management. We discussed treatment options. Risks and benefits of ORIF of left forearm fracture were explained. Risks of infection, blood loss, neurovascular injury, anesthesia risk, and risks secondary to the patient's comorbidities were described. Specific risk to PIN nerve and superficial radial nerve were noted. Once she was cleared medically, she was taken to the OR same day as arrival.  We saw her in the preoperative holding area and marked her operative extremity. Neurovascular exam was difficult, but she seemed to be neurovascularly intact with mild paresthesias in the hand. PROCEDURE:  The patient was taken to the OR and transferred to the operating room table. Hand table was used. Sterile prepping and draping was performed after she was given sedation and intubated by Anesthesia. After sterile prepping and draping, a time-out was called. The patient was identified by name, date of birth, operative site, and operative procedure.   After all were in agreement that the left forearm was the operative extremity, an incision was made through an anterior approach to the forearm after tourniquet was inflated to 250 mmHg. Compartments were soft and compressible, but she did have some obvious muscle swelling at the fracture site. Careful dissection down to avoid neurovascular structures was performed as we identified the radial artery and superficial radial nerve. We worked our way down to the fracture site and there was obvious puncture of the muscle tissue from the fracture site. Bayonet apposition of the fracture was noted and a small area of comminution was noted at the radius. We were able to achieve an anatomical alignment of the radial shaft as we used 2 lobster claw clamps to reduce our fracture. We used a Synthes 6-hole 3.5-mm LCP plate to fix our fracture. We held this into place against the bone as we predrilled and placed through this 3.5-mm cortical screws. We got excellent purchase into bone and used x-ray to confirm appropriate alignment of our plate and the fracture. We then drilled the remaining 4 holes with 3.5-mm cortical screws as she had excellent bone quality. We continued our fixation moving towards the ulnar. An incision was made inline with the ulnar shaft in the area of the fracture site. Careful dissection down to avoid neurovascular structures was performed and again some muscle tissue was involved in the fracture site. We performed the reduction of the fracture to gain anatomic overall alignment and I used a combination of 3.5-mm cortical screws into the 3.5-mm LCP plate 6-hole after we reduced our fracture. This gave us nice compression at the fracture site and anatomic alignment. We took the forearm through range of motion and no impingement of the plate was noted. Final pictures using the small C-arm were taken of our fracture alignment and fixation.   Thorough irrigation of the wound was performed prior to closure using 2-0 Vicryl suture and a running 4-0 Monocryl stitch. Care was taken as we stitched the volar segment due to a tatoo which we discussed that we would have to make an incision through the tatoo at the time of surgery. Sterile dressings were applied and volar resting splint was applied. She will be transferred to PACU in stable condition and monitored closely. We will plan to discharge her home when she is comfortable. I did inject 30 mL of ropivacaine at her incisions, which will provide some analgesia. If she is comfortable, she may be go home. If not, we will keep her overnight for observation. I instructed the importance of ice and elevation. She was given specific instructions regarding dressing changes, activity restrictions and followup information. I will see her in the office in the next 10 to 14 days for wound check.       Janak Sol DO      [id number]  D:  10/09/2020 18:54  T:  10/10/2020 3:48  JOB #:  [id number]

## 2020-10-10 NOTE — PROGRESS NOTES
Pt given education regarding discharge instructions, prescriptions. Opportunities for questions given. PIV taken out with cath tip intact. Pt awaiting husbands arrival. Once he arrives pt will be discharged with personal belongings.

## 2020-10-10 NOTE — PROGRESS NOTES
POD 1 Day Post-Op    Procedure:  Procedure(s):  OPEN REDUCTION INTERNAL FIXATION LEFT FOREARM    Subjective:     Patient doing well, complaining of appropriate post-op pain. Kept overnight for pain control. She notes difficulty sleeping because of pain. Objective:     Blood pressure 132/74, pulse 69, temperature 97.7 °F (36.5 °C), resp. rate 16, height 5' 3\" (1.6 m), weight 68 kg (150 lb), SpO2 99 %, unknown if currently breastfeeding. Temp (24hrs), Av.3 °F (36.8 °C), Min:97.6 °F (36.4 °C), Max:100 °F (37.8 °C)      Physical Exam:  Examination of the left forearm reveals that the incision is clean, dry and intact. Sensation is intact to light touch.  mild swelling. No calf pain.   NVSI    Labs:   Lab Results   Component Value Date/Time    HGB 13.9 10/09/2020 02:00 PM         Assessment:     Principal Problem:    Left forearm fracture (10/9/2020)    Active Problems:    Radius/ulna fracture, left, closed, initial encounter (10/9/2020)        Procedure(s):  OPEN REDUCTION INTERNAL FIXATION LEFT FOREARM    Plan/Recommendations/Medical Decision Making:     - pain control - oxy  - ice   - dvt prophylaxis - scds  - d/c planning - home today        Rao Morales DO

## 2020-10-10 NOTE — PERIOP NOTES
TRANSFER - OUT REPORT:    Verbal report given to Khloe SIDHU(name) on Arya Negron  being transferred to [facility](unit) for routine progression of care       Report consisted of patients Situation, Background, Assessment and   Recommendations(SBAR). Information from the following report(s) OR Summary, Intake/Output and MAR was reviewed with the receiving nurse. Opportunity for questions and clarification was provided.       Patient transported with:   O2 @ 2 liters  Registered Nurse

## 2020-10-10 NOTE — PROGRESS NOTES
Problem: Falls - Risk of  Goal: *Absence of Falls  Description: Document Stephany Guidry Fall Risk and appropriate interventions in the flowsheet.   Outcome: Progressing Towards Goal  Note: Fall Risk Interventions:            Medication Interventions: Assess postural VS orthostatic hypotension, Patient to call before getting OOB, Teach patient to arise slowly         History of Falls Interventions: Door open when patient unattended, Room close to nurse's station         Problem: Patient Education: Go to Patient Education Activity  Goal: Patient/Family Education  Outcome: Progressing Towards Goal

## 2020-10-10 NOTE — PROGRESS NOTES
KAREN  Discharge   Follow-Up Appointments     CM acknowledges discharge, family to provide transportation. No further CM needs identified.  CM notified pt's nurse of d/c.    Martha Trivedi, MSN, RN  Care Manager

## 2020-10-10 NOTE — PROGRESS NOTES
Problem: Falls - Risk of  Goal: *Absence of Falls  Description: Document Win Mcmanus Fall Risk and appropriate interventions in the flowsheet.   Outcome: Resolved/Met     Problem: Patient Education: Go to Patient Education Activity  Goal: Patient/Family Education  Outcome: Resolved/Met

## 2020-10-10 NOTE — PROGRESS NOTES
Bedside and Verbal shift change report given to Diane John (oncoming nurse) by Student Nurse Sadie Perry (offgoing nurse). Report included the following information SBAR, Kardex, Intake/Output, MAR, Recent Results and Med Rec Status.

## 2020-10-12 NOTE — ED PROVIDER NOTES
EMERGENCY DEPARTMENT HISTORY AND PHYSICAL EXAM      Date: 10/9/2020  Patient Name: Damian Lemon    History of Presenting Illness     Chief Complaint   Patient presents with    Arm Injury     left arm severe deformity she fell in laundry room       History Provided By: Patient    HPI: Damian Lemon, 27 y.o. female with a past medical history significant for problems as stated below presents to the ED with cc of severe left arm deformity and pain after she tripped and fell in the laundry room. She reports tripping with an outstretched hand. She suffered only an arm injury with no head injury. She reports severe deformity and severe pain. She arrived by POV and is in extreme pain. She is unable to give an accurate history due to the severity of her pain. No other associated symptoms. No other exacerbating ambulating factors. There are no other complaints, changes, or physical findings at this time. PCP: None    No current facility-administered medications on file prior to encounter. Current Outpatient Medications on File Prior to Encounter   Medication Sig Dispense Refill    FLUoxetine (PROZAC) 20 mg capsule Take 1 Cap by mouth daily. Indications: Generalized Anxiety Disorder, major depressive disorder 15 Cap 1       Past History     Past Medical History:  Past Medical History:   Diagnosis Date    Abnormal Pap smear     Anxiety     Routine gynecological examination     [facility]       Past Surgical History:  Past Surgical History:   Procedure Laterality Date    HX GYN      3 c/s    HX TONSILLECTOMY         Family History:  Family History   Problem Relation Age of Onset    Hypertension Father        Social History:  Social History     Tobacco Use    Smoking status: Current Every Day Smoker     Packs/day: 0.50     Years: 6.00     Pack years: 3.00     Types: Cigarettes    Smokeless tobacco: Never Used   Substance Use Topics    Alcohol use:  Yes     Alcohol/week: 6.0 standard drinks     Types: 6 Cans of beer per week     Comment: drinks daily    Drug use: Yes     Types: Marijuana     Comment: last used this morning       Allergies:  No Known Allergies      Review of Systems   Review of Systems   Unable to perform ROS: Acuity of condition       Physical Exam   Physical Exam  Vitals signs and nursing note reviewed. Constitutional:       General: She is in acute distress. Appearance: She is well-developed. Comments: Patient understood severe amount of pain in the screaming and crying in her room. HENT:      Head: Normocephalic and atraumatic. Mouth/Throat:      Pharynx: No oropharyngeal exudate. Eyes:      Conjunctiva/sclera: Conjunctivae normal.      Pupils: Pupils are equal, round, and reactive to light. Neck:      Musculoskeletal: Normal range of motion. Cardiovascular:      Rate and Rhythm: Normal rate and regular rhythm. Heart sounds: No murmur. Pulmonary:      Effort: Pulmonary effort is normal. No respiratory distress. Breath sounds: Normal breath sounds. No wheezing. Abdominal:      General: Bowel sounds are normal. There is no distension. Palpations: Abdomen is soft. Tenderness: There is no abdominal tenderness. Musculoskeletal:      Right shoulder: She exhibits decreased range of motion, tenderness, bony tenderness, deformity and pain. Arms:       Comments: Left forearm: Patient with obvious deformity to the proximal third of the radius and ulna. She does have intact radial and ulnar pulses. There is no signs of open fracture. She has intact sensation throughout the wrist and hand. She has good distal cap refill. Skin:     General: Skin is warm and dry. Findings: No rash. Neurological:      Mental Status: She is alert and oriented to person, place, and time.       Coordination: Coordination normal.   Psychiatric:         Behavior: Behavior normal.         Diagnostic Study Results     Labs -   No results found for this or any previous visit (from the past 24 hour(s)). Radiologic Studies -   XR CHEST PORT   Final Result   IMPRESSION: No acute cardiopulmonary disease. XR FOREARM LT AP/LAT   Final Result   IMPRESSION: Radial and ulnar shaft fractures. CT Results  (Last 48 hours)    None        CXR Results  (Last 48 hours)    None            Medical Decision Making   I am the first provider for this patient. I reviewed the vital signs, available nursing notes, past medical history, past surgical history, family history and social history. Vital Signs-Reviewed the patient's vital signs. No data found. Vitals:    10/10/20 0327 10/10/20 0437 10/10/20 0732 10/10/20 0743   BP: 100/60 117/73 129/89 132/74   Pulse: 65  65 69   Resp: 16  16 16   Temp: 98 °F (36.7 °C)  98.3 °F (36.8 °C) 97.7 °F (36.5 °C)   SpO2: 97%  99% 99%   Weight:       Height:           Records Reviewed: Nursing records and medical records reviewed    MDM:      Provider Notes (Medical Decision Making):   Patient is a 19-year-old female presenting with a gross deformity of the left radius and ulna. There is a closed severely displaced and retracted fractures of both the radius and ulna. Patient is neurovascular intact. Patient is seen by the orthopedic PA, Susana Jhaveri. He will admit the patient to the service of the orthopedic surgeon for surgical fixation today. ED Course:   Initial assessment performed. The patients presenting problems have been discussed, and they are in agreement with the care plan formulated and outlined with them. I have encouraged them to ask questions as they arise throughout their visit.          Medications Administered     0.9% sodium chloride infusion     Admin Date  10/09/2020 Action  New Bag Dose  125 mL/hr Rate  125 mL/hr Route  IntraVENous Administered By  John Paul Paige RN           Admin Date  10/10/2020 Action  New Bag Dose  125 mL/hr Rate  125 mL/hr Route  IntraVENous Administered By  Cookie Matthew Papito B          acetaminophen (TYLENOL) tablet 1,000 mg     Admin Date  10/10/2020 Action  Given Dose  1000 mg Route  Oral Administered By  Chuy Bares Date  10/10/2020 Action  Given Dose  1000 mg Route  Oral Administered By  Kerwin PHOENIX          alcohol 62% (NOZIN) nasal  3 Ampule     Admin Date  10/09/2020 Action  Given Dose  3 Ampule Route  Topical Administered By  Jovon Anguiano RN          ceFAZolin (ANCEF) 2 g in sterile water (preservative free) 20 mL IV syringe     Admin Date  10/10/2020 Action  Given Dose  2 g Rate  200 mL/hr Route  IntraVENous Administered By  Chuy Bares Date  10/10/2020 Action  Given Dose  2 g Rate  200 mL/hr Route  IntraVENous Administered By  Meche Conley RN          famotidine (PEPCID) tablet 20 mg     Admin Date  10/09/2020 Action  Given Dose  20 mg Route  Oral Administered By  Chuy Bares Date  10/10/2020 Action  Given Dose  20 mg Route  Oral Administered By  Meche Conley RN          fentaNYL citrate (PF) injection 25 mcg     Admin Date  10/09/2020 Action  Given Dose  25 mcg Route  IntraVENous Administered By  Srini Ching RN           Admin Date  10/09/2020 Action  Given Dose  25 mcg Route  IntraVENous Administered By  Srini Ching RN           Admin Date  10/09/2020 Action  Given Dose  25 mcg Route  IntraVENous Administered By  Srini Ching RN           Admin Date  10/09/2020 Action  Given Dose  25 mcg Route  IntraVENous Administered By  Srini Ching RN           Admin Date  10/09/2020 Action  Given Dose  25 mcg Route  IntraVENous Administered By  Srini Ching RN           Admin Date  10/09/2020 Action  Given Dose  25 mcg Route  IntraVENous Administered By  Srini Ching RN           Admin Date  10/09/2020 Action  Given Dose  25 mcg Route  IntraVENous Administered By  Srini Ching RN          fentaNYL citrate (PF) injection 75 mcg     Admin Date  10/09/2020 Action  Given Dose  75 mcg Route  IntraVENous Administered By  David Gee RN           Admin Date  10/09/2020 Action  Given Dose  75 mcg Route  IntraVENous Administered By  Tripp Park RN          FLUoxetine (PROzac) capsule 20 mg     Admin Date  10/10/2020 Action  Given Dose  20 mg Route  Oral Administered By  Alka Zuniga RN          HYDROmorphone (PF) (DILAUDID) injection 0.2-0.5 mg     Admin Date  10/09/2020 Action  Given Dose  0.5 mg Route  IntraVENous Administered By  Sonya Garrett, RN           Admin Date  10/09/2020 Action  Given Dose  0.5 mg Route  IntraVENous Administered By  Sonya Garrett, RN           Admin Date  10/09/2020 Action  Given Dose  0.5 mg Route  IntraVENous Administered By  Sonya Garrett, RN           Admin Date  10/09/2020 Action  Given Dose  0.5 mg Route  IntraVENous Administered By  Sonya Garrett RN          HYDROmorphone (PF) (DILAUDID) injection 1 mg     Admin Date  10/09/2020 Action  Given Dose  1 mg Route  IntraVENous Administered By  Tripp Park RN          hydrOXYzine HCL (ATARAX) tablet 10 mg     Admin Date  10/10/2020 Action  Given Dose  10 mg Route  Oral Administered By  Valorie PHOENIX          ketamine (KETALAR) 10 mg/mL injection 25 mg     Admin Date  10/09/2020 Action  Given Dose  25 mg Route  IntraVENous Administered By  Tripp Park RN          lactated Ringers infusion     Admin Date  10/09/2020 Action  New Bag Dose  50 mL/hr Rate  50 mL/hr Route  IntraVENous Administered By  Etienne Vyas RN          nicotine (NICODERM CQ) 21 mg/24 hr patch 1 Patch     Admin Date  10/09/2020 Action  Apply Patch Dose  1 Patch Route  TransDERmal Administered By  [employer] Lizettemamadou Cazares          oxyCODONE IR (ROXICODONE) tablet 10 mg     Admin Date  10/09/2020 Action  Given Dose  10 mg Route  Oral Administered By  Sonya Garrett, THEA           Admin Date  10/10/2020 Action  Given Dose  10 mg Route  Oral Administered By  Bassam Frias Date  10/10/2020 Action  Given Dose  10 mg Route  Oral Administered By  Hattie Gins Date  10/10/2020 Action  Given Dose  10 mg Route  Oral Administered By  Hattie Gins Date  10/10/2020 Action  Given Dose  10 mg Route  Oral Administered By  Geronimo Bravo RN          polyethylene glycol (MIRALAX) packet 17 g     Admin Date  10/10/2020 Action  Given Dose  17 g Route  Oral Administered By  Balbina Roth RN          prochlorperazine (COMPAZINE) with saline injection 5 mg     Admin Date  10/09/2020 Action  Given Dose  5 mg Route  IntraVENous Administered By  Wesley Mohr RN          senna-docusate (PERICOLACE) 8.6-50 mg per tablet 1 Tab     Admin Date  10/09/2020 Action  Given Dose  1 Tab Route  Oral Administered By  Hattie Brantley Date  10/10/2020 Action  Given Dose  1 Tab Route  Oral Administered By  Balbina Roth RN          sodium chloride (NS) flush 5-40 mL     Admin Date  10/09/2020 Action  Given Dose  10 mL Route  IntraVENous Administered By  Wesley Mohr RN           Admin Date  10/09/2020 Action  Given Dose  10 mL Route  IntraVENous Administered By  Hattie Gins Date  10/10/2020 Action  Given Dose  10 mL Route  IntraVENous Administered By  Lorelei Sol                    Critical Care:  None      Disposition:  Admit Note:  4:16 PM  Pt is being admitted by orthopedic team. The results of their tests and reason(s) for their admission have been discussed with pt and/or available family. They convey agreement and understanding for the need to be admitted and for admission diagnosis. DISCHARGE PLAN:  1. Discharge Medication List as of 10/10/2020  9:24 AM        2. Follow-up Information     Follow up With Specialties Details Why Contact Info    None    None ([phone]) Patient stated that they have no PCP          3. Return to ED if worse     Diagnosis     Clinical Impression:   1.  Closed fracture of right radius and ulna, initial encounter Attestations:    Candi Rm MD    Please note that this dictation was completed with [employer], the computer voice recognition software. Quite often unanticipated grammatical, syntax, homophones, and other interpretive errors are inadvertently transcribed by the computer software. Please disregard these errors. Please excuse any errors that have escaped final proofreading. Thank you.

## 2020-11-16 ENCOUNTER — HOSPITAL ENCOUNTER (EMERGENCY)
Age: 30
Discharge: HOME | End: 2020-11-16
Attending: EMERGENCY MEDICINE
Payer: MEDICAID

## 2020-11-16 ENCOUNTER — APPOINTMENT (OUTPATIENT)
Dept: GENERAL RADIOLOGY | Age: 30
End: 2020-11-16
Attending: PHYSICIAN ASSISTANT
Payer: MEDICAID

## 2020-11-16 VITALS
RESPIRATION RATE: 14 BRPM | HEIGHT: 69 IN | OXYGEN SATURATION: 100 % | TEMPERATURE: 98 F | HEART RATE: 60 BPM | WEIGHT: 151.9 LBS | BODY MASS INDEX: 22.5 KG/M2 | DIASTOLIC BLOOD PRESSURE: 75 MMHG | SYSTOLIC BLOOD PRESSURE: 131 MMHG

## 2020-11-16 DIAGNOSIS — T81.41XA SUPERFICIAL INCISIONAL INFECTION OF SURGICAL SITE: Primary | ICD-10-CM

## 2020-11-16 PROCEDURE — 99283 EMERGENCY DEPT VISIT LOW MDM: CPT

## 2020-11-16 PROCEDURE — 74011000250 HC RX REV CODE- 250: Performed by: PHYSICIAN ASSISTANT

## 2020-11-16 PROCEDURE — 74011250636 HC RX REV CODE- 250/636: Performed by: PHYSICIAN ASSISTANT

## 2020-11-16 PROCEDURE — 73090 X-RAY EXAM OF FOREARM: CPT

## 2020-11-16 PROCEDURE — 96372 THER/PROPH/DIAG INJ SC/IM: CPT

## 2020-11-16 PROCEDURE — 74011250637 HC RX REV CODE- 250/637: Performed by: PHYSICIAN ASSISTANT

## 2020-11-16 RX ORDER — SULFAMETHOXAZOLE AND TRIMETHOPRIM 800; 160 MG/1; MG/1
1 TABLET ORAL 2 TIMES DAILY
Qty: 14 TAB | Refills: 0 | Status: SHIPPED | OUTPATIENT
Start: 2020-11-16 | End: 2020-11-23

## 2020-11-16 RX ORDER — HYDROCODONE BITARTRATE AND ACETAMINOPHEN 5; 325 MG/1; MG/1
1 TABLET ORAL
Qty: 8 TAB | Refills: 0 | Status: SHIPPED | OUTPATIENT
Start: 2020-11-16 | End: 2020-11-19

## 2020-11-16 RX ORDER — IBUPROFEN 400 MG/1
800 TABLET ORAL
Status: COMPLETED | OUTPATIENT
Start: 2020-11-16 | End: 2020-11-16

## 2020-11-16 RX ORDER — CEFAZOLIN SODIUM 1 G/3ML
1 INJECTION, POWDER, FOR SOLUTION INTRAMUSCULAR; INTRAVENOUS
Status: DISCONTINUED | OUTPATIENT
Start: 2020-11-16 | End: 2020-11-16

## 2020-11-16 RX ORDER — CEPHALEXIN 500 MG/1
500 CAPSULE ORAL 4 TIMES DAILY
Qty: 28 CAP | Refills: 0 | Status: SHIPPED | OUTPATIENT
Start: 2020-11-16 | End: 2020-11-23

## 2020-11-16 RX ADMIN — CEFAZOLIN SODIUM 1000 MG: 1 INJECTION, POWDER, FOR SOLUTION INTRAMUSCULAR; INTRAVENOUS at 16:05

## 2020-11-16 RX ADMIN — IBUPROFEN 800 MG: 400 TABLET, FILM COATED ORAL at 16:39

## 2020-11-16 NOTE — ED PROVIDER NOTES
EMERGENCY DEPARTMENT HISTORY AND PHYSICAL EXAM      Date: 11/16/2020  Patient Name: Damian Lemon    History of Presenting Illness     Chief Complaint   Patient presents with    Wound Check     Pt had surgery to L forearm in October, redness at site and would like it checked out. History Provided By: Patient    HPI: Damian Lemon, 27 y.o. female with no pertinent PMHx, presents to the ED with cc of redness to surgical site. The patient had ORIF of her left radius and ulna on October 7 after sustained a fall and fracture. Since then, she has had 2 small areas of erythema over the incision site. She reports they have gradually increased in size and become more painful. She has noted a small amount of purulent drainage. There are no modifying factors. She denies fever, chills. There are no other complaints, changes, or physical findings at this time. PCP: None    No current facility-administered medications on file prior to encounter. Current Outpatient Medications on File Prior to Encounter   Medication Sig Dispense Refill    FLUoxetine (PROZAC) 20 mg capsule Take 1 Cap by mouth daily. Indications: Generalized Anxiety Disorder, major depressive disorder 15 Cap 1       Past History     Past Medical History:  Past Medical History:   Diagnosis Date    Abnormal Pap smear     Anxiety     Routine gynecological examination     [facility]       Past Surgical History:  Past Surgical History:   Procedure Laterality Date    HX GYN      3 c/s    HX TONSILLECTOMY         Family History:  Family History   Problem Relation Age of Onset    Hypertension Father        Social History:  Social History     Tobacco Use    Smoking status: Current Every Day Smoker     Packs/day: 0.50     Years: 6.00     Pack years: 3.00     Types: Cigarettes    Smokeless tobacco: Never Used   Substance Use Topics    Alcohol use:  Yes     Alcohol/week: 6.0 standard drinks     Types: 6 Cans of beer per week Comment: drinks daily    Drug use: Yes     Types: Marijuana     Comment: last used this morning       Allergies:  No Known Allergies      Review of Systems   Review of Systems   Constitutional: Negative for chills and fever. HENT: Negative for ear pain and sore throat. Eyes: Negative for redness and visual disturbance. Respiratory: Negative for cough and shortness of breath. Cardiovascular: Negative for chest pain and palpitations. Gastrointestinal: Negative for abdominal pain, nausea and vomiting. Genitourinary: Negative for dysuria and hematuria. Musculoskeletal: Negative for back pain and gait problem. Skin: Negative for rash and wound. +erythema   Neurological: Negative for dizziness and headaches. Psychiatric/Behavioral: Negative for behavioral problems and confusion. All other systems reviewed and are negative. Physical Exam   Physical Exam  Constitutional:       Appearance: She is not toxic-appearing. Comments: Conversant female in no acute distress. HENT:      Head: Normocephalic and atraumatic. Mouth/Throat:      Mouth: Mucous membranes are moist.   Eyes:      Extraocular Movements: Extraocular movements intact. Pupils: Pupils are equal, round, and reactive to light. Neck:      Musculoskeletal: Normal range of motion and neck supple. Cardiovascular:      Rate and Rhythm: Normal rate and regular rhythm. Pulmonary:      Effort: Pulmonary effort is normal. No respiratory distress. Musculoskeletal: Normal range of motion. General: No deformity. Skin:     General: Skin is warm and dry. Comments: There is a surgical incision site over the left volar wrist. There are 2 areas of erythema, mild swelling, and scabbing. These areas are tender to palpation. No fluctuance. See picture below. Neurological:      General: No focal deficit present. Mental Status: She is alert and oriented to person, place, and time.    Psychiatric: Behavior: Behavior normal.                 Diagnostic Study Results     Labs -   No results found for this or any previous visit (from the past 12 hour(s)). Radiologic Studies -   XR FOREARM LT AP/LAT   Final Result   IMPRESSION: Plated mid shaft fractures of the left radius and ulna. CT Results  (Last 48 hours)    None        CXR Results  (Last 48 hours)    None            Medical Decision Making   I am the first provider for this patient. I reviewed the vital signs, available nursing notes, past medical history, past surgical history, family history and social history. Vital Signs-Reviewed the patient's vital signs. Patient Vitals for the past 12 hrs:   Temp Pulse Resp BP SpO2   11/16/20 1249 98 °F (36.7 °C) 60 14 131/75 100 %         Records Reviewed: Nursing Notes and Old Medical Records      Provider Notes (Medical Decision Making):   DDx: cellulitis, hardware complication, keloid    Plan to obtain x-ray and consult orthopedics. ED Course:   Initial assessment performed. The patients presenting problems have been discussed, and they are in agreement with the care plan formulated and outlined with them. I have encouraged them to ask questions as they arise throughout their visit. CONSULT NOTE:   Gwinda Mohs, PA-C spoke with Estefani Valdez NP,   Specialty: orthopedic surgery  Discussed pt's hx, disposition, and available diagnostic and imaging results. Reviewed care plans. Consultant recommends giving a dose of Ancef in the ED. She then recommends discharging on Keflex and Bactrim for MRSA coverage. Patient will need to follow up in the office in 2 days. Recommends patient return for worsening symptoms, fevers, chills. Disposition:  4:20 PM  The patient has been re-evaluated and is ready for discharge. Reviewed available results with patient. Counseled patient on diagnosis and care plan. Patient has expressed understanding, and all questions have been answered.  Patient agrees with plan and agrees to follow up as recommended, or to return to the ED if their symptoms worsen. Discharge instructions have been provided and explained to the patient, along with reasons to return to the ED. PLAN:  1. Discharge Medication List as of 11/16/2020  4:20 PM      START taking these medications    Details   cephALEXin (Keflex) 500 mg capsule Take 1 Cap by mouth four (4) times daily for 7 days. , Normal, Disp-28 Cap,R-0      trimethoprim-sulfamethoxazole (Bactrim DS) 160-800 mg per tablet Take 1 Tab by mouth two (2) times a day for 7 days. , Normal, Disp-14 Tab,R-0      HYDROcodone-acetaminophen (Norco) 5-325 mg per tablet Take 1 Tab by mouth every four (4) hours as needed for Pain for up to 3 days. Max Daily Amount: 6 Tabs., Normal, Disp-8 Tab,R-0         CONTINUE these medications which have NOT CHANGED    Details   FLUoxetine (PROZAC) 20 mg capsule Take 1 Cap by mouth daily. Indications: Generalized Anxiety Disorder, major depressive disorder, Print, Disp-15 Cap, R-1           2. Follow-up Information     Follow up With Specialties Details Why [address] [facility],  Orthopedic Surgery Call  to schedule a follow up appointment on Wednesday or Thursday with one of Dr. Gu Button colleagues for a recheck [address] [place] [address] [address]  [phone]      [facility] [place] EM[facility] DE[facility] Emergency Medicine Go to  if you develop fever, chills, worsening symptoms [address] [place], [place] [id number] [facility]  [phone]        Return to ED if worse     Diagnosis     Clinical Impression:   1. Superficial incisional infection of surgical site            Marisol Blandon.  VIKI Win

## 2020-11-16 NOTE — ED NOTES
Khloe SIDHU reviewed discharge instructions with the patient. The patient verbalized understanding. All questions and concerns were addressed. The patient declined a wheelchair and is discharged ambulatory in the care of family members with instructions and prescriptions in hand. Pt is alert and oriented x 4. Respirations are clear and unlabored.

## 2020-11-20 ENCOUNTER — HOSPITAL ENCOUNTER (EMERGENCY)
Age: 30
Discharge: HOME | End: 2020-11-20
Attending: EMERGENCY MEDICINE
Payer: MEDICAID

## 2020-11-20 VITALS
WEIGHT: 149.47 LBS | HEIGHT: 69 IN | BODY MASS INDEX: 22.14 KG/M2 | HEART RATE: 97 BPM | RESPIRATION RATE: 18 BRPM | SYSTOLIC BLOOD PRESSURE: 127 MMHG | DIASTOLIC BLOOD PRESSURE: 73 MMHG | OXYGEN SATURATION: 100 % | TEMPERATURE: 98.4 F

## 2020-11-20 DIAGNOSIS — Z20.822 SUSPECTED COVID-19 VIRUS INFECTION: ICD-10-CM

## 2020-11-20 DIAGNOSIS — R11.2 NON-INTRACTABLE VOMITING WITH NAUSEA, UNSPECIFIED VOMITING TYPE: ICD-10-CM

## 2020-11-20 DIAGNOSIS — R50.9 FEVER, UNSPECIFIED FEVER CAUSE: Primary | ICD-10-CM

## 2020-11-20 LAB
ALBUMIN SERPL-MCNC: 3.8 G/DL (ref 3.5–5)
ALBUMIN/GLOB SERPL: 1.2 {RATIO} (ref 1.1–2.2)
ALP SERPL-CCNC: 114 U/L (ref 45–117)
ALT SERPL-CCNC: 26 U/L (ref 12–78)
ANION GAP SERPL CALC-SCNC: 7 MMOL/L (ref 5–15)
AST SERPL-CCNC: 24 U/L (ref 15–37)
BASOPHILS # BLD: 0 K/UL (ref 0–0.1)
BASOPHILS NFR BLD: 1 % (ref 0–1)
BILIRUB SERPL-MCNC: 0.3 MG/DL (ref 0.2–1)
BUN SERPL-MCNC: 10 MG/DL (ref 6–20)
BUN/CREAT SERPL: 10 (ref 12–20)
CALCIUM SERPL-MCNC: 9 MG/DL (ref 8.5–10.1)
CHLORIDE SERPL-SCNC: 107 MMOL/L (ref 97–108)
CO2 SERPL-SCNC: 22 MMOL/L (ref 21–32)
CREAT SERPL-MCNC: 1.05 MG/DL (ref 0.55–1.02)
DIFFERENTIAL METHOD BLD: ABNORMAL
EOSINOPHIL # BLD: 0.2 K/UL (ref 0–0.4)
EOSINOPHIL NFR BLD: 4 % (ref 0–7)
ERYTHROCYTE [DISTWIDTH] IN BLOOD BY AUTOMATED COUNT: 13.5 % (ref 11.5–14.5)
FLUAV AG NPH QL IA: NEGATIVE
FLUBV AG NOSE QL IA: NEGATIVE
GLOBULIN SER CALC-MCNC: 3.3 G/DL (ref 2–4)
GLUCOSE SERPL-MCNC: 106 MG/DL (ref 65–100)
HCT VFR BLD AUTO: 40.1 % (ref 35–47)
HGB BLD-MCNC: 13.4 G/DL (ref 11.5–16)
IMM GRANULOCYTES # BLD AUTO: 0 K/UL (ref 0–0.04)
IMM GRANULOCYTES NFR BLD AUTO: 1 % (ref 0–0.5)
LIPASE SERPL-CCNC: 48 U/L (ref 73–393)
LYMPHOCYTES # BLD: 0.6 K/UL (ref 0.8–3.5)
LYMPHOCYTES NFR BLD: 13 % (ref 12–49)
MCH RBC QN AUTO: 30.7 PG (ref 26–34)
MCHC RBC AUTO-ENTMCNC: 33.4 G/DL (ref 30–36.5)
MCV RBC AUTO: 91.8 FL (ref 80–99)
MONOCYTES # BLD: 0.4 K/UL (ref 0–1)
MONOCYTES NFR BLD: 9 % (ref 5–13)
NEUTS SEG # BLD: 3.1 K/UL (ref 1.8–8)
NEUTS SEG NFR BLD: 72 % (ref 32–75)
NRBC # BLD: 0 K/UL (ref 0–0.01)
NRBC BLD-RTO: 0 PER 100 WBC
PLATELET # BLD AUTO: 166 K/UL (ref 150–400)
PMV BLD AUTO: 10.2 FL (ref 8.9–12.9)
POTASSIUM SERPL-SCNC: 3.7 MMOL/L (ref 3.5–5.1)
PROT SERPL-MCNC: 7.1 G/DL (ref 6.4–8.2)
RBC # BLD AUTO: 4.37 M/UL (ref 3.8–5.2)
RBC MORPH BLD: ABNORMAL
SODIUM SERPL-SCNC: 136 MMOL/L (ref 136–145)
WBC # BLD AUTO: 4.3 K/UL (ref 3.6–11)

## 2020-11-20 PROCEDURE — 83690 ASSAY OF LIPASE: CPT

## 2020-11-20 PROCEDURE — 80053 COMPREHEN METABOLIC PANEL: CPT

## 2020-11-20 PROCEDURE — 99283 EMERGENCY DEPT VISIT LOW MDM: CPT

## 2020-11-20 PROCEDURE — 36415 COLL VENOUS BLD VENIPUNCTURE: CPT

## 2020-11-20 PROCEDURE — 87635 SARS-COV-2 COVID-19 AMP PRB: CPT

## 2020-11-20 PROCEDURE — 96361 HYDRATE IV INFUSION ADD-ON: CPT

## 2020-11-20 PROCEDURE — 96375 TX/PRO/DX INJ NEW DRUG ADDON: CPT

## 2020-11-20 PROCEDURE — 87804 INFLUENZA ASSAY W/OPTIC: CPT

## 2020-11-20 PROCEDURE — 85025 COMPLETE CBC W/AUTO DIFF WBC: CPT

## 2020-11-20 PROCEDURE — 74011250636 HC RX REV CODE- 250/636: Performed by: EMERGENCY MEDICINE

## 2020-11-20 PROCEDURE — 96374 THER/PROPH/DIAG INJ IV PUSH: CPT

## 2020-11-20 RX ORDER — KETOROLAC TROMETHAMINE 30 MG/ML
30 INJECTION, SOLUTION INTRAMUSCULAR; INTRAVENOUS ONCE
Status: COMPLETED | OUTPATIENT
Start: 2020-11-20 | End: 2020-11-20

## 2020-11-20 RX ORDER — ONDANSETRON 2 MG/ML
4 INJECTION INTRAMUSCULAR; INTRAVENOUS ONCE
Status: COMPLETED | OUTPATIENT
Start: 2020-11-20 | End: 2020-11-20

## 2020-11-20 RX ORDER — ONDANSETRON 4 MG/1
4 TABLET, ORALLY DISINTEGRATING ORAL
Qty: 20 TAB | Refills: 0 | Status: SHIPPED | OUTPATIENT
Start: 2020-11-20 | End: 2021-08-28

## 2020-11-20 RX ORDER — ONDANSETRON 4 MG/1
4 TABLET, ORALLY DISINTEGRATING ORAL
Qty: 20 TAB | Refills: 0 | Status: SHIPPED | OUTPATIENT
Start: 2020-11-20 | End: 2020-11-20 | Stop reason: SDUPTHER

## 2020-11-20 RX ADMIN — KETOROLAC TROMETHAMINE 30 MG: 30 INJECTION, SOLUTION INTRAMUSCULAR at 08:16

## 2020-11-20 RX ADMIN — ONDANSETRON 4 MG: 2 INJECTION INTRAMUSCULAR; INTRAVENOUS at 08:43

## 2020-11-20 RX ADMIN — SODIUM CHLORIDE 1000 ML: 900 INJECTION, SOLUTION INTRAVENOUS at 08:15

## 2020-11-20 NOTE — ED PROVIDER NOTES
EMERGENCY DEPARTMENT HISTORY AND PHYSICAL EXAM      Date: 2020  Patient Name: Scott Aguayo    History of Presenting Illness     Chief Complaint   Patient presents with    Fever     Pt reports fever, headache, muscle aches, cold sweats, nausea x yesterday. History Provided By: Patient    HPI: Scott Aguayo, 27 y.o. female presents to the ED with cc of fever and viral symptoms. 80-year-old female with no significant past medical history, not immunosuppressed, presents emergency department with a chief complaint of fever x2 days. Patient was seen in the ED approximately 1 week ago for a left forearm infection, she was started on Bactrim and Keflex which she has been compliant with until 2 days ago. Patient reports she initially developed nausea with vomiting. Denies abdominal pain. Denies diarrhea. Patient reports she then developed a fever, at home measured to 102. Patient reports she had night sweats. Endorses body wide myalgias. Mild holoacranial headache. No neck pain. Patient denies any travel, denies any sick contacts. No recent surgeries. She reports her arm is improving on the antibiotics. Denies cough or shortness of breath. Denies anosmia or dysgeusia. Denies rash. Denies IV drug abuse. Denies history of immunosuppression. There are no other complaints, changes, or physical findings at this time. PCP: None    No current facility-administered medications on file prior to encounter. Current Outpatient Medications on File Prior to Encounter   Medication Sig Dispense Refill    cephALEXin (Keflex) 500 mg capsule Take 1 Cap by mouth four (4) times daily for 7 days. 28 Cap 0    trimethoprim-sulfamethoxazole (Bactrim DS) 160-800 mg per tablet Take 1 Tab by mouth two (2) times a day for 7 days. 14 Tab 0    [] HYDROcodone-acetaminophen (Norco) 5-325 mg per tablet Take 1 Tab by mouth every four (4) hours as needed for Pain for up to 3 days.  Max Daily Amount: 6 Tabs. 8 Tab 0    FLUoxetine (PROZAC) 20 mg capsule Take 1 Cap by mouth daily. Indications: Generalized Anxiety Disorder, major depressive disorder 15 Cap 1       Past History     Past Medical History:  Past Medical History:   Diagnosis Date    Abnormal Pap smear     Anxiety     Routine gynecological examination     [facility]       Past Surgical History:  Past Surgical History:   Procedure Laterality Date    HX GYN      3 c/s    HX TONSILLECTOMY         Family History:  Family History   Problem Relation Age of Onset    Hypertension Father        Social History:  Social History     Tobacco Use    Smoking status: Current Every Day Smoker     Packs/day: 0.50     Years: 6.00     Pack years: 3.00     Types: Cigarettes    Smokeless tobacco: Never Used   Substance Use Topics    Alcohol use: Yes     Alcohol/week: 6.0 standard drinks     Types: 6 Cans of beer per week     Comment: drinks daily    Drug use: Yes     Types: Marijuana     Comment: last used this morning       Allergies:  No Known Allergies      Review of Systems   Review of Systems   Constitutional: Positive for fever. Negative for activity change and chills. HENT: Negative for facial swelling and voice change. Eyes: Negative for redness. Respiratory: Negative for cough, shortness of breath and wheezing. Cardiovascular: Negative for chest pain and leg swelling. Gastrointestinal: Positive for nausea and vomiting. Negative for abdominal pain and diarrhea. Genitourinary: Negative for decreased urine volume and dysuria. Musculoskeletal: Positive for myalgias. Negative for arthralgias, gait problem, neck pain and neck stiffness. Skin: Positive for wound. Negative for pallor and rash. Neurological: Negative for tremors and facial asymmetry. Psychiatric/Behavioral: Negative for agitation. All other systems reviewed and are negative. Physical Exam   Physical Exam  Vitals signs and nursing note reviewed. Constitutional:       Comments: Nontoxic-appearing 66-year-old female, resting in bed, no distress   HENT:      Head: Normocephalic and atraumatic. Comments: Mask in place  Neck:      Musculoskeletal: Normal range of motion and neck supple. No neck rigidity or muscular tenderness. Cardiovascular:      Rate and Rhythm: Normal rate and regular rhythm. Heart sounds: No murmur. No friction rub. No gallop. Pulmonary:      Effort: Pulmonary effort is normal.      Breath sounds: Normal breath sounds. Abdominal:      Palpations: Abdomen is soft. Tenderness: There is no abdominal tenderness. Musculoskeletal: Normal range of motion. Skin:     General: Skin is warm. Capillary Refill: Capillary refill takes less than 2 seconds. Comments: There is a healed incision over the left forearm with no surrounding erythema. No tenderness to palpation. No fluctuance. Neurovascularly intact distally. Neurological:      General: No focal deficit present. Mental Status: She is alert. Psychiatric:         Mood and Affect: Mood normal.         Diagnostic Study Results     Labs -     Recent Results (from the past 12 hour(s))   CBC WITH AUTOMATED DIFF    Collection Time: 11/20/20  8:24 AM   Result Value Ref Range    WBC 4.3 3.6 - 11.0 K/uL    RBC 4.37 3.80 - 5.20 M/uL    HGB 13.4 11.5 - 16.0 g/dL    HCT 40.1 35.0 - 47.0 %    MCV 91.8 80.0 - 99.0 FL    MCH 30.7 26.0 - 34.0 PG    MCHC 33.4 30.0 - 36.5 g/dL    RDW 13.5 11.5 - 14.5 %    PLATELET 336 207 - 374 K/uL    MPV 10.2 8.9 - 12.9 FL    NRBC 0.0 0  WBC    ABSOLUTE NRBC 0.00 0.00 - 0.01 K/uL    NEUTROPHILS 72 32 - 75 %    LYMPHOCYTES 13 12 - 49 %    MONOCYTES 9 5 - 13 %    EOSINOPHILS 4 0 - 7 %    BASOPHILS 1 0 - 1 %    IMMATURE GRANULOCYTES 1 (H) 0.0 - 0.5 %    ABS. NEUTROPHILS 3.1 1.8 - 8.0 K/UL    ABS. LYMPHOCYTES 0.6 (L) 0.8 - 3.5 K/UL    ABS. MONOCYTES 0.4 0.0 - 1.0 K/UL    ABS. EOSINOPHILS 0.2 0.0 - 0.4 K/UL    ABS.  BASOPHILS 0.0 0.0 - 0.1 K/UL    ABS. IMM. GRANS. 0.0 0.00 - 0.04 K/UL    DF SMEAR SCANNED      RBC COMMENTS NORMOCYTIC, NORMOCHROMIC     METABOLIC PANEL, COMPREHENSIVE    Collection Time: 11/20/20  8:24 AM   Result Value Ref Range    Sodium 136 136 - 145 mmol/L    Potassium 3.7 3.5 - 5.1 mmol/L    Chloride 107 97 - 108 mmol/L    CO2 22 21 - 32 mmol/L    Anion gap 7 5 - 15 mmol/L    Glucose 106 (H) 65 - 100 mg/dL    BUN 10 6 - 20 MG/DL    Creatinine 1.05 (H) 0.55 - 1.02 MG/DL    BUN/Creatinine ratio 10 (L) 12 - 20      GFR est AA >60 >60 ml/min/1.73m2    GFR est non-AA >60 >60 ml/min/1.73m2    Calcium 9.0 8.5 - 10.1 MG/DL    Bilirubin, total 0.3 0.2 - 1.0 MG/DL    ALT (SGPT) 26 12 - 78 U/L    AST (SGOT) 24 15 - 37 U/L    Alk. phosphatase 114 45 - 117 U/L    Protein, total 7.1 6.4 - 8.2 g/dL    Albumin 3.8 3.5 - 5.0 g/dL    Globulin 3.3 2.0 - 4.0 g/dL    A-G Ratio 1.2 1.1 - 2.2     LIPASE    Collection Time: 11/20/20  8:24 AM   Result Value Ref Range    Lipase 48 (L) 73 - 393 U/L   INFLUENZA A+B VIRAL AGS    Collection Time: 11/20/20  8:24 AM   Result Value Ref Range    Influenza A Antigen Negative NEG      Influenza B Antigen Negative NEG     SARS-COV-2    Collection Time: 11/20/20  8:42 AM   Result Value Ref Range    Specimen source Nasopharyngeal      SARS-CoV-2 PENDING     SARS-CoV-2 PENDING     Specimen source Nasopharyngeal      COVID-19 rapid test PENDING     Specimen type NP Swab      Health status PENDING     COVID-19 PENDING        Radiologic Studies -   No orders to display     CT Results  (Last 48 hours)    None        CXR Results  (Last 48 hours)    None          Medical Decision Making   I am the first provider for this patient. I reviewed the vital signs, available nursing notes, past medical history, past surgical history, family history and social history. Vital Signs-Reviewed the patient's vital signs.   Patient Vitals for the past 12 hrs:   Temp Pulse Resp BP SpO2   11/20/20 0750 98.4 °F (36.9 °C) 97 18 127/73 100 %     Records Reviewed: Nursing Notes, Old Medical Records and Wound photos    Provider Notes (Medical Decision Making):     Previously healthy 58-year-old female presents emergency department with reported fever and viral symptoms. Vitals here are reassuring. Patient reports myalgias, headache and nausea and vomiting. Given patient's well appearance, certainly consider viral illness. Based on patient's presentation, doubt severe bacterial infection. She is not immune suppressed, no stigmata to suggest endocarditis. She has no nuchal rigidity, I have a very low suspicion for bacterial meningitis. Reviewed wound photos from previous visit, patient's incision site appears improved, doubt deep space abscess. No respiratory or urinary symptoms at this time. Her lung exam is benign. Abdomen is benign. Check basic screening labs. Toradol. Give bolus of fluid and Zofran. Likely discharge. ED Course:   Initial assessment performed. The patients presenting problems have been discussed, and they are in agreement with the care plan formulated and outlined with them. I have encouraged them to ask questions as they arise throughout their visit. ED Course as of Nov 20 1541 Fri Nov 20, 2020   6707 CBC without significant leukocytosis. CMP with mild elevation in renal function. Lipase is normal.  Influenza negative. Patient reassessed, will discharge with antiemetics and symptomatic care. Strict return precautions provided. [MB]   2832 Patient's abdominal exam on repeat is benign, she is resting comfortably no distress. [MB]      ED Course User Index  [MB] Marilyn Watson MD     Updates as above, discussed with patient return precautions. Symptomatic care. Covid results pending. Richard Saunders MD      Disposition:    Reassessments as above. Labs and ED course reviewed. Patient was discharged home and was provided strict return precautions.  Patient to follow-up with PCP or specialist per discharge instructions or return to ED for worsening symptoms. DISCHARGE PLAN:  1. Discharge Medication List as of 2020 10:02 AM      CONTINUE these medications which have CHANGED    Details   ondansetron (Zofran ODT) 4 mg disintegrating tablet 1 Tab by SubLINGual route every eight (8) hours as needed for Nausea or Vomiting., Print, Disp-20 Tab,R-0         CONTINUE these medications which have NOT CHANGED    Details   cephALEXin (Keflex) 500 mg capsule Take 1 Cap by mouth four (4) times daily for 7 days. , Normal, Disp-28 Cap,R-0      trimethoprim-sulfamethoxazole (Bactrim DS) 160-800 mg per tablet Take 1 Tab by mouth two (2) times a day for 7 days. , Normal, Disp-14 Tab,R-0      FLUoxetine (PROZAC) 20 mg capsule Take 1 Cap by mouth daily. Indications: Generalized Anxiety Disorder, major depressive disorder, Print, Disp-15 Cap, R-1         STOP taking these medications       HYDROcodone-acetaminophen (Norco) 5-325 mg per tablet Comments:   Reason for Stoppin.   Follow-up Information     Follow up With Specialties Details Why Contact Info    [facility] EMERGENCY DEPT Emergency Medicine  If symptoms worsen: continued fever, nausea or vomiting, shortness of breath, confusion, neck pain. [address] [place] [address] [place]  [phone]        3. Return to ED if worse     Diagnosis     Clinical Impression:   1. Fever, unspecified fever cause    2. Non-intractable vomiting with nausea, unspecified vomiting type    3. Suspected COVID-19 virus infection        Attestations:    Maude Angelucci, MD    Please note that this dictation was completed with [employer], the [employer] voice recognition software. Quite often unanticipated grammatical, syntax, homophones, and other interpretive errors are inadvertently transcribed by the computer software. Please disregard these errors. Please excuse any errors that have escaped final proofreading. Thank you.

## 2020-11-20 NOTE — DISCHARGE INSTRUCTIONS
You were seen in the ER for your symptoms. You can use the medications zofran for nausea as well as tylenol and motrin for your fever. You were checked for the coronavirus and will be called if this test is positive. Please isolate while awaiting results.

## 2020-11-20 NOTE — ED NOTES
Dr. Vale Padilla at bedside to provide discharge paperwork. Vital signs stable. Pt in no apparent distress at this time. Mental status at baseline. Ambulatory to waiting room with steady gate, discharge paperwork in hand. Patient and or family acknowledged and signed discharge instructions that were created/provided by the Physician. Signed discharge form with patient label placed in scan box.

## 2020-11-21 ENCOUNTER — PATIENT OUTREACH (OUTPATIENT)
Dept: CASE MANAGEMENT | Age: 30
End: 2020-11-21

## 2020-11-21 LAB
COVID-19, XGCOVT: NOT DETECTED
HEALTH STATUS, XMCV2T: NORMAL
SOURCE, COVRS: NORMAL
SPECIMEN SOURCE, FCOV2M: NORMAL
SPECIMEN TYPE, XMCV1T: NORMAL

## 2020-11-21 NOTE — PROGRESS NOTES
Patient contacted regarding COVID-19 suspected . [facility] ED 20 dx-fever, vomiting, suspected covid-19 infection (fever)    Discussed COVID-19 related testing which was pending at this time. Test results were pending. Patient informed of results, if available? Pending. Outreach made within 2 business days of discharge: Yes    Pt stated she is not feeling too good. She has a bad headahce. She is not taking the zofran due to no nausea. Pt declined a referral to establish a PCP. Care Transition Nurse/ Ambulatory Care Manager/ LPN Care Coordinator contacted the patient by telephone to perform post discharge assessment. Verified name and  with patient as identifiers. Provided introduction to self, and explanation of the CTN/ACM/LPN role, and reason for call due to risk factors for infection and/or exposure to COVID-19. Symptoms reviewed with patient who verbalized the following symptoms: cough, no new symptoms and no worsening symptoms. Due to no new or worsening symptoms encounter was not routed to provider for escalation. Discussed follow-up appointments. If no appointment was previously scheduled, appointment scheduling offered: yes-pt declined referral  [facility] follow up appointment(s): No future appointments. Non[facility] follow up appointment(s): none      Advance Care Planning:   Does patient have an Advance Directive: not on file    Patient has following risk factors of: no known risk factors. CTN/ACM/LPN reviewed discharge instructions, medical action plan and red flags such as increased shortness of breath, increasing fever and signs of decompensation with patient who verbalized understanding. Discussed exposure protocols and quarantine with CDC Guidelines What to do if you are sick with coronavirus disease .  Patient was given an opportunity for questions and concerns.  The patient agrees to contact the [facility] exposure line [phone], local health department n/a and PCP office for questions related to their healthcare. CTN/ACM provided contact information for future needs. Reviewed and educated patient on any new and changed medications related to discharge diagnosis. Patient/family/caregiver given information for [employer] and agrees to enroll yes  Patient's preferred e-mail:  [email or web address]  Patient's preferred phone number: [phone]  Based on Loop alert triggers, patient will be contacted by nurse care manager for worsening symptoms. Pt will be further monitored by COVID Loop Team based on severity of symptoms and risk factors.

## 2021-05-16 ENCOUNTER — HOSPITAL ENCOUNTER (EMERGENCY)
Age: 31
Discharge: HOME | End: 2021-05-16
Attending: STUDENT IN AN ORGANIZED HEALTH CARE EDUCATION/TRAINING PROGRAM
Payer: MEDICAID

## 2021-05-16 VITALS
BODY MASS INDEX: 21.42 KG/M2 | RESPIRATION RATE: 18 BRPM | HEART RATE: 66 BPM | WEIGHT: 144.62 LBS | HEIGHT: 69 IN | DIASTOLIC BLOOD PRESSURE: 92 MMHG | OXYGEN SATURATION: 100 % | SYSTOLIC BLOOD PRESSURE: 138 MMHG | TEMPERATURE: 97.6 F

## 2021-05-16 DIAGNOSIS — T43.222A SSRI OVERDOSE, INTENTIONAL SELF-HARM, INITIAL ENCOUNTER (HCC): Primary | ICD-10-CM

## 2021-05-16 DIAGNOSIS — F32.A DEPRESSION, UNSPECIFIED DEPRESSION TYPE: ICD-10-CM

## 2021-05-16 LAB
ALBUMIN SERPL-MCNC: 4 G/DL (ref 3.5–5)
ALBUMIN/GLOB SERPL: 1.2 {RATIO} (ref 1.1–2.2)
ALP SERPL-CCNC: 93 U/L (ref 45–117)
ALT SERPL-CCNC: 23 U/L (ref 12–78)
AMPHET UR QL SCN: NEGATIVE
ANION GAP SERPL CALC-SCNC: 5 MMOL/L (ref 5–15)
APAP SERPL-MCNC: <2 UG/ML (ref 10–30)
APPEARANCE UR: ABNORMAL
AST SERPL-CCNC: 16 U/L (ref 15–37)
BARBITURATES UR QL SCN: NEGATIVE
BASOPHILS # BLD: 0.1 K/UL (ref 0–0.1)
BASOPHILS NFR BLD: 1 % (ref 0–1)
BENZODIAZ UR QL: POSITIVE
BILIRUB SERPL-MCNC: 0.7 MG/DL (ref 0.2–1)
BILIRUB UR QL: NEGATIVE
BUN SERPL-MCNC: 12 MG/DL (ref 6–20)
BUN/CREAT SERPL: 14 (ref 12–20)
CALCIUM SERPL-MCNC: 9.2 MG/DL (ref 8.5–10.1)
CANNABINOIDS UR QL SCN: POSITIVE
CHLORIDE SERPL-SCNC: 106 MMOL/L (ref 97–108)
CO2 SERPL-SCNC: 26 MMOL/L (ref 21–32)
COCAINE UR QL SCN: NEGATIVE
COLOR UR: ABNORMAL
COVID-19 RAPID TEST, COVR: NOT DETECTED
CREAT SERPL-MCNC: 0.85 MG/DL (ref 0.55–1.02)
DIFFERENTIAL METHOD BLD: ABNORMAL
DRUG SCRN COMMENT,DRGCM: ABNORMAL
EOSINOPHIL # BLD: 0.2 K/UL (ref 0–0.4)
EOSINOPHIL NFR BLD: 1 % (ref 0–7)
ERYTHROCYTE [DISTWIDTH] IN BLOOD BY AUTOMATED COUNT: 13.2 % (ref 11.5–14.5)
ETHANOL SERPL-MCNC: <10 MG/DL
GLOBULIN SER CALC-MCNC: 3.3 G/DL (ref 2–4)
GLUCOSE SERPL-MCNC: 119 MG/DL (ref 65–100)
GLUCOSE UR STRIP.AUTO-MCNC: NEGATIVE MG/DL
HCT VFR BLD AUTO: 42.9 % (ref 35–47)
HGB BLD-MCNC: 14.8 G/DL (ref 11.5–16)
HGB UR QL STRIP: NEGATIVE
IMM GRANULOCYTES # BLD AUTO: 0.1 K/UL (ref 0–0.04)
IMM GRANULOCYTES NFR BLD AUTO: 1 % (ref 0–0.5)
KETONES UR QL STRIP.AUTO: 40 MG/DL
LEUKOCYTE ESTERASE UR QL STRIP.AUTO: NEGATIVE
LYMPHOCYTES # BLD: 2.3 K/UL (ref 0.8–3.5)
LYMPHOCYTES NFR BLD: 14 % (ref 12–49)
MCH RBC QN AUTO: 31.1 PG (ref 26–34)
MCHC RBC AUTO-ENTMCNC: 34.5 G/DL (ref 30–36.5)
MCV RBC AUTO: 90.1 FL (ref 80–99)
METHADONE UR QL: NEGATIVE
MONOCYTES # BLD: 0.7 K/UL (ref 0–1)
MONOCYTES NFR BLD: 4 % (ref 5–13)
NEUTS SEG # BLD: 13.8 K/UL (ref 1.8–8)
NEUTS SEG NFR BLD: 79 % (ref 32–75)
NITRITE UR QL STRIP.AUTO: NEGATIVE
NRBC # BLD: 0 K/UL (ref 0–0.01)
NRBC BLD-RTO: 0 PER 100 WBC
OPIATES UR QL: NEGATIVE
PCP UR QL: NEGATIVE
PH UR STRIP: 8 [PH] (ref 5–8)
PLATELET # BLD AUTO: 260 K/UL (ref 150–400)
PMV BLD AUTO: 10.1 FL (ref 8.9–12.9)
POTASSIUM SERPL-SCNC: 3.8 MMOL/L (ref 3.5–5.1)
PROT SERPL-MCNC: 7.3 G/DL (ref 6.4–8.2)
PROT UR STRIP-MCNC: NEGATIVE MG/DL
RBC # BLD AUTO: 4.76 M/UL (ref 3.8–5.2)
SALICYLATES SERPL-MCNC: <1.7 MG/DL (ref 2.8–20)
SARS-COV-2, COV2: NORMAL
SODIUM SERPL-SCNC: 137 MMOL/L (ref 136–145)
SOURCE, COVRS: NORMAL
SP GR UR REFRACTOMETRY: 1.02 (ref 1–1.03)
UROBILINOGEN UR QL STRIP.AUTO: 0.2 EU/DL (ref 0.2–1)
WBC # BLD AUTO: 17.2 K/UL (ref 3.6–11)

## 2021-05-16 PROCEDURE — 80053 COMPREHEN METABOLIC PANEL: CPT

## 2021-05-16 PROCEDURE — 93005 ELECTROCARDIOGRAM TRACING: CPT

## 2021-05-16 PROCEDURE — 96361 HYDRATE IV INFUSION ADD-ON: CPT

## 2021-05-16 PROCEDURE — 96375 TX/PRO/DX INJ NEW DRUG ADDON: CPT

## 2021-05-16 PROCEDURE — 74011250636 HC RX REV CODE- 250/636: Performed by: STUDENT IN AN ORGANIZED HEALTH CARE EDUCATION/TRAINING PROGRAM

## 2021-05-16 PROCEDURE — 81003 URINALYSIS AUTO W/O SCOPE: CPT

## 2021-05-16 PROCEDURE — 87635 SARS-COV-2 COVID-19 AMP PRB: CPT

## 2021-05-16 PROCEDURE — 87636 SARSCOV2 & INF A&B AMP PRB: CPT

## 2021-05-16 PROCEDURE — 96374 THER/PROPH/DIAG INJ IV PUSH: CPT

## 2021-05-16 PROCEDURE — 80307 DRUG TEST PRSMV CHEM ANLYZR: CPT

## 2021-05-16 PROCEDURE — 85025 COMPLETE CBC W/AUTO DIFF WBC: CPT

## 2021-05-16 PROCEDURE — 74011250636 HC RX REV CODE- 250/636: Performed by: EMERGENCY MEDICINE

## 2021-05-16 PROCEDURE — 99285 EMERGENCY DEPT VISIT HI MDM: CPT

## 2021-05-16 PROCEDURE — 82077 ASSAY SPEC XCP UR&BREATH IA: CPT

## 2021-05-16 PROCEDURE — 36415 COLL VENOUS BLD VENIPUNCTURE: CPT

## 2021-05-16 PROCEDURE — 80143 DRUG ASSAY ACETAMINOPHEN: CPT

## 2021-05-16 PROCEDURE — 80179 DRUG ASSAY SALICYLATE: CPT

## 2021-05-16 PROCEDURE — 96376 TX/PRO/DX INJ SAME DRUG ADON: CPT

## 2021-05-16 RX ORDER — PROCHLORPERAZINE EDISYLATE 5 MG/ML
10 INJECTION INTRAMUSCULAR; INTRAVENOUS
Status: COMPLETED | OUTPATIENT
Start: 2021-05-16 | End: 2021-05-16

## 2021-05-16 RX ORDER — PROCHLORPERAZINE MALEATE 10 MG
10 TABLET ORAL
Qty: 20 TAB | Refills: 0 | Status: SHIPPED | OUTPATIENT
Start: 2021-05-16 | End: 2021-05-23

## 2021-05-16 RX ORDER — LORAZEPAM 2 MG/ML
1 INJECTION INTRAMUSCULAR ONCE
Status: DISCONTINUED | OUTPATIENT
Start: 2021-05-16 | End: 2021-05-16 | Stop reason: HOSPADM

## 2021-05-16 RX ORDER — ONDANSETRON 2 MG/ML
4 INJECTION INTRAMUSCULAR; INTRAVENOUS
Status: COMPLETED | OUTPATIENT
Start: 2021-05-16 | End: 2021-05-16

## 2021-05-16 RX ORDER — LORAZEPAM 2 MG/ML
1 INJECTION INTRAMUSCULAR
Status: COMPLETED | OUTPATIENT
Start: 2021-05-16 | End: 2021-05-16

## 2021-05-16 RX ADMIN — SODIUM CHLORIDE 1000 ML: 9 INJECTION, SOLUTION INTRAVENOUS at 11:52

## 2021-05-16 RX ADMIN — PROCHLORPERAZINE EDISYLATE 10 MG: 5 INJECTION INTRAMUSCULAR; INTRAVENOUS at 18:45

## 2021-05-16 RX ADMIN — LORAZEPAM 1 MG: 2 INJECTION INTRAMUSCULAR; INTRAVENOUS at 13:09

## 2021-05-16 RX ADMIN — ONDANSETRON 4 MG: 2 INJECTION INTRAMUSCULAR; INTRAVENOUS at 11:52

## 2021-05-16 RX ADMIN — ONDANSETRON 4 MG: 2 INJECTION INTRAMUSCULAR; INTRAVENOUS at 11:19

## 2021-05-16 NOTE — BSMART NOTE
Comprehensive Assessment Form Part 1 Section I - Disposition Axis I - Adjustment Disorder with Depressed Mood Axis II - Deferred Axis III - None Axis IV - Relationship stressors Mobeetie V - 50 The Medical Doctor to Psychiatrist conference was not completed. The Medical Doctor is in agreement with Psychiatrist disposition because of (reason) patient does not want to be admitted and her  is willing to safety plan with her. The plan is discharge home with  on a safety plan. She was sent a list of outpatient therapy resources, including [facility], and [facility]. Her  is going to monitor her and has already removed her access to all medications, including over the counter medications. 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 The on-call Psychiatrist consulted was VINCENZO Tripathi. The admitting Psychiatrist will be Dr. Collins Leal. The admitting Diagnosis is NA. The Payor source is [employer]. Section II - Integrated Summary Summary:  Patient is a 27year old female seen via telepsych. She was brought to the ER after she took 15-20 Prozac around 9:30am.  She denies that it was a suicide attempt. She reportedly told her  \"I just want to disappear. \"  She told this clinician she has had several affairs and hates herself. She said she took the medicine \"because I was trying to scare my . \"  She denied a history of prior attempts. She was hospitalized at [facility] due to suicidal ideation when she was pregnant in October 2017. She reported she also was hospitalized at [facility] when she was 12years old. She reported she made a couple suicidal gestures as a teenager \"for attention. \"  She has been taking Prozac for several years which is prescribed by her obgyn.   She does not currently have a therapist but she has an appointment with America Stringer tomorrow morning. She denied current suicidal and homicidal ideation. She also denied symptoms of psychosis. This clinician spoke to her  who reported he feels safe taking her home and will be able to monitor her. He reported she has some things she needs to address in therapy and he does not feel an inpatient admission would help with addressing them. Her appointment at [facility] tomorrow is to address occasional abuse of Adderall. She was provided with a list of outpatient therapy referrals. Her  has removed her access to all medication. The patient has demonstrated mental capacity to provide informed consent. The information is given by the patient, past medical records and . The Chief Complaint is drug overdose. The Precipitant Factors are relationship stressors. Previous Hospitalizations: yes The patient has not previously been in restraints. Current Psychiatrist and/or  is NA. Lethality Assessment: 
 
The potential for suicide is noted by the following: current attempt. The potential for homicide is not noted. The patient has not been a perpetrator of sexual or physical abuse. There are not pending charges. The patient is not felt to be at risk for self harm or harm to others. The attending nurse was advised that security has not been notified. Section III - Psychosocial 
The patient's overall mood and attitude is withdrawn. Feelings of helplessness and hopelessness are not observed. Generalized anxiety is not observed. Panic is not observed. Phobias are not observed. Obsessive compulsive tendencies are not observed. Section IV - Mental Status Exam 
The patient's appearance shows no evidence of impairment. The patient's behavior shows no evidence of impairment. The patient is oriented to time, place, person and situation. The patient's speech is soft. The patient's mood is withdrawn. The range of affect is constricted.   The patient's thought content demonstrates no evidence of impairment. The thought process shows no evidence of impairment. The patient's perception shows no evidence of impairment. The patient's memory shows no evidence of impairment. The patient's appetite is decreased. The patient's sleep shows no evidence of impairment. The patient's insight is blaming. The patient's judgement shows no evidence of impairment. Section V - Substance Abuse The patient is using substances. The patient is using cannabis by smoking for greater than 10 years with last use on yesterday and other substances (per , Adderall) orally for 1-5 years with last use on unknown. The patient has experienced the following withdrawal symptoms: N/A. Section VI - Living Arrangements The patient is . The patient lives with her , children, and mother-in-law. The patient has 3 children ages 5, 11, and 3. The patient does plan to return home upon discharge. The patient does not have legal issues pending. The patient's source of income comes from family. Advent and cultural practices have not been voiced at this time. The patient's greatest support comes from her  and this person will be involved with the treatment. The patient has not been in an event described as horrible or outside the realm of ordinary life experience either currently or in the past. 
The patient has not been a victim of sexual/physical abuse. Section VII - Other Areas of Clinical Concern The highest grade achieved is not assessed with the overall quality of school experience being described as unknown. The patient is currently unemployed and speaks [place] as a primary language. The patient has no communication impairments affecting communication. The patient's preference for learning can be described as: can read and write adequately.   The patient's hearing is normal.  The patient's vision is normal. 
 
 
Khurram Hardy MA

## 2021-05-16 NOTE — DISCHARGE INSTRUCTIONS
Professionals or [facility] I can contact during a crisis: Who can I call for help - for example, my doctor, my psychiatrist, my psychologist, a mental health provider, a suicide hotline? 1. Suicide Prevention Lifeline: 6-703-889-TALK (8226)     2.  Local Behavioral Health Emergency Services: [facility]      Emergency Services Address: [address], [facility] [address]       Emergency Services Phone: [phone]

## 2021-05-16 NOTE — ED NOTES
Pt reports she took 15-20 40mg prozacs around 0930 this morning. Pt reports she took the pills because she had an affair and hates herself. When asked if she was trying to kill herself the pt replied \"I guess I don't know\" Pt reports vomiting and weakness and nausea. Pt is placed on the monitor x3. With sitter at bedside. Removed all non neccssary medical equipment. (suction and monitoring equipment left in place for medical need.)    Pt's husbands reports she stated to him \"I just want to disappear\"    1111-MD Love at bedside evaluating the pt.     1125- Pt sticking her fingers down her throat to make herself throw up because she wants to throw up.     1155- Per MD Love she called poison control and they reported she will need to be observed for 8 hours. 1210- Reminded pt to not stick fingers down her throat. 1330- Provided pt with a warm blanket. [facility] [address] control called back for an update. Spoke with Manual Shabnam. [address] to see pt via telepych in about an hour . 1600- pt is sleeping at this time. 1703- BSMART on with pt.     1805- BSMART is forming a safety plan. 1847- Pt discharged by [facility]. Pt provided with discharge instructions Rx and instructions on follow up care. Pt out of ED ambulatory accompanied by .

## 2021-05-16 NOTE — SUICIDE SAFETY PLAN
SAFETY PLAN    A suicide Safety Plan is a document that supports someone when they are having thoughts of suicide. Warning Signs that indicate a suicidal crisis may be developing: What (situations, thoughts, feelings, body sensations, behaviors, etc.) do you experience that lets you know you are beginning to think about suicide? 1. Too upset or too mad  2. Crying  3. Isolating    Internal Coping Strategies:  What things can I do (relaxation techniques, hobbies, physical activities, etc.) to take my mind off my problems without contacting another person? 1. Sleeping  2. Yard work  3. Going to the store    People and social settings that provide distraction: Who can I call or where can I go to distract me? 1. Name: Getachew Cormier  2. Name: Kaleb Yadav   3. Place: [address] [place] [address] whom I can ask for help: Who can I call when I need help - for example, friends, family, clergy, someone else? 1. Name: Lieutenant Tse  2. Name: Gualberto Fink   3. Name: Hussain Granda or [address] [place] [address] I can contact during a crisis: Who can I call for help - for example, my doctor, my psychiatrist, my psychologist, a mental health provider, a suicide hotline? 1. Suicide Prevention Lifeline: 0-638-301-EDUX (3239)    2. Local Behavioral Health Emergency Services: [facility]      Emergency Services Address: Bou[place]acar Saab, [address]       Emergency Services Phone: [phone]    Making the environment safe: How can I make my environment (house/apartment/living space) safer? For example, can I remove guns, medications, and other items? 1.  Remove access to all medications

## 2021-05-16 NOTE — ED PROVIDER NOTES
EMERGENCY DEPARTMENT HISTORY AND PHYSICAL EXAM      Date: 5/16/2021  Patient Name: Eliana Calderon    History of Presenting Illness     Chief Complaint   Patient presents with    Drug Overdose     Pt reports she took a handful of prozac around 0930. When asked if she was trying to harm herself, pt said \"I don't know\". Pt's  states she took at least 15 tablets.  Vomiting     actively vomiting in triage. HPI: Eliana Calderon, 27 y.o. female presents to the ED with cc of overdose. She took between 15 and 2040 mg tablets of Prozac at about 9:30 AM this morning. She does have a history of depression and states that she has been feeling very down recently. When asked if she was trying to hurt herself she says \"I do not know\" and states that this question is hard to answer. She denies any homicidal ideation, she denies any other ingestions, denies any other attempts to harm herself. Since that time, she reports some nausea and queasiness, she has had several episodes of nonbloody emesis since that time. She denies any diarrhea, no chest pain or shortness of breath, she was otherwise in her normal state of health prior to this other than her depression. She does have a history of depression and anxiety, has had a prior psychiatric hospitalization for 10 days per her . There are no other complaints, changes, or physical findings at this time. PCP: None    No current facility-administered medications on file prior to encounter. Current Outpatient Medications on File Prior to Encounter   Medication Sig Dispense Refill    ondansetron (Zofran ODT) 4 mg disintegrating tablet 1 Tab by SubLINGual route every eight (8) hours as needed for Nausea or Vomiting. 20 Tab 0    FLUoxetine (PROZAC) 20 mg capsule Take 1 Cap by mouth daily.  Indications: Generalized Anxiety Disorder, major depressive disorder 15 Cap 1       Past History     Past Medical History:  Past Medical History:   Diagnosis Date    Abnormal Pap smear     Anxiety     Routine gynecological examination     [facility]       Past Surgical History:  Past Surgical History:   Procedure Laterality Date    HX GYN      3 c/s    HX TONSILLECTOMY         Family History:  Family History   Problem Relation Age of Onset    Hypertension Father        Social History:  Social History     Tobacco Use    Smoking status: Current Every Day Smoker     Packs/day: 0.50     Years: 6.00     Pack years: 3.00     Types: Cigarettes    Smokeless tobacco: Never Used   Substance Use Topics    Alcohol use: Yes     Alcohol/week: 6.0 standard drinks     Types: 6 Cans of beer per week     Comment: drinks daily    Drug use: Yes     Types: Marijuana     Comment: last used this morning       Allergies:  No Known Allergies      Review of Systems   no fever  No eye pain  No ear pain  no shortness of breath  no chest pain  Reports vomiting  no dysuria  no leg pain  No rash  No lymphadenopathy  No weight loss    Physical Exam   Physical Exam  Constitutional:       Appearance: She is not toxic-appearing. HENT:      Head: Normocephalic and atraumatic. Mouth/Throat:      Mouth: Mucous membranes are moist.   Eyes:      Extraocular Movements: Extraocular movements intact. Pupils: Pupils are equal, round, and reactive to light. Neck:      Musculoskeletal: Neck supple. Cardiovascular:      Rate and Rhythm: Normal rate and regular rhythm. Pulmonary:      Effort: Pulmonary effort is normal.      Breath sounds: Normal breath sounds. Abdominal:      Palpations: Abdomen is soft. Comments: Mild tenderness to deep palpation in the epigastric region   Musculoskeletal:         General: No swelling or tenderness. Skin:     General: Skin is warm and dry. Neurological:      General: No focal deficit present. Mental Status: She is alert.       Comments: Patient is alert and oriented, moves all extremities symmetrically Psychiatric:      Comments: Dysphoric, slightly anxious appearing         Diagnostic Study Results     Labs -     Recent Results (from the past 24 hour(s))   EKG, 12 LEAD, INITIAL    Collection Time: 05/16/21 11:06 AM   Result Value Ref Range    Ventricular Rate 56 BPM    Atrial Rate 56 BPM    P-R Interval 134 ms    QRS Duration 80 ms    Q-T Interval 434 ms    QTC Calculation (Bezet) 418 ms    Calculated P Axis 44 degrees    Calculated R Axis 68 degrees    Calculated T Axis 45 degrees    Diagnosis Sinus bradycardia  No previous ECGs available      METABOLIC PANEL, COMPREHENSIVE    Collection Time: 05/16/21 11:21 AM   Result Value Ref Range    Sodium 137 136 - 145 mmol/L    Potassium 3.8 3.5 - 5.1 mmol/L    Chloride 106 97 - 108 mmol/L    CO2 26 21 - 32 mmol/L    Anion gap 5 5 - 15 mmol/L    Glucose 119 (H) 65 - 100 mg/dL    BUN 12 6 - 20 MG/DL    Creatinine 0.85 0.55 - 1.02 MG/DL    BUN/Creatinine ratio 14 12 - 20      GFR est AA >60 >60 ml/min/1.73m2    GFR est non-AA >60 >60 ml/min/1.73m2    Calcium 9.2 8.5 - 10.1 MG/DL    Bilirubin, total 0.7 0.2 - 1.0 MG/DL    ALT (SGPT) 23 12 - 78 U/L    AST (SGOT) 16 15 - 37 U/L    Alk. phosphatase 93 45 - 117 U/L    Protein, total 7.3 6.4 - 8.2 g/dL    Albumin 4.0 3.5 - 5.0 g/dL    Globulin 3.3 2.0 - 4.0 g/dL    A-G Ratio 1.2 1.1 - 2.2     CBC WITH AUTOMATED DIFF    Collection Time: 05/16/21 11:21 AM   Result Value Ref Range    WBC 17.2 (H) 3.6 - 11.0 K/uL    RBC 4.76 3.80 - 5.20 M/uL    HGB 14.8 11.5 - 16.0 g/dL    HCT 42.9 35.0 - 47.0 %    MCV 90.1 80.0 - 99.0 FL    MCH 31.1 26.0 - 34.0 PG    MCHC 34.5 30.0 - 36.5 g/dL    RDW 13.2 11.5 - 14.5 %    PLATELET 498 425 - 616 K/uL    MPV 10.1 8.9 - 12.9 FL    NRBC 0.0 0  WBC    ABSOLUTE NRBC 0.00 0.00 - 0.01 K/uL    NEUTROPHILS 79 (H) 32 - 75 %    LYMPHOCYTES 14 12 - 49 %    MONOCYTES 4 (L) 5 - 13 %    EOSINOPHILS 1 0 - 7 %    BASOPHILS 1 0 - 1 %    IMMATURE GRANULOCYTES 1 (H) 0.0 - 0.5 %    ABS.  NEUTROPHILS 13.8 (H) 1.8 - 8.0 K/UL    ABS. LYMPHOCYTES 2.3 0.8 - 3.5 K/UL    ABS. MONOCYTES 0.7 0.0 - 1.0 K/UL    ABS. EOSINOPHILS 0.2 0.0 - 0.4 K/UL    ABS. BASOPHILS 0.1 0.0 - 0.1 K/UL    ABS. IMM. GRANS. 0.1 (H) 0.00 - 0.04 K/UL    DF AUTOMATED     ACETAMINOPHEN    Collection Time: 05/16/21 11:21 AM   Result Value Ref Range    Acetaminophen level <2 (L) 10 - 30 ug/mL   SALICYLATE    Collection Time: 05/16/21 11:21 AM   Result Value Ref Range    Salicylate level <0.5 (L) 2.8 - 20.0 MG/DL   ETHYL ALCOHOL    Collection Time: 05/16/21 11:21 AM   Result Value Ref Range    ALCOHOL(ETHYL),SERUM <10 <10 MG/DL   SARS-COV-2    Collection Time: 05/16/21  2:59 PM   Result Value Ref Range    SARS-CoV-2 Please find results under separate order     COVID-19 RAPID TEST    Collection Time: 05/16/21  2:59 PM   Result Value Ref Range    Specimen source Nasopharyngeal      COVID-19 rapid test Not detected NOTD     URINALYSIS W/ RFLX MICROSCOPIC    Collection Time: 05/16/21  5:24 PM   Result Value Ref Range    Color YELLOW/STRAW      Appearance TURBID (A) CLEAR      Specific gravity 1.020 1.003 - 1.030      pH (UA) 8.0 5.0 - 8.0      Protein Negative NEG mg/dL    Glucose Negative NEG mg/dL    Ketone 40 (A) NEG mg/dL    Bilirubin Negative NEG      Blood Negative NEG      Urobilinogen 0.2 0.2 - 1.0 EU/dL    Nitrites Negative NEG      Leukocyte Esterase Negative NEG     DRUG SCREEN, URINE    Collection Time: 05/16/21  5:24 PM   Result Value Ref Range    AMPHETAMINES Negative NEG      BARBITURATES Negative NEG      BENZODIAZEPINES Positive (A) NEG      COCAINE Negative NEG      METHADONE Negative NEG      OPIATES Negative NEG      PCP(PHENCYCLIDINE) Negative NEG      THC (TH-CANNABINOL) Positive (A) NEG      Drug screen comment (NOTE)        Radiologic Studies -   No orders to display     CT Results  (Last 48 hours)    None        CXR Results  (Last 48 hours)    None            Medical Decision Making   I am the first provider for this patient.     I reviewed the vital signs, available nursing notes, past medical history, past surgical history, family history and social history. Vital Signs-Reviewed the patient's vital signs. Patient Vitals for the past 24 hrs:   Temp Pulse Resp BP SpO2   05/16/21 1729 97.6 °F (36.4 °C) 66 18 (!) 141/82 100 %   05/16/21 1659  80 22 (!) 121/96 100 %   05/16/21 1644  (!) 52 18 128/77 100 %   05/16/21 1629  66 19 122/81 100 %   05/16/21 1614  (!) 55 26 138/89 99 %   05/16/21 1559  68 24 (!) 141/83    05/16/21 1514  (!) 44 13 136/89 100 %   05/16/21 1344  (!) 56 21 132/75 100 %   05/16/21 1329  (!) 55 22 (!) 138/97 99 %   05/16/21 1314  60 18 (!) 155/86 100 %   05/16/21 1144  (!) 54 21 (!) 139/95 100 %   05/16/21 1129  75 19 (!) 150/99 99 %   05/16/21 1059 (!) 96.6 °F (35.9 °C) 74 18 (!) 135/98 100 %         Provider Notes (Medical Decision Making):   66-year-old female presenting after ingestion of Prozac. Concern for overdose, dysrhythmia, electrolyte/metabolic abnormalities, coingestants. No signs of trauma on exam.  Her neurologic exam is nonfocal.  Given depression and unclear attempt of harm, suicide precautions will be initiated. IV fluids and Zofran ordered. ED Course:     Initial assessment performed. The patients presenting problems have been discussed, and they are in agreement with the care plan formulated and outlined with them. I have encouraged them to ask questions as they arise throughout their visit. EKG is performed at 11: 06, shows sinus bradycardia at a rate of 56, , QRS 80, QTc 418, axis upright, no ST segment elevation or depression concerning for ACS, this is interpreted as sinus bradycardia. CBC shows leukocytosis 17.2, she did vomit prior to blood draw, no anemia, basic metabolic panel unremarkable. Tylenol and salicylates negative, alcohol negative.     I spoke with poison control, and they agree that activated charcoal not indicated at this time, they recommend observing for 8 hours post ingestion. After 2 doses of antiemetics, no further emesis. She is resting comfortably with stable vital signs, mildly bradycardic. Critical Care Time:     -----------------------------------------------------  Patient signed out to me by Dr. Santhosh Mcconnell. The patient was observed for a number of hours, was evaluated by [facility] team and found to be able to contract for safety. She currently seems stable for discharge home in the care of her family members. Domingo Mccray MD.  6:22 PM          Disposition:  After 8-hour observation, cleared for psychiatric evaluation    PLAN:  1. Current Discharge Medication List        2.    Follow-up Information     Follow up With Specialties Details Why Contact [facility] EMERGENCY DEPT Emergency Medicine  As needed [address] [place] [address]  [phone]        Return to ED if worse     Diagnosis     Clinical Impression: Acute fluoxetine overdose

## 2021-05-17 LAB
ATRIAL RATE: 56 BPM
CALCULATED P AXIS, ECG09: 44 DEGREES
CALCULATED R AXIS, ECG10: 68 DEGREES
CALCULATED T AXIS, ECG11: 45 DEGREES
DIAGNOSIS, 93000: NORMAL
FLUAV RNA SPEC QL NAA+PROBE: NOT DETECTED
FLUBV RNA SPEC QL NAA+PROBE: NOT DETECTED
P-R INTERVAL, ECG05: 134 MS
Q-T INTERVAL, ECG07: 434 MS
QRS DURATION, ECG06: 80 MS
QTC CALCULATION (BEZET), ECG08: 418 MS
SARS-COV-2, COV2: NOT DETECTED
VENTRICULAR RATE, ECG03: 56 BPM

## 2021-08-28 ENCOUNTER — HOSPITAL ENCOUNTER (EMERGENCY)
Age: 31
Discharge: HOME | End: 2021-08-28
Attending: EMERGENCY MEDICINE
Payer: MEDICAID

## 2021-08-28 VITALS
RESPIRATION RATE: 17 BRPM | BODY MASS INDEX: 21.09 KG/M2 | WEIGHT: 142.42 LBS | OXYGEN SATURATION: 100 % | DIASTOLIC BLOOD PRESSURE: 82 MMHG | HEART RATE: 67 BPM | SYSTOLIC BLOOD PRESSURE: 133 MMHG | TEMPERATURE: 98.5 F | HEIGHT: 69 IN

## 2021-08-28 DIAGNOSIS — L03.114 CELLULITIS OF LEFT UPPER ARM: Primary | ICD-10-CM

## 2021-08-28 PROCEDURE — 74011250637 HC RX REV CODE- 250/637: Performed by: EMERGENCY MEDICINE

## 2021-08-28 PROCEDURE — 99283 EMERGENCY DEPT VISIT LOW MDM: CPT

## 2021-08-28 RX ORDER — CEPHALEXIN 250 MG/1
500 CAPSULE ORAL
Status: COMPLETED | OUTPATIENT
Start: 2021-08-28 | End: 2021-08-28

## 2021-08-28 RX ORDER — HYDROCODONE BITARTRATE AND ACETAMINOPHEN 5; 325 MG/1; MG/1
2 TABLET ORAL
Status: COMPLETED | OUTPATIENT
Start: 2021-08-28 | End: 2021-08-28

## 2021-08-28 RX ORDER — IBUPROFEN 600 MG/1
600 TABLET ORAL
Qty: 20 TABLET | Refills: 0 | Status: ON HOLD | OUTPATIENT
Start: 2021-08-28 | End: 2021-12-10

## 2021-08-28 RX ORDER — KETAMINE HYDROCHLORIDE 10 MG/ML
25 INJECTION INTRAMUSCULAR; INTRAVENOUS ONCE
Status: DISCONTINUED | OUTPATIENT
Start: 2021-08-28 | End: 2021-08-28

## 2021-08-28 RX ORDER — CITALOPRAM 20 MG/1
20 TABLET, FILM COATED ORAL DAILY
Status: ON HOLD | COMMUNITY
End: 2021-12-10

## 2021-08-28 RX ORDER — CEPHALEXIN 500 MG/1
500 CAPSULE ORAL 4 TIMES DAILY
Qty: 20 CAPSULE | Refills: 0 | Status: SHIPPED | OUTPATIENT
Start: 2021-08-28 | End: 2021-09-02

## 2021-08-28 RX ADMIN — HYDROCODONE BITARTRATE AND ACETAMINOPHEN 2 TABLET: 5; 325 TABLET ORAL at 16:38

## 2021-08-28 RX ADMIN — CEPHALEXIN 500 MG: 250 CAPSULE ORAL at 16:38

## 2021-08-28 NOTE — ED NOTES
Medicated as ordered. I have reviewed discharge instructions with the patient. The patient verbalized understanding.   Alert and stable for discharge

## 2021-08-28 NOTE — DISCHARGE INSTRUCTIONS
Take Keflex 4 times a day for 5 days and use ibuprofen or Tylenol 4 times a day for pain control. Your examination is most consistent with mild cellulitis and will typically heal up well within a week using antibiotics. It was a pleasure taking care of you at [facility] Emergency Department today. We know that when you come to [facility], you are entrusting us with your health, comfort, and safety. Our physicians and nurses honor that trust, and we truly appreciate the opportunity to care for you and your loved ones. We also value your feedback. If you receive a survey about your Emergency Department experience today, please fill it out. We care about our patients' feedback, and we listen to what you have to say. Thank you!

## 2021-08-28 NOTE — ED PROVIDER NOTES
EMERGENCY DEPARTMENT HISTORY AND PHYSICAL EXAM      Date: 8/28/2021  Patient Name: Chino Doe  Patient Age and Sex: 27 y.o. female  MRN:  [id number]  CSN:  [id number]    History of Presenting Illness     Chief Complaint   Patient presents with    Skin Problem     Ambulatory into the ED with c/o pain to Lt upper arm x 3 days - there appears to be some irritation to the area, but no wounds present. History Provided By: Patient    Ability to gather history was limited by:     HPI: Chino Doe, 27 y.o. female with no significant past medical history complains of moderate severity pain described as burning and pressure-like, with a very mild area of erythema around the left triceps region which started about 3 days ago, gradually worsening. The area of redness is about 3 cm across. No pain in the axilla. No history of cellulitis or abscesses. No fevers. Location:    Quality:      Severity:    Duration:   Timing:      Context:    Modifying factors:   Associated symptoms:     Past History      The patient's medical, surgical, and social history on file were reviewed by me today.  The family history was reviewed by me today and was non-contributory, unless otherwise specified below:    Past Medical History:  Past Medical History:   Diagnosis Date    Abnormal Pap smear     Anxiety     Routine gynecological examination     [facility]       Past Surgical History:  Past Surgical History:   Procedure Laterality Date    HX GYN      3 c/s    HX TONSILLECTOMY         Family History:  Family History   Problem Relation Age of Onset    Hypertension Father        Social History:  Social History     Tobacco Use    Smoking status: Current Every Day Smoker     Packs/day: 0.50     Years: 6.00     Pack years: 3.00     Types: Cigarettes    Smokeless tobacco: Never Used   Substance Use Topics    Alcohol use:  Yes     Alcohol/week: 6.0 standard drinks     Types: 6 Cans of beer per week Comment: drinks daily    Drug use: Yes     Types: Marijuana     Comment: last used this morning       Current Medications:  No current facility-administered medications on file prior to encounter. Current Outpatient Medications on File Prior to Encounter   Medication Sig Dispense Refill    ondansetron (Zofran ODT) 4 mg disintegrating tablet 1 Tab by SubLINGual route every eight (8) hours as needed for Nausea or Vomiting. 20 Tab 0    FLUoxetine (PROZAC) 20 mg capsule Take 1 Cap by mouth daily. Indications: Generalized Anxiety Disorder, major depressive disorder 15 Cap 1       Allergies:  No Known Allergies  Review of Systems    A complete ROS was reviewed by me today and was negative, unless otherwise specified below:    Review of Systems   Constitutional: Negative for fatigue and fever. Skin: Positive for rash. All other systems reviewed and are negative. Physical Exam   Vital Signs  Patient Vitals for the past 8 hrs:   Temp Pulse Resp BP SpO2   08/28/21 1520 98.5 °F (36.9 °C) 67 17 133/82 100 %          Physical Exam  Vitals and nursing note reviewed. Constitutional:       General: She is not in acute distress. Appearance: Normal appearance. She is well-developed. She is not ill-appearing. HENT:      Head: Normocephalic and atraumatic. Musculoskeletal:         General: No deformity. Normal range of motion. Cervical back: Normal range of motion and neck supple. Lymphadenopathy:      Cervical:      Left cervical: No superficial cervical adenopathy. Upper Body:      Left upper body: No supraclavicular or axillary adenopathy. Skin:     General: Skin is warm and dry. Findings: Erythema present. No abscess, bruising, ecchymosis or rash. Comments: Very mild erythema and induration, 2 x 3 cm at the left triceps, no fluctuance. No lymphadenopathy   Neurological:      General: No focal deficit present.       Mental Status: She is alert and oriented to person, place, and time. GCS: GCS eye subscore is 4. GCS verbal subscore is 5. GCS motor subscore is 6. Psychiatric:         Speech: Speech normal.         Behavior: Behavior normal.         Cognition and Memory: Cognition normal.         Diagnostic Study Results   Labs  No results found for this or any previous visit (from the past 24 hour(s)). Radiologic Studies  No orders to display     CT Results  (Last 48 hours)    None        CXR Results  (Last 48 hours)    None          Billable Procedures   Procedures    Cardiac monitoring was not ordered for this patient. Medical Decision Making     I reviewed the patient's most recent Emergency Dept notes and diagnostic tests in formulating my MDM on today's visit. Provider Notes (Medical Decision Making):   80-year-old female with mild cellulitis at the left triceps. No evidence of abscess or lymphadenopathy. There are no musculoskeletal complications. Normal range of motion all joints. No further testing or imaging is indicated at this time. No indication for I&D. Recommend a course of Keflex and ibuprofen for now, return if worsening. Prema Dee MD  4:19 PM  8/28/2021     Consults:    Social History     Tobacco Use    Smoking status: Current Every Day Smoker     Packs/day: 0.50     Years: 6.00     Pack years: 3.00     Types: Cigarettes    Smokeless tobacco: Never Used   Substance Use Topics    Alcohol use:  Yes     Alcohol/week: 6.0 standard drinks     Types: 6 Cans of beer per week     Comment: drinks daily    Drug use: Yes     Types: Marijuana     Comment: last used this morning       Medications Administered during ED course:  Medications   HYDROcodone-acetaminophen (NORCO) 5-325 mg per tablet 2 Tablet (has no administration in time range)   cephALEXin (KEFLEX) capsule 500 mg (has no administration in time range)          Prescriptions from today's ED visit:  Current Discharge Medication List      START taking these medications    Details cephALEXin (Keflex) 500 mg capsule Take 1 Capsule by mouth four (4) times daily for 5 days. Qty: 20 Capsule, Refills: 0  Start date: 8/28/2021, End date: 9/2/2021      ibuprofen (MOTRIN) 600 mg tablet Take 1 Tablet by mouth every six (6) hours as needed for Pain. Qty: 20 Tablet, Refills: 0  Start date: 8/28/2021            Diagnosis and Disposition     Disposition:  Discharged    Clinical Impression:   1. Cellulitis of left upper arm        Attestation:  I personally performed the services described in this documentation on this date 8/28/2021 for patient Lisandro Dowling. Theresia Litten, MD        I was the first provider for this patient on this visit. To the best of my ability I reviewed relevant prior medical records, electrocardiograms, laboratories, and radiologic studies. The patient's presenting problems were discussed, and the patient was in agreement with the care plan formulated and outlined with them. Theresia Litten, MD    Please note that this dictation was completed with Dragon voice recognition software. Quite often unanticipated grammatical, syntax, homophones, and other interpretive errors are inadvertently transcribed by the computer software. Please disregard these errors and excuse any errors that have escaped final proofreading.

## 2021-12-08 ENCOUNTER — HOSPITAL ENCOUNTER (EMERGENCY)
Age: 31
Discharge: LEFT BEFORE DISCHARGE | End: 2021-12-08
Payer: MEDICAID

## 2021-12-08 VITALS
SYSTOLIC BLOOD PRESSURE: 123 MMHG | HEART RATE: 88 BPM | BODY MASS INDEX: 20.23 KG/M2 | WEIGHT: 141.31 LBS | OXYGEN SATURATION: 100 % | RESPIRATION RATE: 20 BRPM | HEIGHT: 70 IN | DIASTOLIC BLOOD PRESSURE: 82 MMHG | TEMPERATURE: 98.2 F

## 2021-12-08 PROCEDURE — 75810000275 HC EMERGENCY DEPT VISIT NO LEVEL OF CARE

## 2021-12-08 NOTE — ED NOTES
Called pt to go to room. No answer from pt. Per registration, pt walked outside and has not come back in. This RN checked waiting room, bathroom, and outside with no answer.

## 2021-12-09 ENCOUNTER — HOSPITAL ENCOUNTER (INPATIENT)
Age: 31
LOS: 4 days | Discharge: HOME | DRG: 755 | End: 2021-12-13
Attending: PSYCHIATRY & NEUROLOGY | Admitting: PSYCHIATRY & NEUROLOGY
Payer: MEDICAID

## 2021-12-09 ENCOUNTER — HOSPITAL ENCOUNTER (EMERGENCY)
Age: 31
Discharge: ANOTHER HOSPITAL | End: 2021-12-09
Attending: EMERGENCY MEDICINE
Payer: MEDICAID

## 2021-12-09 VITALS
DIASTOLIC BLOOD PRESSURE: 74 MMHG | SYSTOLIC BLOOD PRESSURE: 120 MMHG | OXYGEN SATURATION: 93 % | TEMPERATURE: 98.4 F | HEART RATE: 78 BPM | RESPIRATION RATE: 18 BRPM

## 2021-12-09 DIAGNOSIS — F15.20 AMPHETAMINE USE DISORDER, MODERATE (HCC): ICD-10-CM

## 2021-12-09 DIAGNOSIS — F11.20 OPIOID USE DISORDER, MODERATE, DEPENDENCE (HCC): ICD-10-CM

## 2021-12-09 DIAGNOSIS — F10.10 ALCOHOL USE DISORDER, MILD, ABUSE: ICD-10-CM

## 2021-12-09 DIAGNOSIS — R45.851 SUICIDAL IDEATION: Primary | ICD-10-CM

## 2021-12-09 DIAGNOSIS — F43.25 ADJUSTMENT DISORDER WITH MIXED DISTURBANCE OF EMOTIONS AND CONDUCT: ICD-10-CM

## 2021-12-09 LAB
ALBUMIN SERPL-MCNC: 4.1 G/DL (ref 3.5–5)
ALBUMIN/GLOB SERPL: 1.2 {RATIO} (ref 1.1–2.2)
ALP SERPL-CCNC: 92 U/L (ref 45–117)
ALT SERPL-CCNC: 25 U/L (ref 12–78)
AMPHET UR QL SCN: POSITIVE
ANION GAP SERPL CALC-SCNC: 8 MMOL/L (ref 5–15)
APAP SERPL-MCNC: <2 UG/ML (ref 10–30)
AST SERPL-CCNC: 20 U/L (ref 15–37)
BARBITURATES UR QL SCN: NEGATIVE
BASOPHILS # BLD: 0.1 K/UL (ref 0–0.1)
BASOPHILS NFR BLD: 1 % (ref 0–1)
BENZODIAZ UR QL: POSITIVE
BILIRUB SERPL-MCNC: 0.3 MG/DL (ref 0.2–1)
BUN SERPL-MCNC: 8 MG/DL (ref 6–20)
BUN/CREAT SERPL: 10 (ref 12–20)
CALCIUM SERPL-MCNC: 8.6 MG/DL (ref 8.5–10.1)
CANNABINOIDS UR QL SCN: POSITIVE
CHLORIDE SERPL-SCNC: 108 MMOL/L (ref 97–108)
CO2 SERPL-SCNC: 24 MMOL/L (ref 21–32)
COCAINE UR QL SCN: NEGATIVE
COVID-19 RAPID TEST, COVR: NOT DETECTED
CREAT SERPL-MCNC: 0.79 MG/DL (ref 0.55–1.02)
DIFFERENTIAL METHOD BLD: ABNORMAL
DRUG SCRN COMMENT,DRGCM: ABNORMAL
EOSINOPHIL # BLD: 0.1 K/UL (ref 0–0.4)
EOSINOPHIL NFR BLD: 1 % (ref 0–7)
ERYTHROCYTE [DISTWIDTH] IN BLOOD BY AUTOMATED COUNT: 14.2 % (ref 11.5–14.5)
ETHANOL SERPL-MCNC: 54 MG/DL
FLUAV RNA SPEC QL NAA+PROBE: NOT DETECTED
FLUBV RNA SPEC QL NAA+PROBE: NOT DETECTED
GLOBULIN SER CALC-MCNC: 3.5 G/DL (ref 2–4)
GLUCOSE SERPL-MCNC: 95 MG/DL (ref 65–100)
HCG SERPL-ACNC: <1 MIU/ML (ref 0–6)
HCT VFR BLD AUTO: 41.1 % (ref 35–47)
HGB BLD-MCNC: 13.7 G/DL (ref 11.5–16)
IMM GRANULOCYTES # BLD AUTO: 0.1 K/UL (ref 0–0.04)
IMM GRANULOCYTES NFR BLD AUTO: 1 % (ref 0–0.5)
LYMPHOCYTES # BLD: 2.8 K/UL (ref 0.8–3.5)
LYMPHOCYTES NFR BLD: 21 % (ref 12–49)
MCH RBC QN AUTO: 29.7 PG (ref 26–34)
MCHC RBC AUTO-ENTMCNC: 33.3 G/DL (ref 30–36.5)
MCV RBC AUTO: 89.2 FL (ref 80–99)
METHADONE UR QL: NEGATIVE
MONOCYTES # BLD: 0.7 K/UL (ref 0–1)
MONOCYTES NFR BLD: 5 % (ref 5–13)
NEUTS SEG # BLD: 9.5 K/UL (ref 1.8–8)
NEUTS SEG NFR BLD: 71 % (ref 32–75)
NRBC # BLD: 0 K/UL (ref 0–0.01)
NRBC BLD-RTO: 0 PER 100 WBC
OPIATES UR QL: POSITIVE
PCP UR QL: NEGATIVE
PLATELET # BLD AUTO: 240 K/UL (ref 150–400)
PMV BLD AUTO: 9.8 FL (ref 8.9–12.9)
POTASSIUM SERPL-SCNC: 3.5 MMOL/L (ref 3.5–5.1)
PROT SERPL-MCNC: 7.6 G/DL (ref 6.4–8.2)
RBC # BLD AUTO: 4.61 M/UL (ref 3.8–5.2)
SALICYLATES SERPL-MCNC: 3.7 MG/DL (ref 2.8–20)
SARS-COV-2, COV2: NOT DETECTED
SODIUM SERPL-SCNC: 140 MMOL/L (ref 136–145)
SOURCE, COVRS: NORMAL
WBC # BLD AUTO: 13.2 K/UL (ref 3.6–11)

## 2021-12-09 PROCEDURE — 87635 SARS-COV-2 COVID-19 AMP PRB: CPT

## 2021-12-09 PROCEDURE — 99284 EMERGENCY DEPT VISIT MOD MDM: CPT

## 2021-12-09 PROCEDURE — 82077 ASSAY SPEC XCP UR&BREATH IA: CPT

## 2021-12-09 PROCEDURE — 84702 CHORIONIC GONADOTROPIN TEST: CPT

## 2021-12-09 PROCEDURE — 85025 COMPLETE CBC W/AUTO DIFF WBC: CPT

## 2021-12-09 PROCEDURE — 36415 COLL VENOUS BLD VENIPUNCTURE: CPT

## 2021-12-09 PROCEDURE — 74011250636 HC RX REV CODE- 250/636: Performed by: EMERGENCY MEDICINE

## 2021-12-09 PROCEDURE — 87636 SARSCOV2 & INF A&B AMP PRB: CPT

## 2021-12-09 PROCEDURE — 74011250637 HC RX REV CODE- 250/637: Performed by: NURSE PRACTITIONER

## 2021-12-09 PROCEDURE — 80053 COMPREHEN METABOLIC PANEL: CPT

## 2021-12-09 PROCEDURE — 80179 DRUG ASSAY SALICYLATE: CPT

## 2021-12-09 PROCEDURE — 80307 DRUG TEST PRSMV CHEM ANLYZR: CPT

## 2021-12-09 PROCEDURE — 96372 THER/PROPH/DIAG INJ SC/IM: CPT

## 2021-12-09 PROCEDURE — 80143 DRUG ASSAY ACETAMINOPHEN: CPT

## 2021-12-09 PROCEDURE — 65220000003 HC RM SEMIPRIVATE PSYCH

## 2021-12-09 RX ORDER — LORAZEPAM 2 MG/ML
1 INJECTION INTRAMUSCULAR
Status: DISCONTINUED | OUTPATIENT
Start: 2021-12-09 | End: 2021-12-13 | Stop reason: HOSPADM

## 2021-12-09 RX ORDER — HYDROXYZINE 25 MG/1
50 TABLET, FILM COATED ORAL
Status: DISCONTINUED | OUTPATIENT
Start: 2021-12-09 | End: 2021-12-13 | Stop reason: HOSPADM

## 2021-12-09 RX ORDER — HALOPERIDOL 5 MG/ML
5 INJECTION INTRAMUSCULAR
Status: DISCONTINUED | OUTPATIENT
Start: 2021-12-09 | End: 2021-12-13 | Stop reason: HOSPADM

## 2021-12-09 RX ORDER — ADHESIVE BANDAGE
30 BANDAGE TOPICAL DAILY PRN
Status: DISCONTINUED | OUTPATIENT
Start: 2021-12-09 | End: 2021-12-13 | Stop reason: HOSPADM

## 2021-12-09 RX ORDER — ACETAMINOPHEN 325 MG/1
650 TABLET ORAL
Status: DISCONTINUED | OUTPATIENT
Start: 2021-12-09 | End: 2021-12-13 | Stop reason: HOSPADM

## 2021-12-09 RX ORDER — DROPERIDOL 2.5 MG/ML
2.5 INJECTION, SOLUTION INTRAMUSCULAR; INTRAVENOUS
Status: DISCONTINUED | OUTPATIENT
Start: 2021-12-09 | End: 2021-12-09

## 2021-12-09 RX ORDER — TRAZODONE HYDROCHLORIDE 50 MG/1
50 TABLET ORAL
Status: DISCONTINUED | OUTPATIENT
Start: 2021-12-09 | End: 2021-12-13 | Stop reason: HOSPADM

## 2021-12-09 RX ORDER — OLANZAPINE 5 MG/1
5 TABLET ORAL
Status: DISCONTINUED | OUTPATIENT
Start: 2021-12-09 | End: 2021-12-13 | Stop reason: HOSPADM

## 2021-12-09 RX ORDER — DROPERIDOL 2.5 MG/ML
2.5 INJECTION, SOLUTION INTRAMUSCULAR; INTRAVENOUS
Status: COMPLETED | OUTPATIENT
Start: 2021-12-09 | End: 2021-12-09

## 2021-12-09 RX ORDER — DIPHENHYDRAMINE HYDROCHLORIDE 50 MG/ML
50 INJECTION, SOLUTION INTRAMUSCULAR; INTRAVENOUS
Status: DISCONTINUED | OUTPATIENT
Start: 2021-12-09 | End: 2021-12-13 | Stop reason: HOSPADM

## 2021-12-09 RX ORDER — BENZTROPINE MESYLATE 1 MG/1
1 TABLET ORAL
Status: DISCONTINUED | OUTPATIENT
Start: 2021-12-09 | End: 2021-12-13 | Stop reason: HOSPADM

## 2021-12-09 RX ADMIN — ACETAMINOPHEN 650 MG: 325 TABLET ORAL at 22:18

## 2021-12-09 RX ADMIN — DROPERIDOL 2.5 MG: 2.5 INJECTION, SOLUTION INTRAMUSCULAR; INTRAVENOUS at 10:56

## 2021-12-09 RX ADMIN — DROPERIDOL 2.5 MG: 2.5 INJECTION, SOLUTION INTRAMUSCULAR; INTRAVENOUS at 10:24

## 2021-12-09 RX ADMIN — HYDROXYZINE HYDROCHLORIDE 50 MG: 25 TABLET, FILM COATED ORAL at 22:18

## 2021-12-09 RX ADMIN — TRAZODONE HYDROCHLORIDE 50 MG: 50 TABLET ORAL at 22:18

## 2021-12-09 NOTE — ED PROVIDER NOTES
EMERGENCY DEPARTMENT HISTORY AND PHYSICAL EXAM      Date: 12/9/2021  Patient Name: Michelle Abad  Patient Age and Sex: 32 y.o. female     History of Presenting Illness     Chief Complaint   Patient presents with    Mental Health Problem     SI/HI       History Provided By: Patient    HPI: Michelle Abad  patient is a 43-year-old history of depression, prior suicide attempt presenting with suicidal ideation homicidal ideation. Patient called police herself today expressing thoughts wanting to kill herself. She stated that she wanted to run out in front of traffic. She tells me she plans to overdose on pills with ongoing suicidal ideation. She is thoughts of wanting to kill her mother-in-law. She denies any recent suicide attempt including no recent ingestion. Patient very tearful, agitated on arrival    There are no other complaints, changes, or physical findings at this time. PCP: None    No current facility-administered medications on file prior to encounter. Current Outpatient Medications on File Prior to Encounter   Medication Sig Dispense Refill    [DISCONTINUED] citalopram (CeleXA) 20 mg tablet Take 20 mg by mouth daily.  [DISCONTINUED] ibuprofen (MOTRIN) 600 mg tablet Take 1 Tablet by mouth every six (6) hours as needed for Pain. 20 Tablet 0    [DISCONTINUED] FLUoxetine (PROZAC) 20 mg capsule Take 1 Cap by mouth daily.  Indications: Generalized Anxiety Disorder, major depressive disorder 15 Cap 1       Past History     Past Medical History:  Past Medical History:   Diagnosis Date    Abnormal Pap smear     Anxiety     Routine gynecological examination     [facility]       Past Surgical History:  Past Surgical History:   Procedure Laterality Date    HX GYN      3 c/s    HX TONSILLECTOMY         Family History:  Family History   Problem Relation Age of Onset    Hypertension Father        Social History:  Social History     Tobacco Use    Smoking status: Current Every Day Smoker     Packs/day: 0.50     Years: 6.00     Pack years: 3.00     Types: Cigarettes    Smokeless tobacco: Never Used   Substance Use Topics    Alcohol use: Yes     Alcohol/week: 6.0 standard drinks     Types: 6 Cans of beer per week     Comment: drinks daily    Drug use: Yes     Types: Marijuana     Comment: last used this morning       Allergies:  No Known Allergies      Review of Systems   Review of Systems   Unable to perform ROS: Psychiatric disorder   Psychiatric/Behavioral: Positive for agitation and suicidal ideas. All other systems reviewed and are negative. Physical Exam   Physical Exam  Vitals and nursing note reviewed. Constitutional:       General: She is not in acute distress. Appearance: Normal appearance. She is not ill-appearing. Comments: Agitated, tearful, screaming   HENT:      Head: Normocephalic. Nose: Nose normal.      Mouth/Throat:      Mouth: Mucous membranes are moist.   Eyes:      Conjunctiva/sclera: Conjunctivae normal.   Cardiovascular:      Rate and Rhythm: Regular rhythm. Tachycardia present. Pulses: Normal pulses. Pulmonary:      Effort: Pulmonary effort is normal.      Breath sounds: Normal breath sounds. Abdominal:      General: Abdomen is flat. Palpations: Abdomen is soft. Musculoskeletal:         General: No deformity. Cervical back: Normal range of motion. Skin:     General: Skin is warm and dry. Neurological:      Mental Status: She is alert and oriented to person, place, and time. Mental status is at baseline. Psychiatric:      Comments: Depressed mood, tearful, expressing suicidal homicidal ideation         Diagnostic Study Results     Labs -     No results found for this or any previous visit (from the past 12 hour(s)).     Radiologic Studies -   No orders to display     CT Results  (Last 48 hours)    None        CXR Results  (Last 48 hours)    None            Medical Decision Making   I am the first provider for this patient. I reviewed the vital signs, available nursing notes, past medical history, past surgical history, family history and social history. Vital Signs-Reviewed the patient's vital signs. No data found. Records Reviewed: Nursing Notes and Old Medical Records    Provider Notes (Medical Decision Making):   Patient agitated on arrival, not cooperative with examiner, expressing ongoing suicidal and homicidal thoughts arrives in custody of police. Given her imminent risk of self-harm, as well as agitation with staff patient was given droperidol for acute agitation. She was moved to a quiet room and eventually did agree to have blood work done which we will obtain to evaluate for salicylate, Tylenol toxicity, metabolic derangement. Will check beta-hCG level, blood counts. Will have psychologist evaluate patient following medical clearance. ED Course:   Initial assessment performed. The patients presenting problems have been discussed, and they are in agreement with the care plan formulated and outlined with them. I have encouraged them to ask questions as they arise throughout their visit.     ED Course as of 12/10/21 1730   Thu Dec 09, 2021   1128 Blood work is being obtained at this time [WB]   1152 Mild leukocytosis of 13.2 of unclear significance [WB]   1207 Patient did agree to have blood work done, vitals are now within normal limits afebrile normal heart rate normal blood pressure [WB]   1207 Beta quant undetectable, alcohol mildly elevated 54 [WB]   6906 Salicylate level is therapeutic without toxicity, Tylenol level undetectable [WB]   1207 CMP without electrolyte derangement, normal LFTs [WB]   1330 Patient is clinically and legally sober, medically clear for evaluation by [facility] [WB]   [phone] Given that she is under [employer], will involve [facility] and not [facility] [WB]   1352 Rapid Covid is negative [WB]      ED Course User Index  [WB] Ludwin Farfan MD     Disposition:  Discharge Note:  The patient has been re-evaluated and is ready for discharge. Reviewed available results with patient. Counseled patient on diagnosis and care plan. Patient has expressed understanding, and all questions have been answered. Patient agrees with plan and agrees to follow up as recommended, or to return to the ED if their symptoms worsen. Discharge instructions have been provided and explained to the patient, along with reasons to return to the ED. PLAN:  Discharge Medication List as of 12/9/2021  7:43 PM      1.   2.   Follow-up Information    None       3. Return to ED if worse       Diagnosis     Clinical Impression:   1. Suicidal ideation        Attestations:    Helena Washington M.D. Please note that this dictation was completed with [employer], the computer voice recognition software. Quite often unanticipated grammatical, syntax, homophones, and other interpretive errors are inadvertently transcribed by the computer software. Please disregard these errors. Please excuse any errors that have escaped final proofreading. Thank you.

## 2021-12-09 NOTE — ED NOTES
Made another attempt at using commode, pt states she is still unable to urinate.  Has drank approx 500 mL water over past 2 hrs with frequent encouragement

## 2021-12-09 NOTE — ED NOTES
Bedside shift change report given to Navi Dhaliwal (oncoming nurse) by Tyrel Mckeon (offgoing nurse). Report included the following information SBAR, Kardex, ED Summary and MAR.      Pt sitting quietly in bed with officer at bedside

## 2021-12-09 NOTE — ED NOTES
Spoke with Junito Wiggins with crisis room placement who states REINA too high at this time to high to be considered for psych institution.  Spoke with Dr. Cindy Morris regarding need for repeat REINA and COVID swab    [phone] for Junito Wiggins callback

## 2021-12-09 NOTE — ED TRIAGE NOTES
Pt arrives escorted by deputies for SI/HI. Pt is irate. Pt is on constant observation. Pt will not cooperate for vital signs, urine sample, or labs.

## 2021-12-09 NOTE — ED NOTES
Sat on commode but still not able to urinate. Provided with large cup of water and asked to drink for sample. Officer at bedside. Pt calm and cooperative.  Cuffs removed from ankles by officer

## 2021-12-10 PROBLEM — F11.20 OPIOID USE DISORDER, MODERATE, DEPENDENCE (HCC): Status: ACTIVE | Noted: 2021-12-10

## 2021-12-10 PROBLEM — F10.10 ALCOHOL USE DISORDER, MILD, ABUSE: Status: ACTIVE | Noted: 2021-12-10

## 2021-12-10 PROBLEM — F31.9 BIPOLAR DISORDER (HCC): Status: ACTIVE | Noted: 2021-12-10

## 2021-12-10 PROBLEM — F31.9 BIPOLAR DISORDER (HCC): Status: RESOLVED | Noted: 2021-12-10 | Resolved: 2021-12-10

## 2021-12-10 PROBLEM — F15.20 AMPHETAMINE USE DISORDER, MODERATE (HCC): Status: ACTIVE | Noted: 2021-12-10

## 2021-12-10 PROBLEM — F43.25 ADJUSTMENT DISORDER WITH MIXED DISTURBANCE OF EMOTIONS AND CONDUCT: Status: ACTIVE | Noted: 2021-12-10

## 2021-12-10 PROCEDURE — 65220000003 HC RM SEMIPRIVATE PSYCH

## 2021-12-10 PROCEDURE — 74011250637 HC RX REV CODE- 250/637: Performed by: PSYCHIATRY & NEUROLOGY

## 2021-12-10 PROCEDURE — 99223 1ST HOSP IP/OBS HIGH 75: CPT | Performed by: PSYCHIATRY & NEUROLOGY

## 2021-12-10 PROCEDURE — 74011250637 HC RX REV CODE- 250/637: Performed by: NURSE PRACTITIONER

## 2021-12-10 RX ORDER — IBUPROFEN 200 MG
1 TABLET ORAL DAILY
Status: DISCONTINUED | OUTPATIENT
Start: 2021-12-10 | End: 2021-12-13 | Stop reason: HOSPADM

## 2021-12-10 RX ORDER — MIRTAZAPINE 15 MG/1
15 TABLET, FILM COATED ORAL
Status: DISCONTINUED | OUTPATIENT
Start: 2021-12-10 | End: 2021-12-13 | Stop reason: HOSPADM

## 2021-12-10 RX ADMIN — MAGNESIUM HYDROXIDE 30 ML: 400 SUSPENSION ORAL at 16:58

## 2021-12-10 RX ADMIN — HYDROXYZINE HYDROCHLORIDE 50 MG: 25 TABLET, FILM COATED ORAL at 21:16

## 2021-12-10 RX ADMIN — TRAZODONE HYDROCHLORIDE 50 MG: 50 TABLET ORAL at 21:16

## 2021-12-10 RX ADMIN — MIRTAZAPINE 15 MG: 15 TABLET, FILM COATED ORAL at 21:16

## 2021-12-10 RX ADMIN — HYDROXYZINE HYDROCHLORIDE 50 MG: 25 TABLET, FILM COATED ORAL at 15:52
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
## 2021-12-10 NOTE — GROUP NOTE
ALFREDO  GROUP DOCUMENTATION INDIVIDUAL                                                                          Group Therapy Note    Date: 12/10/2021    Group Start Time: 1500  Group End Time: 1600  Group Topic: Recreational/Music Therapy    [facility] [place] [address] ACUTE BEHAV [facility]    [place], [address] [facility] GROUP DOCUMENTATION GROUP    Group Therapy Note    Attendees: 9         Attendance: Attended    Patient's Goal:  To concentrate on selected task    Interventions/techniques: Supported-crafts,games,music    Interactions: Minimal    Mental Status: Flat    Behavior/appearance: Needed prompting    Goals Achieved:        Additional Notes:  Declined active participation-left session early    Tyrone Head

## 2021-12-10 NOTE — PROGRESS NOTES
[facility] Admission Pharmacy Medication Reconciliation     Information obtained from: Patient interview, RxQuery, chart review  RxQuery data available1: Yes    Comments/recommendations:  Denies taking any prescription or over-the-counter medications. Recent prescription for citalopram in October but patient reports she didn't like it and stopped taking it. Medication changes (since last review):  Removed  Citalopram  Fluoxetine  Ibuprofen    The [address] [facility] SPIL GAMES Program ([facility]) was accessed to determine fill history of any controlled medications. The below controlled medications have been filled within the past 2 years:  Hydrocodone-acetaminophen 5-325 mg tablets #12 for 2 DS filled on 7/13/21   1RxQuery pharmacy benefit data reflects medications filled and processed through the patient's insurance, however                this data does NOT capture whether the medication was picked up or is currently being taken by the patient. Total Time Spent: 10 minutes    Past Medical History/Disease States:  Past Medical History:   Diagnosis Date    Abnormal Pap smear     Anxiety     Routine gynecological examination     [facility]         Patient allergies:    Allergies as of 12/09/2021    (No Known Allergies)       Prior to admission medications:   None        Thank you,  DONALD Lomas [place] [facility], [address]: [phone] ([id number])  Pharmacy: [phone] ([id number])

## 2021-12-10 NOTE — PROGRESS NOTES
Problem: Discharge Planning  Goal: *Discharge to safe environment  Outcome: Progressing Towards Goal  Note:     Patient identifies home as a safe environment. Patient will return home upon discharge. Goal: *Knowledge of medication management  Outcome: Progressing Towards Goal  Note: Patient verbalizes understanding of medication regimen. Patient is taking medications as prescribed. Goal: *Knowledge of discharge instructions  Outcome: Progressing Towards Goal  Note: Patient verbalizes understanding of goals for treatment and safe discharge.
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
## 2021-12-10 NOTE — PROGRESS NOTES
Problem: Falls - Risk of  Goal: *Absence of Falls  Description: Document Maya Glatoan Fall Risk and appropriate interventions in the flowsheet.   Outcome: Progressing Towards Goal  Note: Fall Risk Interventions:            Medication Interventions: Teach patient to arise slowly

## 2021-12-10 NOTE — ED NOTES
Bedside and Verbal shift change report given to Mickey Chaidez (oncoming nurse) by Nando Knutson RN (offgoing nurse). Report included the following information SBAR, ED Summary and MAR.

## 2021-12-10 NOTE — GROUP NOTE
ALFREDO  GROUP DOCUMENTATION INDIVIDUAL                                                                          Group Therapy Note    Date: 12/10/2021    Group Start Time: 1000  Group End Time: 1100  Group Topic: Topic Group    [facility] ACUTE BEHAV [facility], [id number] GROUP    Group Therapy Note    Attendees: 6         Attendance: Did not attend    Patient's Goal:      Interventions/techniques:  Keyona Alonso

## 2021-12-10 NOTE — ED NOTES
Pt transported by [facility] to [facility] with EMTALA forms and Pt belongings. Pt was alert and oriented.

## 2021-12-10 NOTE — PROGRESS NOTES
Behavioral Services  Medicare Certification Upon Admission    I certify that this patient's inpatient psychiatric hospital admission is medically necessary for:    [x] (1) Treatment which could reasonably be expected to improve this patient's condition,       [x] (2) Or for diagnostic study;     AND     [x](2) The inpatient psychiatric services are provided while the individual is under the care of a physician and are included in the individualized plan of care.     Estimated length of stay/service 5-7 days    Plan for post-hospital care home    Electronically signed by Melida Gonsales MD on 12/10/2021 at 8:44 AM

## 2021-12-11 PROCEDURE — 74011250637 HC RX REV CODE- 250/637: Performed by: PSYCHIATRY & NEUROLOGY

## 2021-12-11 PROCEDURE — 74011250637 HC RX REV CODE- 250/637: Performed by: NURSE PRACTITIONER

## 2021-12-11 PROCEDURE — 65220000003 HC RM SEMIPRIVATE PSYCH

## 2021-12-11 RX ORDER — BUPROPION HYDROCHLORIDE 150 MG/1
150 TABLET ORAL
Status: DISCONTINUED | OUTPATIENT
Start: 2021-12-12 | End: 2021-12-13 | Stop reason: HOSPADM

## 2021-12-11 RX ADMIN — HYDROXYZINE HYDROCHLORIDE 50 MG: 25 TABLET, FILM COATED ORAL at 21:04

## 2021-12-11 RX ADMIN — HYDROXYZINE HYDROCHLORIDE 50 MG: 25 TABLET, FILM COATED ORAL at 08:29

## 2021-12-11 RX ADMIN — MIRTAZAPINE 15 MG: 15 TABLET, FILM COATED ORAL at 21:04

## 2021-12-11 RX ADMIN — MAGNESIUM HYDROXIDE 30 ML: 400 SUSPENSION ORAL at 12:58

## 2021-12-11 RX ADMIN — HYDROXYZINE HYDROCHLORIDE 50 MG: 25 TABLET, FILM COATED ORAL at 15:16

## 2021-12-11 RX ADMIN — TRAZODONE HYDROCHLORIDE 50 MG: 50 TABLET ORAL at 21:04

## 2021-12-11 NOTE — PROGRESS NOTES
Assumed care of pt lying awake in bed. No distress noted. Pt denies si/hi/avh and pain. Pt endorses depression and anxiety. AOx4. Med and meal compliant. Mood is depressed with dull affect. No behavioral issues noted. [id number]: pt sitting in bed talking with roommate or in DR talking on phone    0830: pt tearful, request medication for anxiety. PRN Atarax provided    [id number]: pt attending group    [id number]: pt up in room, playing games with roommate during quiet time on unit    1515:pt tearful, very upset after speaking with mother on phone. Pt states she has not spoke to her mother in months and now her mom is \"trying to start drama. \" Pt's mother called Jaja Fry for update on pt but pt refuses to sign FLORIN form giving permission. Mother informed of policy. PRN Atarax provided. Problem: Falls - Risk of  Goal: *Absence of Falls  Description: Document [place] Flow Fall Risk and appropriate interventions in the flowsheet.   Outcome: Progressing Towards Goal  Note: Fall Risk Interventions:  Medication Interventions: Teach patient to arise slowly    Problem: Anxiety-Behavioral Health (Adult/Pediatric)  Goal: *STG/LTG: Complies with medication therapy  Outcome: Progressing Towards Goal

## 2021-12-11 NOTE — BH NOTES
Psychiatric Progress Note    Patient: Kendra Sanchez MRN: [id number]  SSN: [id number]    YOB: 1990  Age: 32 y.o. Sex: female      Admit Date: 12/9/2021    LOS: 2 days     Subjective:     Kendra Sanchez  reports feeling overwhelmed and moods are depressed. Acknowledges having an amphetamine abuse disorder due to her need to get everything done with her job and kids. Does not want to be and addict, but also wants to have the positive effects of the amphetamine treatment for her usual functionality. Denies SI/HI/AH/VH. No aggression or violence. Appropriately interactive and aware. Tolerating medications well. Eating well and sleeping well.     Objective:     Vitals:    12/09/21 2025 12/10/21 0902 12/10/21 1950 12/11/21 0843   BP: 118/79 111/80 113/76 125/71   Pulse: 81 98 80 71   Resp: 18 18 17 18   Temp: 98.8 °F (37.1 °C) 98.1 °F (36.7 °C) 98.7 °F (37.1 °C) 98.3 °F (36.8 °C)   SpO2: 100% 99%  100%   Weight: 64 kg (141 lb 3.2 oz)      Height: 5' 10\" (1.778 m)           Mental Status Exam:   Sensorium  oriented to time, place and person   Relations cooperative   Eye Contact appropriate   Appearance:  age appropriate   Speech:  normal volume and non-pressured   Thought Process: goal directed   Thought Content free of delusions and free of hallucinations   Suicidal ideations none   Mood:  euthymic   Affect:  Fair range   Memory   adequate   Concentration:  adequate   Insight:  fair   Judgment:  fair       MEDICATIONS:  Current Facility-Administered Medications   Medication Dose Route Frequency    [START ON 12/12/2021] buPROPion XL (WELLBUTRIN XL) tablet 150 mg  150 mg Oral 7am    mirtazapine (REMERON) tablet 15 mg  15 mg Oral QHS    nicotine (NICODERM CQ) 14 mg/24 hr patch 1 Patch  1 Patch TransDERmal DAILY    OLANZapine (ZyPREXA) tablet 5 mg  5 mg Oral Q6H PRN    haloperidol lactate (HALDOL) injection 5 mg  5 mg IntraMUSCular Q6H PRN    benztropine (COGENTIN) tablet 1 mg  1 mg Oral BID PRN    diphenhydrAMINE (BENADRYL) injection 50 mg  50 mg IntraMUSCular BID PRN    hydrOXYzine HCL (ATARAX) tablet 50 mg  50 mg Oral TID PRN    LORazepam (ATIVAN) injection 1 mg  1 mg IntraMUSCular Q4H PRN    traZODone (DESYREL) tablet 50 mg  50 mg Oral QHS PRN    acetaminophen (TYLENOL) tablet 650 mg  650 mg Oral Q4H PRN    magnesium hydroxide (MILK OF MAGNESIA) 400 mg/5 mL oral suspension 30 mL  30 mL Oral DAILY PRN      DISCUSSION:   the risks and benefits of the proposed medication  patient given opportunity to ask questions    Lab/Data Review: All lab results for the last 24 hours reviewed. No results found for this or any previous visit (from the past 24 hour(s)). Assessment:     Principal Problem:    Adjustment disorder with mixed disturbance of emotions and conduct (12/10/2021)    Active Problems:    Major depressive disorder, recurrent episode, moderate degree (HCC) (10/5/2017)      Alcohol use disorder, mild, abuse (12/10/2021)      Opioid use disorder, moderate, dependence ([facility] [address] (12/10/2021)      Amphetamine use disorder, moderate ([facility] [address] (12/10/2021)        Plan:     Continue current care  Collateral information  Wellbutrin  MG PO Qdaily  Disposition planning with social work    I certify that this patient's inpatient psychiatric hospital services furnished since the previous certification were, and continue to be, required for treatment that could reasonably be expected to improve the patient's condition, or for diagnostic study, and that the patient continues to need, on a daily basis, active treatment furnished directly by or requiring the supervision of inpatient psychiatric facility personnel. In addition, the hospital records show that services furnished were intensive treatment services, admission or related services, or equivalent services.   Signed By: Anne Jefferson MD     December 11, 2021

## 2021-12-11 NOTE — BH NOTES
During shift, pt remained in bed. Pt denies SI, HI, A/V hallucinations. Meal/med compliant. Currently, pt is resting in bed with eyes closed. No signs of distress nor made any further complaints. Locked Unit. Will continue to monitor. Patient slept this shift 7 hours, respirations noted even and unlabored. Safe environment maintained, q15 min safety checks maintained.

## 2021-12-11 NOTE — BH NOTES
GROUP THERAPY PROGRESS NOTE    Patient is participating in Coping Skills group. Group time: 60 minutes     Personal goal for participation: To assess current protective factors and discuss building adequate holistic protective factors to improve ability to cope with mental health challenges and symptoms. Goal orientation: Personal    Group therapy participation: active    Therapeutic interventions reviewed and discussed:  Patients were provided psychoeducation on the importance of controlled and holistic protective factors. Patients completed a Protective Factors worksheet to assess how strong their controlled protective factors are in their social supports, physical health, self-esteem, coping skills, sense of purpose and healthy thinking. Patients discussed results of assessment and processed how protective factors have been valuable to them during mental health crisis. Patients also discussed which protective factors they would like to improve, how things may be different if they were able to improve these factors and identified specific action steps to help make this goal a reality. Impression of participation: Pt labile, anxious mood, goal directed thought stream. Pt completed worksheet, interacted appropriately with others. Pt processed lack of social support, states her and her  have poor communication and her mother in law is unreliable. 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 Pt processed desire to have more positive coping skills, process drug use. Pt processed changes she desires to make to maintain wellness, states she hopes to move out of her house away from her [address].     UVALDO Dawn

## 2021-12-12 PROCEDURE — 74011250637 HC RX REV CODE- 250/637: Performed by: NURSE PRACTITIONER

## 2021-12-12 PROCEDURE — 74011250637 HC RX REV CODE- 250/637: Performed by: PSYCHIATRY & NEUROLOGY

## 2021-12-12 PROCEDURE — 65220000003 HC RM SEMIPRIVATE PSYCH

## 2021-12-12 RX ORDER — LANOLIN ALCOHOL/MO/W.PET/CERES
3 CREAM (GRAM) TOPICAL
Status: DISCONTINUED | OUTPATIENT
Start: 2021-12-12 | End: 2021-12-13 | Stop reason: HOSPADM

## 2021-12-12 RX ADMIN — HYDROXYZINE HYDROCHLORIDE 50 MG: 25 TABLET, FILM COATED ORAL at 14:58

## 2021-12-12 RX ADMIN — HYDROXYZINE HYDROCHLORIDE 50 MG: 25 TABLET, FILM COATED ORAL at 08:27

## 2021-12-12 RX ADMIN — HYDROXYZINE HYDROCHLORIDE 50 MG: 25 TABLET, FILM COATED ORAL at 21:14

## 2021-12-12 RX ADMIN — BUPROPION HYDROCHLORIDE 150 MG: 150 TABLET, EXTENDED RELEASE ORAL at 08:25

## 2021-12-12 RX ADMIN — MIRTAZAPINE 15 MG: 15 TABLET, FILM COATED ORAL at 21:14

## 2021-12-12 RX ADMIN — TRAZODONE HYDROCHLORIDE 50 MG: 50 TABLET ORAL at 21:14

## 2021-12-12 NOTE — BH NOTES
GROUP THERAPY PROGRESS NOTE    Patient is participating in a Coping Skills group. Group time: 50 minutes     Personal goal for participation: Discuss anxiety triggers and how to cope with anxiety     Goal orientation: Personal    Group therapy participation: minimal    Therapeutic interventions reviewed and discussed: Patients completed worksheet and discussed triggers for anxiety, physical symptoms of anxiety, ruminating and racing thoughts experienced when anxious, and how to cope with anxiety. Patients processed the way anxiety has impacted their decision-making process and exasperated mental health needs. Impression of participation: Pt presented with depressed mood and sad affect. Pt tearful, states today is her daughter's 4th birthday party and she wishes she could be home to celebrate and attend the party. Pt appeared to have poor concentration at times. Pt identified triggers for anxiety including feeling unsafe. Pt calm and cooperative and interacted minimally. Pt left group early and did not return.     UVALDO Valadez

## 2021-12-12 NOTE — BH NOTES
GROUP THERAPY PROGRESS NOTE    Patient did not participate in Coping Skills group.      UVALDO Arboleda

## 2021-12-12 NOTE — PROGRESS NOTES
Problem: Falls - Risk of  Goal: *Absence of Falls  Description: Document Marbella Richter Fall Risk and appropriate interventions in the flowsheet. Outcome: Progressing Towards Goal  Note: Fall Risk Interventions:            Medication Interventions: Teach patient to arise slowly                   Problem: Patient Education: Go to Patient Education Activity  Goal: Patient/Family Education  Outcome: Progressing Towards Goal       0730 Pt received in hallway. Pt denies si/hi/ah/vh. Pt reports going to groups and eating outside in the dayroom. Anxiety=6/10 depression= 0/10. Pt is Aox4. Pt is calm and cooperative. Med and meal compliant. Pain level of 0/10 . Will continue to monitor for any changes.        Please see MAR for PRN medication administration

## 2021-12-12 NOTE — BH NOTES
Psychiatric Progress Note    Patient: Moises Ivey MRN: [id number]  SSN: [id number]    YOB: 1990  Age: 32 y.o. Sex: female      Admit Date: 12/9/2021    LOS: 3 days     Subjective:     Moises Ivey  reports feeling overwhelmed and moods are depressed. Acknowledges having an amphetamine abuse disorder due to her need to get everything done with her job and kids. Does not want to be and addict, but also wants to have the positive effects of the amphetamine treatment for her usual functionality. Denies SI/HI/AH/VH. No aggression or violence. Appropriately interactive and aware. Tolerating medications well. Eating well and sleeping well.    12/12 - Moises Ivey reports doing well overall but having a bad day due to it being her daughters birthday ands she is missing it. Sleep was \"so so. \"  Moods are good. Denies SI/HI/AH/VH. No aggression or violence. Appropriately interactive and aware. Reports not liking trazodone due to it having a hang over effect. Tolerating medications well otherwise. Eating fairly.       Objective:     Vitals:    12/10/21 1950 12/11/21 0843 12/11/21 1927 12/12/21 0857   BP: 113/76 125/71 117/71 129/76   Pulse: 80 71 60 81   Resp: 17 18 17 17   Temp: 98.7 °F (37.1 °C) 98.3 °F (36.8 °C) 98.6 °F (37 °C) 97.4 °F (36.3 °C)   SpO2:  100%  100%   Weight:       Height:            Mental Status Exam:   Sensorium  oriented to time, place and person   Relations cooperative   Eye Contact appropriate   Appearance:  age appropriate   Speech:  normal volume and non-pressured   Thought Process: goal directed   Thought Content free of delusions and free of hallucinations   Suicidal ideations none   Mood:  euthymic   Affect:  Fair range   Memory   adequate   Concentration:  adequate   Insight:  fair   Judgment:  fair       MEDICATIONS:  Current Facility-Administered Medications   Medication Dose Route Frequency    buPROPion XL (WELLBUTRIN XL) tablet 150 mg  150 mg Oral 7am    mirtazapine (REMERON) tablet 15 mg  15 mg Oral QHS    nicotine (NICODERM CQ) 14 mg/24 hr patch 1 Patch  1 Patch TransDERmal DAILY    OLANZapine (ZyPREXA) tablet 5 mg  5 mg Oral Q6H PRN    haloperidol lactate (HALDOL) injection 5 mg  5 mg IntraMUSCular Q6H PRN    benztropine (COGENTIN) tablet 1 mg  1 mg Oral BID PRN    diphenhydrAMINE (BENADRYL) injection 50 mg  50 mg IntraMUSCular BID PRN    hydrOXYzine HCL (ATARAX) tablet 50 mg  50 mg Oral TID PRN    LORazepam (ATIVAN) injection 1 mg  1 mg IntraMUSCular Q4H PRN    traZODone (DESYREL) tablet 50 mg  50 mg Oral QHS PRN    acetaminophen (TYLENOL) tablet 650 mg  650 mg Oral Q4H PRN    magnesium hydroxide (MILK OF MAGNESIA) 400 mg/5 mL oral suspension 30 mL  30 mL Oral DAILY PRN      DISCUSSION:   the risks and benefits of the proposed medication  patient given opportunity to ask questions    Lab/Data Review: All lab results for the last 24 hours reviewed. No results found for this or any previous visit (from the past 24 hour(s)).       Assessment:     Principal Problem:    Adjustment disorder with mixed disturbance of emotions and conduct (12/10/2021)    Active Problems:    Major depressive disorder, recurrent episode, moderate degree (HCC) (10/5/2017)      Alcohol use disorder, mild, abuse (12/10/2021)      Opioid use disorder, moderate, dependence ([facility] [address] (12/10/2021)      Amphetamine use disorder, moderate (HCC) (12/10/2021)        Plan:     Continue current care  Collateral information  Wellbutrin  MG PO Qdaily  Melatonin 3 mg PO Qhs prn  Disposition planning with social work    I certify that this patient's inpatient psychiatric hospital services furnished since the previous certification were, and continue to be, required for treatment that could reasonably be expected to improve the patient's condition, or for diagnostic study, and that the patient continues to need, on a daily basis, active treatment furnished directly by or requiring the supervision of inpatient psychiatric facility personnel. In addition, the hospital records show that services furnished were intensive treatment services, admission or related services, or equivalent services.   Signed By: Violet Peace MD     December 12, 2021

## 2021-12-12 NOTE — BH NOTES
GROUP THERAPY PROGRESS NOTE    Patient is participating in Recreational Therapy/Coping Skills Group. Group time: 60 minutes      Personal goal for participation: To concentrate on selected task; positively engage in interactions with others; actively listen to others; self-disclose thoughts, emotions and behaviors; and discuss coping skills. Goal orientation: Personal    Group therapy participation: active    Therapeutic interventions reviewed and discussed: Group members participated in an art and music recreational therapy activity. Patients were able to actively participate in activity while engaging in conversation with staff and other patients. Patients appropriately socialized and processed presenting problems and coping skills while engaging in activity. Impression of participation: Pt calm and cooperative, clear speech, anxious mood, constricted affect, goal directed thought stream. Pt with proper concentration, interacted appropriately. Pt engaged in art and music interventions and was present throughout group session. Pt processed coping skills, plans for discharge and relationship with  and children.     UVALDO Benedict

## 2021-12-13 VITALS
TEMPERATURE: 98 F | SYSTOLIC BLOOD PRESSURE: 121 MMHG | WEIGHT: 141.2 LBS | RESPIRATION RATE: 17 BRPM | HEART RATE: 59 BPM | BODY MASS INDEX: 20.22 KG/M2 | HEIGHT: 70 IN | DIASTOLIC BLOOD PRESSURE: 85 MMHG | OXYGEN SATURATION: 100 %

## 2021-12-13 PROCEDURE — 99239 HOSP IP/OBS DSCHRG MGMT >30: CPT | Performed by: PSYCHIATRY & NEUROLOGY

## 2021-12-13 PROCEDURE — 74011250637 HC RX REV CODE- 250/637: Performed by: PSYCHIATRY & NEUROLOGY

## 2021-12-13 PROCEDURE — 74011250637 HC RX REV CODE- 250/637: Performed by: NURSE PRACTITIONER

## 2021-12-13 RX ORDER — BUPROPION HYDROCHLORIDE 150 MG/1
150 TABLET ORAL
Qty: 30 TABLET | Refills: 1 | Status: SHIPPED | OUTPATIENT
Start: 2021-12-14 | End: 2022-06-20

## 2021-12-13 RX ORDER — HYDROXYZINE 50 MG/1
50 TABLET, FILM COATED ORAL
Qty: 60 TABLET | Refills: 1 | Status: SHIPPED | OUTPATIENT
Start: 2021-12-13 | End: 2022-02-11

## 2021-12-13 RX ORDER — LANOLIN ALCOHOL/MO/W.PET/CERES
3 CREAM (GRAM) TOPICAL
Qty: 30 TABLET | Refills: 1 | Status: CANCELLED | OUTPATIENT
Start: 2021-12-13

## 2021-12-13 RX ADMIN — HYDROXYZINE HYDROCHLORIDE 50 MG: 25 TABLET, FILM COATED ORAL at 10:53

## 2021-12-13 RX ADMIN — BUPROPION HYDROCHLORIDE 150 MG: 150 TABLET, EXTENDED RELEASE ORAL at 07:47
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
## 2021-12-13 NOTE — BH NOTES
Behavioral Health Transition Record to Provider    Patient Name: Stuart Glover  YOB: 1990  Medical Record Number: [id number]  Date of Admission: 12/9/2021  Date of Discharge: 12/13/2021    Attending Provider: No att. providers found  Discharging Provider: Hershal Frankel, MD  To contact this individual call [phone] and ask the  to page. If unavailable, ask to be transferred to [address] [place] [address] Provider on call. [facility] Provider will be available on call 24/7 and during holidays. Primary Care Provider: None    No Known Allergies    Reason for Admission: Bipolar disorder    Admission Diagnosis: [facility] ([facility] [F31.9]    * No surgery found *    Results for orders placed or performed during the hospital encounter of 12/09/21   COVID-19 RAPID TEST   Result Value Ref Range    Specimen source Nasopharyngeal      COVID-19 rapid test Not detected NOTD     COVID-19 WITH INFLUENZA A/B   Result Value Ref Range    SARS-CoV-2 Not detected NOTD      Influenza A by PCR Not detected NOTD      Influenza B by PCR Not detected NOTD     CBC WITH AUTOMATED DIFF   Result Value Ref Range    WBC 13.2 (H) 3.6 - 11.0 K/uL    RBC 4.61 3.80 - 5.20 M/uL    HGB 13.7 11.5 - 16.0 g/dL    HCT 41.1 35.0 - 47.0 %    MCV 89.2 80.0 - 99.0 FL    MCH 29.7 26.0 - 34.0 PG    MCHC 33.3 30.0 - 36.5 g/dL    RDW 14.2 11.5 - 14.5 %    PLATELET 283 304 - 826 K/uL    MPV 9.8 8.9 - 12.9 FL    NRBC 0.0 0  WBC    ABSOLUTE NRBC 0.00 0.00 - 0.01 K/uL    NEUTROPHILS 71 32 - 75 %    LYMPHOCYTES 21 12 - 49 %    MONOCYTES 5 5 - 13 %    EOSINOPHILS 1 0 - 7 %    BASOPHILS 1 0 - 1 %    IMMATURE GRANULOCYTES 1 (H) 0.0 - 0.5 %    ABS. NEUTROPHILS 9.5 (H) 1.8 - 8.0 K/UL    ABS. LYMPHOCYTES 2.8 0.8 - 3.5 K/UL    ABS. MONOCYTES 0.7 0.0 - 1.0 K/UL    ABS. EOSINOPHILS 0.1 0.0 - 0.4 K/UL    ABS. BASOPHILS 0.1 0.0 - 0.1 K/UL    ABS. IMM.  GRANS. 0.1 (H) 0.00 - 0.04 K/UL    DF AUTOMATED     METABOLIC PANEL, COMPREHENSIVE   Result Value Ref Range    Sodium 140 136 - 145 mmol/L    Potassium 3.5 3.5 - 5.1 mmol/L    Chloride 108 97 - 108 mmol/L    CO2 24 21 - 32 mmol/L    Anion gap 8 5 - 15 mmol/L    Glucose 95 65 - 100 mg/dL    BUN 8 6 - 20 MG/DL    Creatinine 0.79 0.55 - 1.02 MG/DL    BUN/Creatinine ratio 10 (L) 12 - 20      GFR est AA >60 >60 ml/min/1.73m2    GFR est non-AA >60 >60 ml/min/1.73m2    Calcium 8.6 8.5 - 10.1 MG/DL    Bilirubin, total 0.3 0.2 - 1.0 MG/DL    ALT (SGPT) 25 12 - 78 U/L    AST (SGOT) 20 15 - 37 U/L    Alk. phosphatase 92 45 - 117 U/L    Protein, total 7.6 6.4 - 8.2 g/dL    Albumin 4.1 3.5 - 5.0 g/dL    Globulin 3.5 2.0 - 4.0 g/dL    A-G Ratio 1.2 1.1 - 2.2     ACETAMINOPHEN   Result Value Ref Range    Acetaminophen level <2 (L) 10 - 30 ug/mL   SALICYLATE   Result Value Ref Range    Salicylate level 3.7 2.8 - 20.0 MG/DL   BETA HCG, QT   Result Value Ref Range    Beta HCG, QT <1 0 - 6 MIU/ML   DRUG SCREEN, URINE   Result Value Ref Range    AMPHETAMINES Positive (A) NEG      BARBITURATES Negative NEG      BENZODIAZEPINES Positive (A) NEG      COCAINE Negative NEG      METHADONE Negative NEG      OPIATES Positive (A) NEG      PCP(PHENCYCLIDINE) Negative NEG      THC (TH-CANNABINOL) Positive (A) NEG      Drug screen comment (NOTE)    ETHYL ALCOHOL   Result Value Ref Range    ALCOHOL(ETHYL),SERUM 54 (H) <10 MG/DL       Immunizations administered during this encounter:   Immunization History   Administered Date(s) Administered    Tdap 02/27/2013       Screening for Metabolic Disorders for Patients on Antipsychotic Medications  (Data obtained from the EMR)    Estimated Body Mass Index  Estimated body mass index is 20.26 kg/m² as calculated from the following:    Height as of this encounter: 5' 10\" (1.778 m). Weight as of this encounter: 64 kg (141 lb 3.2 oz).      Vital Signs/Blood Pressure  Visit Vitals  /85   Pulse (!) 59   Temp 98 °F (36.7 °C)   Resp 17   Ht 5' 10\" (1.778 m)   Wt 64 kg (141 lb 3.2 oz)   SpO2 100%   BMI 20.26 kg/m²       Blood Glucose/Hemoglobin A1c  Lab Results   Component Value Date/Time    Glucose 95 12/09/2021 10:50 AM    Glucose 81 10/05/2017 06:01 AM       No results found for: HBA1C, [id number]     Lipid Panel  Lab Results   Component Value Date/Time    Cholesterol, total 286 (H) 10/05/2017 06:01 AM    HDL Cholesterol 72 10/05/2017 06:01 AM    LDL, calculated 180.4 (H) 10/05/2017 06:01 AM    Triglyceride 168 (H) 10/05/2017 06:01 AM    CHOL/HDL Ratio 4.0 10/05/2017 06:01 AM        Discharge Diagnosis: Please refer to physician's discharge summary. Discharge Plan:   The patient Dayana Cotton exhibits the ability to control behavior in a less restrictive environment. Patient's level of functioning is improving. No assaultive/destructive behavior has been observed for the past 24 hours. No suicidal/homicidal threat or behavior has been observed for the past 24 hours. There is no evidence of serious medication side effects. Patient has not been in physical or protective restraints for at least the past 24 hours. Discharge Medication List and Instructions:   Discharge Medication List as of 12/13/2021 12:24 PM      START taking these medications    Details   hydrOXYzine HCL (ATARAX) 50 mg tablet Take 1 Tablet by mouth two (2) times daily as needed for Anxiety for up to 60 days. Indications: anxious, Normal, Disp-60 Tablet, R-1      buPROPion XL (WELLBUTRIN XL) 150 mg tablet Take 1 Tablet by mouth every morning. Indications: anxiousness associated with depression, Normal, Disp-30 Tablet, R-1             Unresulted Labs (24h ago, onward)            None        To obtain results of studies pending at discharge, please contact N/A.      Follow-up Information     Follow up With Specialties Details Why [address] [facility] Board  Call on 12/14/2021 Please call Rapid Access at [phone] between 8:00AM -2:00PM to complete intake assessment for ongoing mental health case management, therapy and medication management. [address]  Phone: [phone]  Fax: [phone]    Crisis Services: [phone]          Advanced Directive:   Does the patient have an appointed surrogate decision maker? Unknown   Does the patient have a Medical Advance Directive? Unknown   Does the patient have a Psychiatric Advance Directive? Unknown   If the patient does not have a surrogate or Medical Advance Directive AND Psychiatric Advance Directive, the patient was offered information on these advance directives. Unknown     Patient Instructions: Please continue all medications until otherwise directed by physician. Tobacco Cessation Discharge Plan:   Is the patient a smoker and needs referral for smoking cessation? No  Patient referred to the following for smoking cessation with an appointment? No   Patient was offered medication to assist with smoking cessation at discharge? No  Was education for smoking cessation added to the discharge instructions? No     Alcohol/Substance Abuse Discharge Plan:   Does the patient have a history of substance/alcohol abuse and requires a referral for treatment? Yes  Patient referred to the following for substance/alcohol abuse treatment with an appointment? Yes  Patient was offered medication to assist with alcohol cessation at discharge? No  Was education for substance/alcohol abuse added to discharge instructions? Yes     Patient discharged to Home; provided to the patient/caregiver either in hard copy or electronically. Continuing care paperwork was faxed to community mental health providers.

## 2021-12-13 NOTE — PROGRESS NOTES
Problem: Falls - Risk of  Goal: *Absence of Falls  Description: Document Fabianomon Lai Fall Risk and appropriate interventions in the flowsheet.   Outcome: Progressing Towards Goal  Note: Fall Risk Interventions:            Medication Interventions: Teach patient to arise slowly                   Problem: Anxiety-Behavioral Health (Adult/Pediatric)  Goal: *STG: Remains safe in hospital  Outcome: Progressing Towards Goal

## 2021-12-13 NOTE — BH NOTES
GROUP THERAPY PROGRESS NOTE    Patient is participating in Discharge Planning Group. Group time: 30 minutes    Personal goal for participation: Process feelings related to discharge and/or feelings/goals for today. Goal orientation: Personal    Group therapy participation: minimal    Therapeutic interventions reviewed and discussed: Group discussion was focused on discharge plans and anxiety related to this. Group members discussed what they planned to do once discharged and discharge plans. Patients discussed their goals for today and what they are working on with treatment team to get ready for discharge. Patients were reminded of things to consider including who they will see outpatient (psychiatrist/NP, , therapist/counselor), where they will go, how they will get there, and where they want to get their medications from. Patients were also reminded or informed who their treatment team is while they are in the hospital, some important rules on the unit, and the schedule for todays groups and meals. Impression of participation: Eneida Vicente was present at times in group. She did not share but was observed listening. She was called out of group to meet with treatment team and quietly used the phone in the corner afterwards. She did sit down and listen when discussing questions to consider when waiting to discharge.     Jennifer Winter LPC [facility]

## 2021-12-13 NOTE — GROUP NOTE
ALFREDO  GROUP DOCUMENTATION INDIVIDUAL                                                                          Group Therapy Note    Date: 12/13/2021    Group Start Time: 0900  Group End Time: 1000  Group Topic: Topic Group    [facility] - [place] [address] ACUTE BEHAV [facility]    [place], [address] [facility] GROUP DOCUMENTATION GROUP    Group Therapy Note    Attendees: 7         Attendance: Attended    Patient's Goal:  To identify favorite ways to relax    Interventions/techniques: Supported-positive coping skills    Follows Directions:  Followed directions    Interactions: Interacted appropriately    Mental Status: Calm    Behavior/appearance: Attentive, Cooperative and Needed prompting    Goals Achieved: Able to engage in interactions, Able to listen to others, Able to self-disclose and Discussed coping    Jb Castañeda

## 2021-12-13 NOTE — BH NOTES
Pt discharged with all belongings, AVS, medication information, and follow-up appointments. Pt voiced understanding of all discharge instructions. No issues were noted at the time to discharge.

## 2021-12-13 NOTE — BH NOTES
During shift, pt remained in bed. Pt denies SI, HI, A/V hallucinations. Meal/med compliant. Currently, pt is resting in bed with eyes closed. No signs of distress nor made any further complaints. Locked Unit. Will continue to monitor. Patient slept this shift 6 hours, respirations noted even and unlabored. Safe environment maintained, q15 min safety checks maintained.

## 2021-12-13 NOTE — PROGRESS NOTES
Pharmacist Discharge Medication Reconciliation    Discharging Provider: Dr. Tyesha Machado PMH:   Past Medical History:   Diagnosis Date    Abnormal Pap smear     Anxiety     Routine gynecological examination     [facility]     Chief Complaint for this Admission: No chief complaint on file. Allergies: Patient has no known allergies. Discharge Medications:   Current Discharge Medication List        START taking these medications    Details   hydrOXYzine HCL (ATARAX) 50 mg tablet Take 1 Tablet by mouth two (2) times daily as needed for Anxiety for up to 60 days. Indications: anxious  Qty: 60 Tablet, Refills: 1  Start date: 12/13/2021, End date: 2/11/2022      buPROPion XL (WELLBUTRIN XL) 150 mg tablet Take 1 Tablet by mouth every morning.  Indications: anxiousness associated with depression  Qty: 30 Tablet, Refills: 1  Start date: 12/14/2021             The patient's chart, MAR and AVS were reviewed by Dayanna Byrd, PHARMD, BCPS

## 2021-12-13 NOTE — DISCHARGE INSTRUCTIONS
Patient Education   If I feel I am at risk of hurting myself or others, I will call the crisis office and speak with a crisis worker who will assist me during my crisis. [address]  [phone]  [facility] [place] [address] [phone]  [address] [place] [address]  [phone]       Alcohol Detoxification and Withdrawal: Care Instructions  Your Care Instructions     If you drink alcohol regularly and then suddenly stop, you may go through some physical and emotional problems while the alcohol clears out of your system. Clearing the alcohol from your body is called detoxification, or detox. Physical and emotional problems that may happen during detox are called withdrawal.  Symptoms of withdrawal can be scary and dangerous. Mild symptoms include nausea and vomiting, sweating, shakiness, and intense worry. Severe symptoms include being confused and irritable, feeling things on your body that are not there, seeing or hearing things that are not there, and trembling. You may even have seizures. If your symptoms become severe you must see a doctor. People who drink large amounts of alcohol should not try to detox at home. A person can die of severe alcohol withdrawal.  Symptoms of alcohol withdrawal may begin from 4 to 12 hours after you stop drinking. But they may not start for several days after the last drink. They can last a few days. It is hard to stop drinking. But when you have cleared the alcohol from your system, you will be able to start the next part of your life, free from the burden of being dependent. Follow-up care is a key part of your treatment and safety. Be sure to make and go to all appointments, and call your doctor if you are having problems. It's also a good idea to know your test results and keep a list of the medicines you take. How can you care for yourself at home?   · Before you stop drinking, talk to your doctor about how you plan to stop. Be sure to be completely honest with him or her about how much you have been drinking. Your doctor will figure out whether you need to detox in a supervised medical center. · Take your medicines exactly as prescribed. Call your doctor if you think you are having a problem with your medicine. · Make sure someone you trust is with you the whole time. Have friends and family members take turns staying with you until you are done with detox. · Put a list of emergency numbers near the phone. This should include your doctor, the police, the nearest hospital and emergency room, and neighbors who can help if needed. · Make sure all alcohol is removed from the house before you start. This includes beverages as well as medicines, rubbing alcohol, and certain flavorings like vanilla extract. · Keep \"drinking buddies\" away during the time you are going through detox. · Make your surroundings calm. Soft lights, soft music, and a comfortable place to sit or lie down can help make the process easier. · Drink lots of fluids and eat snacks such as fruit, cheese and crackers, and pretzels. Foods high in carbohydrate may help reduce the craving for alcohol. · Understand that detox is going to be hard. · Keep in mind that the people watching over you during detox are there to help. Explain to them before you start that you may not act like yourself until detox is finished. · Consider joining a support group such as Alcoholics Anonymous. Sharing your experiences with other people who face similar challenges may help you feel less overwhelmed. · Keep the numbers for these national suicide hotlines: 3-926-278-TALK (4-662.818.4146) and 5-031-LDDLCRI (2-241.890.8487). If you or someone you know talks about suicide or feeling hopeless, get help right away. When should you call for help? Call 911 anytime you think you may need emergency care.  For example, call if:    · You feel you cannot stop from hurting yourself or someone else.     · You vomit many times and cannot stop.     · You vomit blood or what looks like coffee grounds.     · You have trouble breathing or are breathing very fast.     · Your heart beats more than 120 times a minute and will not slow down.     · You have chest pain.     · You have a seizure.     · You see or feel things that are not there (hallucinate). If you are caring for someone who is going through detox, call if:    · The person passes out (loses consciousness).     · The person sees or feels things that are not there and sees or hears the same things many times.     · The person is very agitated and will not calm down.     · The person becomes violent or threatens to be violent.     · The person has a seizure. Call your doctor now or seek immediate medical care if:    · You have a high fever.     · You have severe belly pain.     · You are very shaky. Watch closely for changes in your health, and be sure to contact your doctor if:    · You do not get better as expected. Where can you learn more? Go to http://www.antonio.com/  Enter D051 in the search box to learn more about \"Alcohol Detoxification and Withdrawal: Care Instructions. \"  Current as of: February 11, 2021               Content Version: 13.0  © 2006-2021 [employer]. Care instructions adapted under license by [employer] (which disclaims liability or warranty for this information). If you have questions about a medical condition or this instruction, always ask your healthcare professional. [place] [address] any warranty or liability for your use of this information.

## 2022-02-25 ENCOUNTER — TELEPHONE (OUTPATIENT)
Dept: INTERNAL MEDICINE CLINIC | Age: 32
End: 2022-02-25

## 2022-02-25 NOTE — TELEPHONE ENCOUNTER
Per nurse, patient is scheduled at 9:10am but patient is a new patient and needs to come in at 8:30am. Munira Cash called the patient and LVM informing her of appt time change. Requested patient call office back asap if she has concerns or needs to reschedule.

## 2022-03-18 PROBLEM — F11.20 OPIOID USE DISORDER, MODERATE, DEPENDENCE (HCC): Status: ACTIVE | Noted: 2021-12-10

## 2022-03-18 PROBLEM — F43.25 ADJUSTMENT DISORDER WITH MIXED DISTURBANCE OF EMOTIONS AND CONDUCT: Status: ACTIVE | Noted: 2021-12-10

## 2022-03-18 PROBLEM — F33.1 MAJOR DEPRESSIVE DISORDER, RECURRENT EPISODE, MODERATE DEGREE (HCC): Status: ACTIVE | Noted: 2017-10-05

## 2022-03-19 PROBLEM — S52.92XA RADIUS/ULNA FRACTURE, LEFT, CLOSED, INITIAL ENCOUNTER: Status: ACTIVE | Noted: 2020-10-09

## 2022-03-19 PROBLEM — S52.92XA LEFT FOREARM FRACTURE: Status: ACTIVE | Noted: 2020-10-09

## 2022-03-19 PROBLEM — F15.20 AMPHETAMINE USE DISORDER, MODERATE (HCC): Status: ACTIVE | Noted: 2021-12-10

## 2022-03-19 PROBLEM — S52.202A RADIUS/ULNA FRACTURE, LEFT, CLOSED, INITIAL ENCOUNTER: Status: ACTIVE | Noted: 2020-10-09

## 2022-03-19 PROBLEM — F32.A DEPRESSION: Status: ACTIVE | Noted: 2017-10-04

## 2022-03-20 PROBLEM — F10.10 ALCOHOL USE DISORDER, MILD, ABUSE: Status: ACTIVE | Noted: 2021-12-10

## 2022-06-20 ENCOUNTER — OFFICE VISIT (OUTPATIENT)
Dept: INTERNAL MEDICINE CLINIC | Age: 32
End: 2022-06-20
Payer: MEDICAID

## 2022-06-20 VITALS
WEIGHT: 135.5 LBS | OXYGEN SATURATION: 99 % | RESPIRATION RATE: 12 BRPM | TEMPERATURE: 98.8 F | HEIGHT: 70 IN | HEART RATE: 78 BPM | BODY MASS INDEX: 19.4 KG/M2 | DIASTOLIC BLOOD PRESSURE: 81 MMHG | SYSTOLIC BLOOD PRESSURE: 138 MMHG

## 2022-06-20 DIAGNOSIS — G89.29 CHRONIC RIGHT SHOULDER PAIN: Primary | ICD-10-CM

## 2022-06-20 DIAGNOSIS — M25.511 CHRONIC RIGHT SHOULDER PAIN: Primary | ICD-10-CM

## 2022-06-20 PROCEDURE — 99203 OFFICE O/P NEW LOW 30 MIN: CPT | Performed by: INTERNAL MEDICINE

## 2022-06-20 RX ORDER — METHYLPREDNISOLONE 4 MG/1
TABLET ORAL
Qty: 1 DOSE PACK | Refills: 0 | Status: SHIPPED | OUTPATIENT
Start: 2022-06-20 | End: 2022-06-26

## 2022-06-20 NOTE — PATIENT INSTRUCTIONS
Shoulder Blade: Exercises  Introduction  Here are some examples of exercises for you to try. The exercises may be suggested for a condition or for rehabilitation. Start each exercise slowly. Ease off the exercises if you start to have pain. You will be told when to start these exercises and which ones will work best for you. How to do the exercises  Shoulder roll    1. Stand tall with your chin slightly tucked. Imagine that a string at the top of your head is pulling you straight up. 2. Keep your arms relaxed. All motion will be in your shoulders. 3. Shrug your shoulders up toward your ears, then up and back. [place] your shoulders down and back, like you're sliding your hands down into your back pants pockets. 4. Repeat the circles at least 2 to 4 times. 5. This exercise is also helpful anytime you want to relax. Lower neck and upper back stretch    1. With your arms about shoulder height, clasp your hands in front of you. 2. Drop your chin toward your chest.  3. Reach straight forward so you are rounding your upper back. Think about pulling your shoulder blades apart. [place] Lov[place] feel a stretch across your upper back and shoulders. Hold for at least 6 seconds. 4. Repeat 2 to 4 times. Triceps stretch    1. Reach your arm straight up. 2. Keeping your elbow in place, bend your arm and reach your hand down behind your back. 3. With your other hand, apply gentle pressure to the bent elbow. [place] Lovett feel a stretch at the back of your upper arm and shoulder. Hold about 6 seconds. 4. Repeat 2 to 4 times with each arm. Shoulder stretch    1. Relax your shoulders. 2. Raise one arm to shoulder height, and reach it across your chest.  3. Pull the arm slightly toward you with your other arm. This will help you get a gentle stretch. Hold for about 6 seconds. 4. Repeat 2 to 4 times. Shoulder blade squeeze    1. Sit or stand up tall with your arms at your sides.   2. Keep your shoulders relaxed and down, not shrugged. 3. Squeeze your shoulder blades together. Hold for 6 seconds, then relax. 4. Repeat 8 to 12 times. Straight-arm shoulder blade squeeze    1. Sit or stand tall. Relax your shoulders. 2. With palms down, hold your elastic tubing or band straight out in front of you. 3. Start with slight tension in the tubing or band, with your hands about shoulder-width apart. 4. Slowly pull straight out to the sides, squeezing your shoulder blades together. Keep your arms straight and at shoulder height. Slowly release. 5. Repeat 8 to 12 times. Rowing    1. [place] your elastic tubing or band at about waist height. Take one end in each hand. 2. Sit or stand with your feet hip-width apart. 3. Hold your arms straight in front of you. Adjust your distance to create slight tension in the tubing or band. 4. Slightly tuck your chin. Relax your shoulders. 5. Without shrugging your shoulders, pull straight back. Your elbows will pass alongside your waist.  Pull-downs    1. [place] your elastic tubing or band in the top of a closed door. Take one end in each hand. 2. Either sit or stand, depending on what is more comfortable. If you feel unsteady, sit on a chair. 3. Start with your arms up and comfortably apart, elbows straight. There should be a slight tension in the tubing or band. 4. Slightly tuck your chin, and look straight ahead. 5. Keeping your back straight, slowly pull down and back, bending your elbows. 6. Stop where your hands are level with your chin, in a \"goalpost\" position. 7. Repeat 8 to 12 times. Chest T stretch    1. Lie on your back. Raise your knees so they are bent. Plant your feet on the floor, hip-width apart. 2. Tuck your chin, and relax your shoulders. 3. Reach your arms straight out to the sides. If you don't feel a mild stretch in your shoulders and across your chest, use a foam roll or a tightly rolled blanket under your spine, from your tailbone to your head.   4. Relax in this position for at least 15 to 30 seconds while you breathe normally. Repeat 2 to 4 times. 5. As you get used to this stretch, keep adding a little more time until you are able relax in this position for 2 or 3 minutes. When you can relax for at least 2 minutes, you only need to do the exercise 1 time per session. Chest goalpost stretch    1. Lie on your back. Raise your knees so they are bent. Plant your feet on the floor, hip-width apart. 2. Tuck your chin, and relax your shoulders. 3. Reach your arms straight out to the sides. 4. Bend your arms at the elbows, with your hands pointed toward the top of your head. Your arms should make an L on either side of your head. Your palms should be facing up. 5. If you don't feel a mild stretch in your shoulders and across your chest, use a foam roll or tightly rolled blanket under your spine, from your tailbone to your head. 6. Relax in this position for at least 15 to 30 seconds while you breathe normally. Repeat 2 to 4 times. 7. Each day you do this exercise, add a little more time until you can relax in this position for 2 or 3 minutes. When you can relax for at least 2 minutes, you only need to do the exercise 1 time per session. Follow-up care is a key part of your treatment and safety. Be sure to make and go to all appointments, and call your doctor if you are having problems. It's also a good idea to know your test results and keep a list of the medicines you take. Where can you learn more? Go to http://www.gray.com/  Enter H745 in the search box to learn more about \"Shoulder Blade: Exercises. \"  Current as of: July 1, 2021               Content Version: 13.2  © 4735-3329 [employer]. Care instructions adapted under license by [employer] (which disclaims liability or warranty for this information).  If you have questions about a medical condition or this instruction, always ask your healthcare professional. Kelsey Diaz, "[employer]" disclaims any warranty or liability for your use of this information. Shoulder Stretches: Exercises  Introduction  Here are some examples of exercises for you to try. The exercises may be suggested for a condition or for rehabilitation. Start each exercise slowly. Ease off the exercises if you start to have pain. You will be told when to start these exercises and which ones will work best for you. How to do the exercises  Shoulder stretch    1.  a doorway and place one arm against the door frame. Your elbow should be a little higher than your shoulder. 2. Relax your shoulders as you lean forward, allowing your chest and shoulder muscles to stretch. You can also turn your body slightly away from your arm to stretch the muscles even more. 3. Hold for 15 to 30 seconds. 4. Repeat 2 to 4 times with each arm. Shoulder and chest stretch    1. Shoulder and chest stretch  2. While sitting, relax your upper body so you slump slightly in your chair. 3. As you breathe in, straighten your back and open your arms out to the sides. 4. Gently pull your shoulder blades back and downward. 5. Hold for 15 to 30 seconds as your breathe normally. 6. Repeat 2 to 4 times. Overhead stretch    1. Reach up over your head with both arms. 2. Hold for 15 to 30 seconds. 3. Repeat 2 to 4 times. Follow-up care is a key part of your treatment and safety. Be sure to make and go to all appointments, and call your doctor if you are having problems. It's also a good idea to know your test results and keep a list of the medicines you take. Where can you learn more? Go to http://www.gray.com/  Enter S254 in the search box to learn more about \"Shoulder Stretches: Exercises. \"  Current as of: July 1, 2021               Content Version: 13.2  © 9338-5412 [employer].    Care instructions adapted under license by [employer] (which disclaims liability or warranty for this information). If you have questions about a medical condition or this instruction, always ask your healthcare professional. [place] [address] any warranty or liability for your use of this information.

## 2022-06-20 NOTE — PROGRESS NOTES
HPI  Ms. Yudith Morales is a 32y.o. year old female, she is seen today for a few months of right shoulder pain - about 2 mos. From shoulder blade to fingertips, has tried ice, heat, massage - not helping. No weakness. No neck pain. Radiates from shoulder blade to fingertips which feel tingly. Tingly sensation comes and goes. Chronic aching pain. Pain better when lying on right side, worse when lying on left side. 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 No known injury. Chief Complaint   Patient presents with    Shoulder Pain     right        Prior to Admission medications    Medication Sig Start Date End Date Taking? Authorizing Provider   methylPREDNISolone (MEDROL DOSEPACK) 4 mg tablet use as directed 6/20/22 6/26/22 Yes Maya Chavis MD   buPROPion XL (WELLBUTRIN XL) 150 mg tablet Take 1 Tablet by mouth every morning. Indications: anxiousness associated with depression  Patient not taking: Reported on 6/20/2022 12/14/21 6/20/22  Usman Gabriel MD         No Known Allergies      REVIEW OF SYSTEMS:  Per HPI    PHYSICAL EXAM:  Visit Vitals  /81 (BP 1 Location: Left upper arm, BP Patient Position: Sitting)   Pulse 78   Temp 98.8 °F (37.1 °C) (Oral)   Resp 12   Ht 5' 10\" (1.778 m)   Wt 135 lb 8 oz (61.5 kg)   LMP 05/30/2022   SpO2 99%   BMI 19.44 kg/m²     Constitutional: Appears well-developed and well-nourished. No distress. HENT:   Head: Normocephalic and atraumatic. Eyes: No scleral icterus. Neck: no lad, no tm, supple    Cardiovascular: Normal S1/S2, regular rhythm. No murmurs, rubs, or gallops. Pulmonary/Chest: Effort normal and breath sounds normal. No respiratory distress. No wheezes, rhonchi, or rales. Abdomen: Soft, NT/ND, +BS, no rebound or guarding, no masses, no HSM appreciated.    Right shoulder: +pain on palpation bicipital tendon, FROM to passive movement but pain with abduction, empty can test pos on right  Ext: No edema. Neurological: Alert. Strength 5/5 b/l UE  Psychiatric: Normal mood and affect. Behavior is normal.     Lab Results   Component Value Date/Time    Sodium 140 12/09/2021 10:50 AM    Potassium 3.5 12/09/2021 10:50 AM    Chloride 108 12/09/2021 10:50 AM    CO2 24 12/09/2021 10:50 AM    Anion gap 8 12/09/2021 10:50 AM    Glucose 95 12/09/2021 10:50 AM    Glucose 81 10/05/2017 06:01 AM    BUN 8 12/09/2021 10:50 AM    Creatinine 0.79 12/09/2021 10:50 AM    BUN/Creatinine ratio 10 (L) 12/09/2021 10:50 AM    GFR est AA >60 12/09/2021 10:50 AM    GFR est non-AA >60 12/09/2021 10:50 AM    Calcium 8.6 12/09/2021 10:50 AM    Bilirubin, total 0.3 12/09/2021 10:50 AM    Alk. phosphatase 92 12/09/2021 10:50 AM    Protein, total 7.6 12/09/2021 10:50 AM    Albumin 4.1 12/09/2021 10:50 AM    Globulin 3.5 12/09/2021 10:50 AM    A-G Ratio 1.2 12/09/2021 10:50 AM    ALT (SGPT) 25 12/09/2021 10:50 AM     No results found for: HBA1C, [id number], [id number]   Lab Results   Component Value Date/Time    Cholesterol, total 286 (H) 10/05/2017 06:01 AM    HDL Cholesterol 72 10/05/2017 06:01 AM    LDL, calculated 180.4 (H) 10/05/2017 06:01 AM    VLDL, calculated 33.6 10/05/2017 06:01 AM    Triglyceride 168 (H) 10/05/2017 06:01 AM    CHOL/HDL Ratio 4.0 10/05/2017 06:01 AM          ASSESSMENT/PLAN  Diagnoses and all orders for this visit:    1. Chronic right shoulder pain  -     REFERRAL TO ORTHOPEDICS  -     methylPREDNISolone (MEDROL DOSEPACK) 4 mg tablet; use as directed      Concern for nerve impingement vs rotator cuff issue - treat as above, refer to ortho    Health Maintenance Due   Topic Date Due    Hepatitis C Screening  Never done    Pneumococcal 0-64 years (1 - PCV) Never done    Depression Monitoring  Never done    Cervical cancer screen  Never done    COVID-19 Vaccine (3 - Booster for Pfizer series) 02/16/2022               Reviewed plan of care. Patient has provided input and agrees with goals.      The nurse provided the patient and/or family with advanced directive information if needed and encouraged the patient to provide a copy to the office when available.

## 2022-06-20 NOTE — PROGRESS NOTES
Jemma Betancur  Identified pt with two pt identifiers(name and ). Chief Complaint   Patient presents with    Shoulder Pain       Reviewed record In preparation for visit and have obtained necessary documentation. 1. Have you been to the ER, urgent care clinic or hospitalized since your last visit? No     2. Have you seen or consulted any other health care providers outside of the [address] [place] [address] since your last visit? Include any pap smears or colon screening. No    Vitals reviewed with provider. Health Maintenance reviewed:     Health Maintenance Due   Topic    Hepatitis C Screening     Pneumococcal 0-64 years (1 - PCV)    Depression Monitoring     Cervical cancer screen     COVID-19 Vaccine (3 - Booster for Pfizer series)        Wt Readings from Last 3 Encounters:   21 141 lb 5 oz (64.1 kg)   21 142 lb 6.7 oz (64.6 kg)   21 144 lb 10 oz (65.6 kg)      Temp Readings from Last 3 Encounters:   21 98.4 °F (36.9 °C)   21 98.2 °F (36.8 °C)   21 98.5 °F (36.9 °C)      BP Readings from Last 3 Encounters:   21 120/74   21 123/82   21 133/82      Pulse Readings from Last 3 Encounters:   21 78   21 88   21 67      There were no vitals filed for this visit. Learning Assessment:   :     No flowsheet data found. Depression Screening:   :     No flowsheet data found. Fall Risk Assessment:   :     No flowsheet data found. Abuse Screening:   :     No flowsheet data found. ADL Screening:   :     No flowsheet data found.

## 2024-11-07 NOTE — BH NOTES
1300-Patient met with court for hearing and was committed to hospital x 30 days Patient called in regards to texts and messages she had been receiving. She wanted to know if there was any way to let them know she was working on getting the income documents for financial assistance. I gave her the billing number of [phone].

## (undated) DEVICE — BNDG ELAS HK LOOP 4X5YD NS -- MATRIX

## (undated) DEVICE — COVER LT HNDL PLAS RIG 1 PER PK

## (undated) DEVICE — SPONGE GZ W4XL4IN COT 12 PLY TYP VII WVN C FLD DSGN

## (undated) DEVICE — PADDING CST 4IN STERILE --

## (undated) DEVICE — Z DISCONTINUED PER MEDLINE (LOW STOCK)  USE 2422770 DRAPE C ARM W54XL78IN FOR FLROSCN

## (undated) DEVICE — DRESSING,GAUZE,XEROFORM,CURAD,1"X8",ST: Brand: CURAD

## (undated) DEVICE — PENCIL ES L3M BTTN SWCH S STL HEX LOK BLDE ELECTRD HOLSTER

## (undated) DEVICE — PREP SKN CHLRAPRP APL 26ML STR --

## (undated) DEVICE — DERMABOND SKIN ADH 0.7ML -- DERMABOND ADVANCED 12/BX

## (undated) DEVICE — HAND I-LF: Brand: MEDLINE INDUSTRIES, INC.

## (undated) DEVICE — REM POLYHESIVE ADULT PATIENT RETURN ELECTRODE: Brand: VALLEYLAB

## (undated) DEVICE — 2.5MM DRILL BIT/QC/GOLD/110MM

## (undated) DEVICE — TUBING SUCT 12FR MAL ALUM SHFT FN CAP VENT UNIV CONN W/ OBT

## (undated) DEVICE — PADDING CAST 4 INX5 YD STRL

## (undated) DEVICE — INFECTION CONTROL KIT SYS

## (undated) DEVICE — SUTURE MCRYL SZ 4-0 L27IN ABSRB UD L19MM PS-2 1/2 CIR PRIM Y426H

## (undated) DEVICE — STRAP,POSITIONING,KNEE/BODY,FOAM,4X60": Brand: MEDLINE

## (undated) DEVICE — SUTURE VCRL SZ 2-0 L36IN ABSRB UD L36MM CT-1 1/2 CIR J945H

## (undated) DEVICE — PAD,ABDOMINAL,5"X9",ST,LF,25/BX: Brand: MEDLINE INDUSTRIES, INC.

## (undated) DEVICE — SYR 10ML LUER LOK 1/5ML GRAD --

## (undated) DEVICE — SUTURE VCRL 2-0 L27IN ABSRB UD PS-2 L19MM 1/2 CIR J428H

## (undated) DEVICE — STERILE POLYISOPRENE POWDER-FREE SURGICAL GLOVES WITH EMOLLIENT COATING: Brand: PROTEXIS

## (undated) DEVICE — SOLUTION IV 1000ML 0.9% SOD CHL

## (undated) DEVICE — SLING ARM 2-37INX8-17IN PCH -- MED

## (undated) DEVICE — Device